# Patient Record
Sex: MALE | Race: ASIAN | Employment: OTHER | URBAN - METROPOLITAN AREA
[De-identification: names, ages, dates, MRNs, and addresses within clinical notes are randomized per-mention and may not be internally consistent; named-entity substitution may affect disease eponyms.]

---

## 2017-03-20 ENCOUNTER — OFFICE VISIT (OUTPATIENT)
Dept: INTERNAL MEDICINE CLINIC | Facility: CLINIC | Age: 62
End: 2017-03-20

## 2017-03-20 ENCOUNTER — APPOINTMENT (OUTPATIENT)
Dept: LAB | Age: 62
End: 2017-03-20
Attending: INTERNAL MEDICINE
Payer: MEDICAID

## 2017-03-20 VITALS
TEMPERATURE: 97 F | RESPIRATION RATE: 12 BRPM | BODY MASS INDEX: 28.49 KG/M2 | WEIGHT: 188 LBS | SYSTOLIC BLOOD PRESSURE: 126 MMHG | DIASTOLIC BLOOD PRESSURE: 80 MMHG | HEART RATE: 74 BPM | HEIGHT: 68 IN

## 2017-03-20 DIAGNOSIS — R73.03 PREDIABETES: ICD-10-CM

## 2017-03-20 DIAGNOSIS — I10 ESSENTIAL HYPERTENSION WITH GOAL BLOOD PRESSURE LESS THAN 140/90: ICD-10-CM

## 2017-03-20 DIAGNOSIS — Z87.39 HISTORY OF GOUT: ICD-10-CM

## 2017-03-20 LAB
ALBUMIN SERPL BCP-MCNC: 4.1 G/DL (ref 3.5–4.8)
ALBUMIN/GLOB SERPL: 1.5 {RATIO} (ref 1–2)
ALP SERPL-CCNC: 77 U/L (ref 32–100)
ALT SERPL-CCNC: 24 U/L (ref 17–63)
ANION GAP SERPL CALC-SCNC: 10 MMOL/L (ref 0–18)
AST SERPL-CCNC: 23 U/L (ref 15–41)
BILIRUB SERPL-MCNC: 0.6 MG/DL (ref 0.3–1.2)
BUN SERPL-MCNC: 10 MG/DL (ref 8–20)
BUN/CREAT SERPL: 8.4 (ref 10–20)
CALCIUM SERPL-MCNC: 9 MG/DL (ref 8.5–10.5)
CHLORIDE SERPL-SCNC: 102 MMOL/L (ref 95–110)
CO2 SERPL-SCNC: 28 MMOL/L (ref 22–32)
CREAT SERPL-MCNC: 1.19 MG/DL (ref 0.5–1.5)
GLOBULIN PLAS-MCNC: 2.7 G/DL (ref 2.5–3.7)
GLUCOSE SERPL-MCNC: 110 MG/DL (ref 70–99)
HBA1C MFR BLD: 6 % (ref 4–6)
OSMOLALITY UR CALC.SUM OF ELEC: 290 MOSM/KG (ref 275–295)
POTASSIUM SERPL-SCNC: 3.6 MMOL/L (ref 3.3–5.1)
PROT SERPL-MCNC: 6.8 G/DL (ref 5.9–8.4)
SODIUM SERPL-SCNC: 140 MMOL/L (ref 136–144)
URATE SERPL-MCNC: 8 MG/DL (ref 3.3–8.7)

## 2017-03-20 PROCEDURE — 80053 COMPREHEN METABOLIC PANEL: CPT

## 2017-03-20 PROCEDURE — 84550 ASSAY OF BLOOD/URIC ACID: CPT

## 2017-03-20 PROCEDURE — 99214 OFFICE O/P EST MOD 30 MIN: CPT | Performed by: INTERNAL MEDICINE

## 2017-03-20 PROCEDURE — 36415 COLL VENOUS BLD VENIPUNCTURE: CPT

## 2017-03-20 PROCEDURE — 99212 OFFICE O/P EST SF 10 MIN: CPT | Performed by: INTERNAL MEDICINE

## 2017-03-20 PROCEDURE — 83036 HEMOGLOBIN GLYCOSYLATED A1C: CPT

## 2017-03-20 RX ORDER — AMLODIPINE BESYLATE 5 MG/1
5 TABLET ORAL
Refills: 11 | COMMUNITY
Start: 2017-01-24 | End: 2017-03-20

## 2017-03-20 RX ORDER — AMLODIPINE BESYLATE 5 MG/1
5 TABLET ORAL
Qty: 90 TABLET | Refills: 1 | Status: SHIPPED | OUTPATIENT
Start: 2017-03-20 | End: 2017-10-20

## 2017-03-20 NOTE — PROGRESS NOTES
HPI:    Patient ID: Aline Nuñez is a 59year old male. Blood Sugar  This is a chronic (pt states he has prediabetes) problem. The current episode started more than 1 year ago. The problem occurs constantly. The problem has been unchanged.  Pertinent ne LOSARTAN POTASSIUM 25 MG Oral Tab TAKE 1 TABLET BY MOUTH EVERY DAY Disp: 30 tablet Rfl: 2   Zoster Vaccine Live (ZOSTAVAX) 26371 UNT/0.65ML Subcutaneous Recon Soln As directed for 1 dose.  Disp: 1 each Rfl: 0     Allergies:  Atarax [Hydroxyzine]    Fatigu [E]  Lipid Panel [E]  Microalb/Creat Ratio, Random Urine [E]  Uric Acid, Serum [E]  Hemoglobin A1C [E]    Meds This Visit:  No prescriptions requested or ordered in this encounter       Imaging & Referrals:  None       OB#[id number]

## 2017-08-28 ENCOUNTER — MYAURORA ACCOUNT LINK (OUTPATIENT)
Dept: OTHER | Age: 62
End: 2017-08-28

## 2017-09-05 ENCOUNTER — LAB ENCOUNTER (OUTPATIENT)
Dept: LAB | Age: 62
End: 2017-09-05
Attending: INTERNAL MEDICINE
Payer: MEDICAID

## 2017-09-05 ENCOUNTER — OFFICE VISIT (OUTPATIENT)
Dept: INTERNAL MEDICINE CLINIC | Facility: CLINIC | Age: 62
End: 2017-09-05

## 2017-09-05 VITALS
WEIGHT: 192 LBS | DIASTOLIC BLOOD PRESSURE: 86 MMHG | BODY MASS INDEX: 28.77 KG/M2 | TEMPERATURE: 98 F | RESPIRATION RATE: 12 BRPM | HEIGHT: 68.5 IN | HEART RATE: 64 BPM | SYSTOLIC BLOOD PRESSURE: 134 MMHG

## 2017-09-05 DIAGNOSIS — Z00.00 ANNUAL PHYSICAL EXAM: ICD-10-CM

## 2017-09-05 DIAGNOSIS — R35.1 NOCTURIA: ICD-10-CM

## 2017-09-05 DIAGNOSIS — Z87.39 HISTORY OF GOUT: ICD-10-CM

## 2017-09-05 DIAGNOSIS — Z12.11 COLON CANCER SCREENING: ICD-10-CM

## 2017-09-05 DIAGNOSIS — R06.83 SNORING: ICD-10-CM

## 2017-09-05 DIAGNOSIS — B35.1 ONYCHOMYCOSIS: ICD-10-CM

## 2017-09-05 DIAGNOSIS — Z00.00 ANNUAL PHYSICAL EXAM: Primary | ICD-10-CM

## 2017-09-05 DIAGNOSIS — I10 ESSENTIAL HYPERTENSION WITH GOAL BLOOD PRESSURE LESS THAN 140/90: ICD-10-CM

## 2017-09-05 DIAGNOSIS — R73.03 PREDIABETES: ICD-10-CM

## 2017-09-05 LAB
ALBUMIN SERPL BCP-MCNC: 4.3 G/DL (ref 3.5–4.8)
ALBUMIN/GLOB SERPL: 1.7 {RATIO} (ref 1–2)
ALP SERPL-CCNC: 69 U/L (ref 32–100)
ALT SERPL-CCNC: 27 U/L (ref 17–63)
ANION GAP SERPL CALC-SCNC: 10 MMOL/L (ref 0–18)
AST SERPL-CCNC: 25 U/L (ref 15–41)
BASOPHILS # BLD: 0 K/UL (ref 0–0.2)
BASOPHILS NFR BLD: 1 %
BILIRUB SERPL-MCNC: 0.5 MG/DL (ref 0.3–1.2)
BILIRUB UR QL: NEGATIVE
BUN SERPL-MCNC: 10 MG/DL (ref 8–20)
BUN/CREAT SERPL: 9.3 (ref 10–20)
CALCIUM SERPL-MCNC: 9.1 MG/DL (ref 8.5–10.5)
CHLORIDE SERPL-SCNC: 99 MMOL/L (ref 95–110)
CHOLEST SERPL-MCNC: 96 MG/DL (ref 110–200)
CLARITY UR: CLEAR
CO2 SERPL-SCNC: 27 MMOL/L (ref 22–32)
COLOR UR: YELLOW
CREAT SERPL-MCNC: 1.08 MG/DL (ref 0.5–1.5)
EOSINOPHIL # BLD: 0.5 K/UL (ref 0–0.7)
EOSINOPHIL NFR BLD: 8 %
ERYTHROCYTE [DISTWIDTH] IN BLOOD BY AUTOMATED COUNT: 13.8 % (ref 11–15)
GLOBULIN PLAS-MCNC: 2.6 G/DL (ref 2.5–3.7)
GLUCOSE SERPL-MCNC: 107 MG/DL (ref 70–99)
GLUCOSE UR-MCNC: NEGATIVE MG/DL
HCT VFR BLD AUTO: 45.5 % (ref 41–52)
HDLC SERPL-MCNC: 18 MG/DL
HGB BLD-MCNC: 15.6 G/DL (ref 13.5–17.5)
HGB UR QL STRIP.AUTO: NEGATIVE
KETONES UR-MCNC: NEGATIVE MG/DL
LDLC SERPL CALC-MCNC: 68 MG/DL (ref 0–99)
LEUKOCYTE ESTERASE UR QL STRIP.AUTO: NEGATIVE
LYMPHOCYTES # BLD: 1.7 K/UL (ref 1–4)
LYMPHOCYTES NFR BLD: 26 %
MCH RBC QN AUTO: 29.6 PG (ref 27–32)
MCHC RBC AUTO-ENTMCNC: 34.3 G/DL (ref 32–37)
MCV RBC AUTO: 86.2 FL (ref 80–100)
MONOCYTES # BLD: 0.5 K/UL (ref 0–1)
MONOCYTES NFR BLD: 7 %
NEUTROPHILS # BLD AUTO: 3.8 K/UL (ref 1.8–7.7)
NEUTROPHILS NFR BLD: 58 %
NITRITE UR QL STRIP.AUTO: NEGATIVE
NONHDLC SERPL-MCNC: 78 MG/DL
OSMOLALITY UR CALC.SUM OF ELEC: 282 MOSM/KG (ref 275–295)
PH UR: 6 [PH] (ref 5–8)
PLATELET # BLD AUTO: 215 K/UL (ref 140–400)
PMV BLD AUTO: 9.9 FL (ref 7.4–10.3)
POTASSIUM SERPL-SCNC: 3.9 MMOL/L (ref 3.3–5.1)
PROT SERPL-MCNC: 6.9 G/DL (ref 5.9–8.4)
PROT UR-MCNC: NEGATIVE MG/DL
PSA SERPL-MCNC: 4 NG/ML (ref 0–4)
RBC # BLD AUTO: 5.28 M/UL (ref 4.5–5.9)
SODIUM SERPL-SCNC: 136 MMOL/L (ref 136–144)
SP GR UR STRIP: 1.01 (ref 1–1.03)
TRIGL SERPL-MCNC: 49 MG/DL (ref 1–149)
URATE SERPL-MCNC: 8.3 MG/DL (ref 3.3–8.7)
UROBILINOGEN UR STRIP-ACNC: <2
VIT C UR-MCNC: NEGATIVE MG/DL
WBC # BLD AUTO: 6.6 K/UL (ref 4–11)

## 2017-09-05 PROCEDURE — 81003 URINALYSIS AUTO W/O SCOPE: CPT

## 2017-09-05 PROCEDURE — 36415 COLL VENOUS BLD VENIPUNCTURE: CPT

## 2017-09-05 PROCEDURE — 80061 LIPID PANEL: CPT

## 2017-09-05 PROCEDURE — 84550 ASSAY OF BLOOD/URIC ACID: CPT

## 2017-09-05 PROCEDURE — 80053 COMPREHEN METABOLIC PANEL: CPT

## 2017-09-05 PROCEDURE — 99396 PREV VISIT EST AGE 40-64: CPT | Performed by: INTERNAL MEDICINE

## 2017-09-05 PROCEDURE — 85025 COMPLETE CBC W/AUTO DIFF WBC: CPT

## 2017-09-05 NOTE — PROGRESS NOTES
HPI:    Patient ID: Quita Tabares is a 59year old male. Patient presents today for his annual physical.        Review of Systems   Constitutional: Negative. HENT: Negative. Eyes: Negative.     Respiratory: Negative for cough and shortness of breat thyromegaly present. Cardiovascular: Normal rate, regular rhythm, normal heart sounds and intact distal pulses. No murmur heard. Pulmonary/Chest: Effort normal and breath sounds normal. No respiratory distress. He has no wheezes. He has no rales.    A Referred to ENT.    (R35.1) Nocturia  Plan: URINALYSIS, ROUTINE, UROLOGY - INTERNAL        Has 2 to 3/night nocturia. His prostate was also enlarged. We will refer to urologist.     (B35.1) Onychomycosis  Plan: DERM - INTERNAL        Refer to derm.

## 2017-09-08 ENCOUNTER — OFFICE VISIT (OUTPATIENT)
Dept: OTOLARYNGOLOGY | Facility: CLINIC | Age: 62
End: 2017-09-08

## 2017-09-08 VITALS
WEIGHT: 192 LBS | DIASTOLIC BLOOD PRESSURE: 77 MMHG | HEIGHT: 68 IN | TEMPERATURE: 98 F | HEART RATE: 73 BPM | SYSTOLIC BLOOD PRESSURE: 130 MMHG | BODY MASS INDEX: 29.1 KG/M2

## 2017-09-08 DIAGNOSIS — G47.33 OBSTRUCTIVE SLEEP APNEA SYNDROME: Primary | ICD-10-CM

## 2017-09-08 PROCEDURE — 99212 OFFICE O/P EST SF 10 MIN: CPT | Performed by: OTOLARYNGOLOGY

## 2017-09-08 PROCEDURE — 99243 OFF/OP CNSLTJ NEW/EST LOW 30: CPT | Performed by: OTOLARYNGOLOGY

## 2017-09-08 NOTE — PROGRESS NOTES
Esvin Ortiz is a 59year old male. Patient presents with:  Snoring: patient presents for snoring consult    HPI:   He has been snoring loudly for many years.  He feels that he generally is well rested in thrning but his family tells him that he seems to s Normal Lips - Normal, Tonsils - Normal, Tongue - Normal   Narrow pharynx with very small tonsils   Nasal Normal External nose - Normal. Nasal septum - donell septal deviation bilaterally, Turbinates - Normal   Neurological Normal Memory - Normal. Cranial ner

## 2017-09-08 NOTE — PATIENT INSTRUCTIONS
Snoring and Sleep Apnea: Notes for a Partner  Snoring and sleep apnea affect your life, as well as your partner’s. You can help in the treatment of the problem. Be supportive.  Encourage your partner both to get treatment and to make the adjustments neede

## 2017-09-19 ENCOUNTER — OFFICE VISIT (OUTPATIENT)
Dept: SURGERY | Facility: CLINIC | Age: 62
End: 2017-09-19

## 2017-09-19 VITALS
HEART RATE: 77 BPM | TEMPERATURE: 98 F | HEIGHT: 68 IN | SYSTOLIC BLOOD PRESSURE: 153 MMHG | BODY MASS INDEX: 28.79 KG/M2 | DIASTOLIC BLOOD PRESSURE: 79 MMHG | RESPIRATION RATE: 16 BRPM | WEIGHT: 190 LBS

## 2017-09-19 DIAGNOSIS — R97.20 ELEVATED PSA: Primary | ICD-10-CM

## 2017-09-19 PROCEDURE — 99244 OFF/OP CNSLTJ NEW/EST MOD 40: CPT | Performed by: UROLOGY

## 2017-09-19 PROCEDURE — 99212 OFFICE O/P EST SF 10 MIN: CPT | Performed by: UROLOGY

## 2017-09-19 NOTE — PROGRESS NOTES
SUBJECTIVE:  Sweta Orellana is a 59year old male who presents for a consultation at the request of, and a copy of this note will be sent to, Dr. Kate Laguerre, for evaluation of  benign prostatic hyperplasia and elevated PSA.  He states that the problem is unc sweats, bone pain, malaise and fatigue. Positive for:  None.   All other ROS reviewed and otherwise normal.    OBJECTIVE:  /79 (BP Location: Right arm, Patient Position: Sitting, Cuff Size: adult)   Pulse 77   Temp 98.2 °F (36.8 °C) (Oral)   Resp 16

## 2017-10-17 ENCOUNTER — TELEPHONE (OUTPATIENT)
Dept: GASTROENTEROLOGY | Facility: CLINIC | Age: 62
End: 2017-10-17

## 2017-10-17 ENCOUNTER — OFFICE VISIT (OUTPATIENT)
Dept: GASTROENTEROLOGY | Facility: CLINIC | Age: 62
End: 2017-10-17

## 2017-10-17 VITALS
DIASTOLIC BLOOD PRESSURE: 80 MMHG | HEIGHT: 68 IN | WEIGHT: 191.63 LBS | HEART RATE: 66 BPM | BODY MASS INDEX: 29.04 KG/M2 | SYSTOLIC BLOOD PRESSURE: 138 MMHG

## 2017-10-17 DIAGNOSIS — Z12.11 COLON CANCER SCREENING: Primary | ICD-10-CM

## 2017-10-17 DIAGNOSIS — Z12.11 ENCOUNTER FOR SCREENING COLONOSCOPY: Primary | ICD-10-CM

## 2017-10-17 PROCEDURE — 99241 OFFICE CONSULTATION,LEVEL I: CPT | Performed by: INTERNAL MEDICINE

## 2017-10-17 PROCEDURE — 99212 OFFICE O/P EST SF 10 MIN: CPT | Performed by: INTERNAL MEDICINE

## 2017-10-17 NOTE — TELEPHONE ENCOUNTER
Current Outpatient Prescriptions:  PEG 3350-KCl-NaBcb-NaCl-NaSulf (COLYTE WITH FLAVOR PACKS) 227.1 g Oral Recon Soln Mix up and chill, then drink as directed.  Disp: 1 Bottle Rfl: 0     Per pharmacy med not available pls advise and change

## 2017-10-17 NOTE — PATIENT INSTRUCTIONS
Schedule colonoscopy exam at [facility] (Kaleb Leslie)    IV sedation (conscious sedation)    Golytely (PEG) 4L bowel prep      DX = Screening CLN

## 2017-10-17 NOTE — TELEPHONE ENCOUNTER
11/25/2017 8:45 AM KIM, PROCEDURE ECWMOGIPROC None       Spoke to the pharmacist at [facility].  The prep was changed to 336g from 227g which is not available

## 2017-10-17 NOTE — PROGRESS NOTES
HPI:    Patient ID: Venessa Fernandez is a 59year old man with history of hypertension referred to us by Dr. Apollo Riddle for his second colonoscopy examination. Body mass index is 29.13 kg/m².     On review of systems, he denies abdominal pain, rectal bleedi injury or perforation, infection. The patient understood these risks and agrees to proceed. The need for an accompanying adult to provide a ride home or escort home was also discussed.     PEG 4 L bowel prep    No orders of the defined types were placed in

## 2017-10-17 NOTE — TELEPHONE ENCOUNTER
Scheduled for:  Colonoscopy 04846  Provider Name: Dr. Lorrie Bazan  Date:  11/25/17  Location:  [facility]  Sedation:  IV  Time:  8539 (pt is aware to arrive at 0745)   Prep:  Golytely  Meds/Allergies Reconciled?:  Physician reviewed   Diagnosis with codes:  Colon canc

## 2017-10-20 ENCOUNTER — TELEPHONE (OUTPATIENT)
Dept: OTOLARYNGOLOGY | Facility: CLINIC | Age: 62
End: 2017-10-20

## 2017-10-20 DIAGNOSIS — G47.30 SLEEP APNEA, UNSPECIFIED TYPE: Primary | ICD-10-CM

## 2017-10-20 RX ORDER — AMLODIPINE BESYLATE 5 MG/1
TABLET ORAL
Qty: 90 TABLET | Refills: 0 | Status: SHIPPED | OUTPATIENT
Start: 2017-10-20 | End: 2017-12-06

## 2017-10-20 NOTE — TELEPHONE ENCOUNTER
Spoke with [facility] [phone] and prior auth pending , per insurance pt qualify for home sleep study. Clinical notes faxed to [phone]. confirmation  Received.  Case [id number]

## 2017-10-20 NOTE — TELEPHONE ENCOUNTER
Sleep center would like to know if prior auth has been obtained for sleep study. Please advise.  Thank you -transferred to RN

## 2017-10-20 NOTE — TELEPHONE ENCOUNTER
Medication refilled for 90 days per protocol.     Hypertensive Medications  Protocol Criteria:  · Appointment scheduled in the past 6 months or in the next 3 months  · BMP or CMP in the past 12 months  · Creatinine result < 2  Recent Outpatient Visits

## 2017-10-21 NOTE — TELEPHONE ENCOUNTER
[phone] [facility] sleep study was denied/     Please advise/

## 2017-10-21 NOTE — TELEPHONE ENCOUNTER
, please see note below, sleep study denied . Pt would qualify for home sleep study would need prior authorization.

## 2017-10-23 ENCOUNTER — OFFICE VISIT (OUTPATIENT)
Dept: DERMATOLOGY CLINIC | Facility: CLINIC | Age: 62
End: 2017-10-23

## 2017-10-23 DIAGNOSIS — L56.8 PHOTODERMATITIS: ICD-10-CM

## 2017-10-23 DIAGNOSIS — L28.0 LICHEN SIMPLEX CHRONICUS: ICD-10-CM

## 2017-10-23 DIAGNOSIS — L30.9 DERMATITIS: Primary | ICD-10-CM

## 2017-10-23 PROCEDURE — 99212 OFFICE O/P EST SF 10 MIN: CPT | Performed by: DERMATOLOGY

## 2017-10-23 PROCEDURE — 99213 OFFICE O/P EST LOW 20 MIN: CPT | Performed by: DERMATOLOGY

## 2017-10-23 RX ORDER — KETOCONAZOLE 20 MG/G
CREAM TOPICAL
Qty: 60 G | Refills: 3 | Status: ON HOLD | OUTPATIENT
Start: 2017-10-23 | End: 2019-06-15

## 2017-10-23 RX ORDER — TRIAMCINOLONE ACETONIDE 5 MG/G
CREAM TOPICAL
Qty: 60 G | Refills: 1 | Status: ON HOLD | OUTPATIENT
Start: 2017-10-23 | End: 2019-06-15

## 2017-10-23 RX ORDER — CLOBETASOL PROPIONATE 0.5 MG/G
1 CREAM TOPICAL 2 TIMES DAILY
Qty: 60 G | Refills: 3 | Status: SHIPPED | OUTPATIENT
Start: 2017-10-23 | End: 2018-10-23

## 2017-10-23 RX ORDER — POLYETHYLENE GLYCOL 3350, SODIUM CHLORIDE, POTASSIUM CHLORIDE, SODIUM BICARBONATE, AND SODIUM SULFATE 240; 5.84; 2.98; 6.72; 22.72 G/4L; G/4L; G/4L; G/4L; G/4L
POWDER, FOR SOLUTION ORAL
Refills: 0 | Status: ON HOLD | COMMUNITY
Start: 2017-10-18 | End: 2019-06-15

## 2017-10-25 NOTE — TELEPHONE ENCOUNTER
Order generated Home Sleep Study and prior authorization approved [id number] and expires 01/23/17. Pt informed and will contact [phone] Zachariah Funez to schedule.

## 2017-10-30 NOTE — PROGRESS NOTES
Marvin Hayes is a 59year old male. Patient presents with:  Full Skin Exam: \"Established pt\"- LOV- 6-22-16. Pt presents today for full body eval. Per pt no new lesions of concern but concerned with itching to eyebrow x 2 years.  Pt with hx of photod Current Outpatient Prescriptions:  Clobetasol Propionate 0.05 % External Cream Apply 1 Application topically 2 (two) times daily.  Disp: 60 g Rfl: 3   triamcinolone acetonide 0.5 % External Cream Use bid as directed Disp: 60 g Rfl: 1 History Narrative    The patient does not use an assistive device. .      The patient does live in a home with stairs.      Family History   Problem Relation Age of Onset   • Diabetes Mother    • Hypertension Mother    • Diabetes Father    • RA [OTHER] Broth upper lateral arm specifically area. Erythema and scaling on the eyebrows r scalp with diffuse erythema and scaling. .  Prominent eczematous patchy areas inflammatory changes over the arms and hands as well overall improved versus previous exam lichenified Refills for this Visit:   Signed Prescriptions Disp Refills    Clobetasol Propionate 0.05 % External Cream 60 g 3      Sig: Apply 1 Application topically 2 (two) times daily.       triamcinolone acetonide 0.5 % External Cream 60 g 1      Sig: Use bid as dir

## 2017-11-06 ENCOUNTER — TELEPHONE (OUTPATIENT)
Dept: INTERNAL MEDICINE CLINIC | Facility: CLINIC | Age: 62
End: 2017-11-06

## 2017-11-06 NOTE — TELEPHONE ENCOUNTER
Pt calling requesting refill, pt states he is out of meds.        Current Outpatient Prescriptions:  AMLODIPINE BESYLATE 5 MG Oral Tab TAKE ONE TABLET BY MOUTH ONCE DAILY Disp: 90 tablet Rfl: 0`

## 2017-11-06 NOTE — TELEPHONE ENCOUNTER
Spoke with [facility] and verified refill for Amlodipine 10/20/17 and will be ready for . Per [facility], patient's insurance only pays for 30 days at a time.  Spoke with patient and relayed refill for #30  + 2 is available at [facility]-

## 2017-11-25 ENCOUNTER — SURGERY (OUTPATIENT)
Age: 62
End: 2017-11-25

## 2017-11-25 ENCOUNTER — HOSPITAL ENCOUNTER (OUTPATIENT)
Facility: HOSPITAL | Age: 62
Setting detail: HOSPITAL OUTPATIENT SURGERY
Discharge: HOME | End: 2017-11-25
Attending: INTERNAL MEDICINE | Admitting: INTERNAL MEDICINE
Payer: MEDICAID

## 2017-11-25 DIAGNOSIS — Z12.11 COLON CANCER SCREENING: ICD-10-CM

## 2017-11-25 PROCEDURE — 45385 COLONOSCOPY W/LESION REMOVAL: CPT | Performed by: INTERNAL MEDICINE

## 2017-11-25 PROCEDURE — 0DBP8ZX EXCISION OF RECTUM, VIA NATURAL OR ARTIFICIAL OPENING ENDOSCOPIC, DIAGNOSTIC: ICD-10-PCS | Performed by: INTERNAL MEDICINE

## 2017-11-25 PROCEDURE — 99153 MOD SED SAME PHYS/QHP EA: CPT | Performed by: INTERNAL MEDICINE

## 2017-11-25 PROCEDURE — 99152 MOD SED SAME PHYS/QHP 5/>YRS: CPT | Performed by: INTERNAL MEDICINE

## 2017-11-25 RX ORDER — MIDAZOLAM HYDROCHLORIDE 1 MG/ML
1 INJECTION INTRAMUSCULAR; INTRAVENOUS EVERY 5 MIN PRN
Status: DISCONTINUED | OUTPATIENT
Start: 2017-11-25 | End: 2017-11-25

## 2017-11-25 RX ORDER — MIDAZOLAM HYDROCHLORIDE 1 MG/ML
INJECTION INTRAMUSCULAR; INTRAVENOUS
Status: DISCONTINUED | OUTPATIENT
Start: 2017-11-25 | End: 2017-11-25

## 2017-11-25 RX ORDER — SODIUM CHLORIDE 0.9 % (FLUSH) 0.9 %
10 SYRINGE (ML) INJECTION AS NEEDED
Status: DISCONTINUED | OUTPATIENT
Start: 2017-11-25 | End: 2017-11-25

## 2017-11-25 RX ORDER — SODIUM CHLORIDE, SODIUM LACTATE, POTASSIUM CHLORIDE, CALCIUM CHLORIDE 600; 310; 30; 20 MG/100ML; MG/100ML; MG/100ML; MG/100ML
INJECTION, SOLUTION INTRAVENOUS CONTINUOUS
Status: DISCONTINUED | OUTPATIENT
Start: 2017-11-25 | End: 2017-11-25

## 2017-11-25 NOTE — PRE-SEDATION ASSESSMENT
Physician Pre-Sedation Assessment    Pre-Sedation Assessment:    Sedation History: Airway Assessed    Cardiac: normal S1, S2  Respiratory: breath sounds clear bilaterally   Abdomen: soft, BS (+), non-tender    ASA Classification: ASA II    Plan: IV Sedatio

## 2017-11-25 NOTE — H&P
History & Physical Examination    Patient Name: Antoni Khoury  MRN: [id number]  CSN: [id number]  YOB: 1955    Diagnosis: Screening colonoscopy examination    Present Illness:     59year old man with history of hypertension referred to us by mg Intravenous Q5 Min PRN   fentaNYL citrate (SUBLIMAZE) 0.05 MG/ML injection 25 mcg 25 mcg Intravenous Q5 Min PRN       Allergies:   Atarax [Hydroxyzine]    Fatigue    Past Medical History:   Diagnosis Date   • Essential hypertension 5/27/2015   • Gout

## 2017-11-25 NOTE — OPERATIVE REPORT
COLONOSCOPY PROCEDURE REPORT     DATE OF PROCEDURE:  11/25/2017     PCP: Georgette Guerra MD     PREOPERATIVE DIAGNOSIS: Screening colonoscopy examination     POSTOPERATIVE DIAGNOSIS:  See impression. SURGEON:  Romeo Cardona M.D.     Shiela Kidd

## 2017-11-27 VITALS
OXYGEN SATURATION: 95 % | RESPIRATION RATE: 18 BRPM | HEART RATE: 60 BPM | HEIGHT: 68 IN | DIASTOLIC BLOOD PRESSURE: 80 MMHG | SYSTOLIC BLOOD PRESSURE: 134 MMHG | WEIGHT: 185 LBS | BODY MASS INDEX: 28.04 KG/M2

## 2017-12-04 ENCOUNTER — TELEPHONE (OUTPATIENT)
Dept: GASTROENTEROLOGY | Facility: CLINIC | Age: 62
End: 2017-12-04

## 2017-12-04 NOTE — TELEPHONE ENCOUNTER
Message   Received: 2 days ago   [address] [facility], Roseann Gary MD  [facility] Clinical Staff             GI RNs - 1.  Please print and mail this letter to patient; 2. Recall for colonoscopy exam in 5 years      Results letter mailed to patient a

## 2017-12-06 ENCOUNTER — PRIOR ORIGINAL RECORDS (OUTPATIENT)
Dept: OTHER | Age: 62
End: 2017-12-06

## 2017-12-06 ENCOUNTER — APPOINTMENT (OUTPATIENT)
Dept: LAB | Age: 62
End: 2017-12-06
Attending: INTERNAL MEDICINE
Payer: MEDICAID

## 2017-12-06 ENCOUNTER — OFFICE VISIT (OUTPATIENT)
Dept: INTERNAL MEDICINE CLINIC | Facility: CLINIC | Age: 62
End: 2017-12-06

## 2017-12-06 VITALS
TEMPERATURE: 97 F | DIASTOLIC BLOOD PRESSURE: 78 MMHG | HEART RATE: 93 BPM | BODY MASS INDEX: 29.98 KG/M2 | SYSTOLIC BLOOD PRESSURE: 133 MMHG | WEIGHT: 191 LBS | RESPIRATION RATE: 12 BRPM | HEIGHT: 67 IN

## 2017-12-06 DIAGNOSIS — R00.2 PALPITATION: ICD-10-CM

## 2017-12-06 DIAGNOSIS — Z23 NEED FOR VACCINATION: ICD-10-CM

## 2017-12-06 DIAGNOSIS — H93.12 TINNITUS, LEFT: Primary | ICD-10-CM

## 2017-12-06 PROCEDURE — 84443 ASSAY THYROID STIM HORMONE: CPT

## 2017-12-06 PROCEDURE — 90471 IMMUNIZATION ADMIN: CPT | Performed by: INTERNAL MEDICINE

## 2017-12-06 PROCEDURE — 90686 IIV4 VACC NO PRSV 0.5 ML IM: CPT | Performed by: INTERNAL MEDICINE

## 2017-12-06 PROCEDURE — 36415 COLL VENOUS BLD VENIPUNCTURE: CPT

## 2017-12-06 PROCEDURE — 93000 ELECTROCARDIOGRAM COMPLETE: CPT | Performed by: INTERNAL MEDICINE

## 2017-12-06 PROCEDURE — 80048 BASIC METABOLIC PNL TOTAL CA: CPT

## 2017-12-06 PROCEDURE — 99214 OFFICE O/P EST MOD 30 MIN: CPT | Performed by: INTERNAL MEDICINE

## 2017-12-06 PROCEDURE — 83735 ASSAY OF MAGNESIUM: CPT

## 2017-12-06 RX ORDER — AMLODIPINE BESYLATE 5 MG/1
TABLET ORAL
Qty: 90 TABLET | Refills: 0 | Status: SHIPPED | OUTPATIENT
Start: 2017-12-06 | End: 2018-03-07

## 2017-12-06 NOTE — PROGRESS NOTES
HPI:    Patient ID: Kacey Boyd is a 59year old male. Tinnitus   This is a chronic problem. The current episode started more than 1 year ago (5 yrs). The problem occurs constantly. The problem has been unchanged.  Pertinent negatives include no chest Live (ZOSTAVAX) 94509 UNT/0.65ML Subcutaneous Recon Soln As directed for 1 dose. Disp: 1 each Rfl: 0     Allergies:  Atarax [Hydroxyzine]    Fatigue   PHYSICAL EXAM:   Physical Exam   Constitutional: He appears well-developed. Non-toxic appearance.  He cruz ffup with Dr Micky Longo his cardiologist. We will do bmp, Mg and TSH. We will also do 2decho and holter test. Pt told to go to ER if pt gets more episodes or more symptoms. Pt agreed.     (Z23) Need for vaccination  Plan: FLULAVAL INFLUENZA VACCINE QUAD PRESERVA

## 2017-12-08 ENCOUNTER — OFFICE VISIT (OUTPATIENT)
Dept: AUDIOLOGY | Facility: CLINIC | Age: 62
End: 2017-12-08

## 2017-12-08 ENCOUNTER — OFFICE VISIT (OUTPATIENT)
Dept: OTOLARYNGOLOGY | Facility: CLINIC | Age: 62
End: 2017-12-08

## 2017-12-08 VITALS
SYSTOLIC BLOOD PRESSURE: 138 MMHG | DIASTOLIC BLOOD PRESSURE: 70 MMHG | BODY MASS INDEX: 29.98 KG/M2 | TEMPERATURE: 98 F | WEIGHT: 191 LBS | HEIGHT: 67 IN

## 2017-12-08 DIAGNOSIS — H93.13 RINGING OF EARS, BILATERAL: Primary | ICD-10-CM

## 2017-12-08 DIAGNOSIS — IMO0001 ASYMMETRICAL HEARING LOSS OF LEFT EAR: Primary | ICD-10-CM

## 2017-12-08 DIAGNOSIS — H93.12 TINNITUS OF LEFT EAR: ICD-10-CM

## 2017-12-08 PROCEDURE — 92557 COMPREHENSIVE HEARING TEST: CPT | Performed by: AUDIOLOGIST

## 2017-12-08 PROCEDURE — 99212 OFFICE O/P EST SF 10 MIN: CPT | Performed by: OTOLARYNGOLOGY

## 2017-12-08 PROCEDURE — 99213 OFFICE O/P EST LOW 20 MIN: CPT | Performed by: OTOLARYNGOLOGY

## 2017-12-08 PROCEDURE — 92550 TYMPANOMETRY & REFLEX THRESH: CPT | Performed by: AUDIOLOGIST

## 2017-12-08 NOTE — PROGRESS NOTES
Epifanio Gustafson is a 59year old male. Patient presents with:  Ringing In Ear: patient presents for ringing of left ear for years    HPI:   Several years ago he lost hearing in his left ear.  He was seen by another physician and had an MRI performed which was otherwise  GENERAL : denies fever, chills, sweats, weight loss, weight gain  SKIN: denies any unusual skin lesions or rashes  RESPIRATORY: denies shortness of breath with exertion  NEURO: denies headaches    EXAM:   /70 (BP Location: Left arm, Patien and agrees to the plan. Jose Alfredo Wright MD  12/8/2017  11:00 AM

## 2017-12-08 NOTE — PROGRESS NOTES
AUDIOGRAM     Wolf Viveros was referred for testing by Sharona Quevedo V due to left-sided tinnitus and hearing loss. 12/24/1955  [id number]    Otoscopic Inspection:  Right ear:  No cerumen  Left ear:  No cerumen    Audiometric Test Results:   Audiometric

## 2017-12-14 LAB
BUN: 12 MG/DL
CALCIUM: 9.4 MG/DL
CHLORIDE: 103 MEQ/L
CREATININE, SERUM: 1.14 MG/DL
GLUCOSE: 130 MG/DL
GLUCOSE: 130 MG/DL
MAGNESIUM: 2.1 MG/DL
POTASSIUM, SERUM: 3.7 MEQ/L
SODIUM: 139 MEQ/L
THYROID STIMULATING HORMONE: 1.37 MLU/L

## 2017-12-15 ENCOUNTER — PRIOR ORIGINAL RECORDS (OUTPATIENT)
Dept: OTHER | Age: 62
End: 2017-12-15

## 2017-12-16 ENCOUNTER — APPOINTMENT (OUTPATIENT)
Dept: LAB | Age: 62
End: 2017-12-16
Attending: UROLOGY
Payer: MEDICAID

## 2017-12-16 DIAGNOSIS — R97.20 ELEVATED PSA: ICD-10-CM

## 2017-12-16 PROCEDURE — 36415 COLL VENOUS BLD VENIPUNCTURE: CPT

## 2017-12-16 PROCEDURE — 84153 ASSAY OF PSA TOTAL: CPT

## 2017-12-19 ENCOUNTER — OFFICE VISIT (OUTPATIENT)
Dept: SURGERY | Facility: CLINIC | Age: 62
End: 2017-12-19

## 2017-12-19 VITALS
TEMPERATURE: 98 F | BODY MASS INDEX: 28.79 KG/M2 | DIASTOLIC BLOOD PRESSURE: 88 MMHG | SYSTOLIC BLOOD PRESSURE: 136 MMHG | HEART RATE: 66 BPM | WEIGHT: 190 LBS | HEIGHT: 68 IN

## 2017-12-19 DIAGNOSIS — R97.20 ELEVATED PSA: Primary | ICD-10-CM

## 2017-12-19 PROCEDURE — 99213 OFFICE O/P EST LOW 20 MIN: CPT | Performed by: UROLOGY

## 2017-12-19 PROCEDURE — 99212 OFFICE O/P EST SF 10 MIN: CPT | Performed by: UROLOGY

## 2017-12-19 NOTE — PROGRESS NOTES
Jefry Banda is a 59year old male. HPI:   Patient presents with:  BPH: Patient still c/o nocturia 2x per night and frequency has improved to every 3-4 hours to urinate. Denies constipation, dysuria and gross hematuria.      66-year-old male presents in Application topically 2 (two) times daily. Disp: 60 g Rfl: 3   triamcinolone acetonide 0.5 % External Cream Use bid as directed Disp: 60 g Rfl: 1   ketoconazole 2 % External Cream Apply to affected area 2 times daily.  Disp: 60 g Rfl: 3   PEG 3350-KCl-NaBcb

## 2018-03-06 NOTE — TELEPHONE ENCOUNTER
Refill Protocol Appointment Criteria  · Appointment scheduled in the past 12 months or in the next 3 months  Recent Outpatient Visits            2 months ago Elevated PSA    [facility] for [facility] [place]    Offic

## 2018-03-06 NOTE — TELEPHONE ENCOUNTER
Current Outpatient Prescriptions:   •  Glucose Blood (TRUE METRIX BLOOD GLUCOSE TEST) In Vitro Strip, Check blood sugar once daily, Disp: 100 strip, Rfl: 3

## 2018-03-07 RX ORDER — AMLODIPINE BESYLATE 5 MG/1
TABLET ORAL
Qty: 90 TABLET | Refills: 0 | Status: SHIPPED | OUTPATIENT
Start: 2018-03-07 | End: 2018-06-11

## 2018-03-07 NOTE — TELEPHONE ENCOUNTER
Medication refilled for 90 days per protocol.     AmLODIPine Besylate 5 MG Oral Tab  TAKE ONE TABLET BY MOUTH ONCE DAILY       Disp: 90 tablet Refills: 0    Class: Normal Start: 3/7/2018   Documented:11 months ago  Hypertensive Medications Protocol Passed3/

## 2018-05-30 ENCOUNTER — MYAURORA ACCOUNT LINK (OUTPATIENT)
Dept: OTHER | Age: 63
End: 2018-05-30

## 2018-06-11 RX ORDER — AMLODIPINE BESYLATE 5 MG/1
TABLET ORAL
Qty: 30 TABLET | Refills: 0 | Status: SHIPPED | OUTPATIENT
Start: 2018-06-11 | End: 2018-10-24

## 2018-08-27 ENCOUNTER — PRIOR ORIGINAL RECORDS (OUTPATIENT)
Dept: OTHER | Age: 63
End: 2018-08-27

## 2018-10-24 ENCOUNTER — OFFICE VISIT (OUTPATIENT)
Dept: INTERNAL MEDICINE CLINIC | Facility: CLINIC | Age: 63
End: 2018-10-24
Payer: MEDICAID

## 2018-10-24 ENCOUNTER — PRIOR ORIGINAL RECORDS (OUTPATIENT)
Dept: OTHER | Age: 63
End: 2018-10-24

## 2018-10-24 ENCOUNTER — LAB ENCOUNTER (OUTPATIENT)
Dept: LAB | Age: 63
End: 2018-10-24
Attending: INTERNAL MEDICINE
Payer: COMMERCIAL

## 2018-10-24 VITALS
WEIGHT: 193 LBS | RESPIRATION RATE: 12 BRPM | HEART RATE: 67 BPM | DIASTOLIC BLOOD PRESSURE: 84 MMHG | BODY MASS INDEX: 29.25 KG/M2 | HEIGHT: 68 IN | TEMPERATURE: 98 F | SYSTOLIC BLOOD PRESSURE: 138 MMHG

## 2018-10-24 DIAGNOSIS — I10 ESSENTIAL HYPERTENSION WITH GOAL BLOOD PRESSURE LESS THAN 140/90: ICD-10-CM

## 2018-10-24 DIAGNOSIS — Z95.0 HISTORY OF PERMANENT CARDIAC PACEMAKER PLACEMENT: ICD-10-CM

## 2018-10-24 DIAGNOSIS — Z00.00 ANNUAL PHYSICAL EXAM: Primary | ICD-10-CM

## 2018-10-24 DIAGNOSIS — Z87.39 HISTORY OF GOUT: ICD-10-CM

## 2018-10-24 DIAGNOSIS — R73.03 PREDIABETES: ICD-10-CM

## 2018-10-24 DIAGNOSIS — Z00.00 ANNUAL PHYSICAL EXAM: ICD-10-CM

## 2018-10-24 DIAGNOSIS — H25.9 AGE-RELATED CATARACT OF BOTH EYES, UNSPECIFIED AGE-RELATED CATARACT TYPE: ICD-10-CM

## 2018-10-24 PROCEDURE — 90471 IMMUNIZATION ADMIN: CPT | Performed by: INTERNAL MEDICINE

## 2018-10-24 PROCEDURE — 36415 COLL VENOUS BLD VENIPUNCTURE: CPT

## 2018-10-24 PROCEDURE — 80053 COMPREHEN METABOLIC PANEL: CPT

## 2018-10-24 PROCEDURE — 83036 HEMOGLOBIN GLYCOSYLATED A1C: CPT

## 2018-10-24 PROCEDURE — 99396 PREV VISIT EST AGE 40-64: CPT | Performed by: INTERNAL MEDICINE

## 2018-10-24 PROCEDURE — 80061 LIPID PANEL: CPT

## 2018-10-24 PROCEDURE — 84550 ASSAY OF BLOOD/URIC ACID: CPT

## 2018-10-24 PROCEDURE — 85025 COMPLETE CBC W/AUTO DIFF WBC: CPT

## 2018-10-24 PROCEDURE — 90686 IIV4 VACC NO PRSV 0.5 ML IM: CPT | Performed by: INTERNAL MEDICINE

## 2018-10-24 PROCEDURE — 81003 URINALYSIS AUTO W/O SCOPE: CPT

## 2018-10-24 RX ORDER — AMLODIPINE BESYLATE 5 MG/1
TABLET ORAL
Qty: 90 TABLET | Refills: 1 | Status: SHIPPED | OUTPATIENT
Start: 2018-10-24 | End: 2019-06-10

## 2018-10-24 NOTE — PROGRESS NOTES
HPI:    Patient ID: Tunde Chacko is a 57year old male. Patient presents today for annual physical.       Hypertension   This is a chronic problem. The current episode started more than 1 year ago. The problem is controlled.  Pertinent negatives include Zoster Vaccine Live (ZOSTAVAX) 72541 UNT/0.65ML Subcutaneous Recon Soln As directed for 1 dose.  Disp: 1 each Rfl: 0     Allergies:  Atarax [Hydroxyzine]    FATIGUE   PHYSICAL EXAM:   Physical Exam   Constitutional: He appears well-developed and well-nour PANEL, PSA SCREEN,         URINALYSIS, ROUTINE        Routine labs ordered. Pt given flu shot today. Pt already had his colonoscopy last year.  Check psa.     (I10) Essential hypertension with goal blood pressure less than 140/90  Plan: bp fair but has not

## 2018-10-27 ENCOUNTER — TELEPHONE (OUTPATIENT)
Dept: INTERNAL MEDICINE CLINIC | Facility: CLINIC | Age: 63
End: 2018-10-27

## 2018-10-27 DIAGNOSIS — R97.20 PSA ELEVATION: Primary | ICD-10-CM

## 2018-12-10 ENCOUNTER — MYAURORA ACCOUNT LINK (OUTPATIENT)
Dept: OTHER | Age: 63
End: 2018-12-10

## 2018-12-20 LAB
ALT (SGPT): 23 U/L
AST (SGOT): 24 U/L
BILIRUBIN TOTAL: 0.7 MG/DL
BUN: 9 MG/DL
CALCIUM: 8.9 MG/DL
CHLORIDE: 103 MEQ/L
CHOLESTEROL, TOTAL: 99 MG/DL
CREATININE, SERUM: 0.97 MG/DL
GLUCOSE: 108 MG/DL
GLUCOSE: 108 MG/DL
HDL CHOLESTEROL: 18 MG/DL
HEMATOCRIT: 44.9 %
HEMOGLOBIN: 15.3 G/DL
LDL CHOLESTEROL: 71 MG/DL
NON-HDL CHOLESTEROL: 81 MG/DL
PLATELETS: 217 K/UL
POTASSIUM, SERUM: 3.8 MEQ/L
PROTEIN, TOTAL: 6.2 G/DL
RED BLOOD COUNT: 5.25 X 10-6/U
SGOT (AST): 24 IU/L
SGPT (ALT): 23 IU/L
SODIUM: 137 MEQ/L
TRIGLYCERIDES: 51 MG/DL
WHITE BLOOD COUNT: 6.5 X 10-3/U

## 2018-12-21 ENCOUNTER — PRIOR ORIGINAL RECORDS (OUTPATIENT)
Dept: OTHER | Age: 63
End: 2018-12-21

## 2018-12-21 ENCOUNTER — MYAURORA ACCOUNT LINK (OUTPATIENT)
Dept: OTHER | Age: 63
End: 2018-12-21

## 2019-01-01 ENCOUNTER — EXTERNAL RECORD (OUTPATIENT)
Dept: HEALTH INFORMATION MANAGEMENT | Facility: OTHER | Age: 64
End: 2019-01-01

## 2019-02-01 ENCOUNTER — APPOINTMENT (OUTPATIENT)
Dept: LAB | Facility: HOSPITAL | Age: 64
End: 2019-02-01
Attending: NURSE PRACTITIONER
Payer: COMMERCIAL

## 2019-02-01 ENCOUNTER — OFFICE VISIT (OUTPATIENT)
Dept: SURGERY | Facility: CLINIC | Age: 64
End: 2019-02-01
Payer: MEDICAID

## 2019-02-01 VITALS
DIASTOLIC BLOOD PRESSURE: 90 MMHG | SYSTOLIC BLOOD PRESSURE: 143 MMHG | HEART RATE: 68 BPM | WEIGHT: 193 LBS | HEIGHT: 67 IN | BODY MASS INDEX: 30.29 KG/M2

## 2019-02-01 DIAGNOSIS — R97.20 ELEVATED PSA: Primary | ICD-10-CM

## 2019-02-01 DIAGNOSIS — R97.20 ELEVATED PSA: ICD-10-CM

## 2019-02-01 DIAGNOSIS — R35.1 BPH ASSOCIATED WITH NOCTURIA: ICD-10-CM

## 2019-02-01 DIAGNOSIS — Z80.42 FAMILY HISTORY OF PROSTATE CANCER: ICD-10-CM

## 2019-02-01 DIAGNOSIS — N40.1 BPH ASSOCIATED WITH NOCTURIA: ICD-10-CM

## 2019-02-01 LAB
PSA SERPL-MCNC: 2.9 NG/ML (ref 0.01–4)
PSA SERPL-MCNC: 4.2 NG/ML (ref 0–4)

## 2019-02-01 PROCEDURE — 99212 OFFICE O/P EST SF 10 MIN: CPT | Performed by: NURSE PRACTITIONER

## 2019-02-01 PROCEDURE — 84153 ASSAY OF PSA TOTAL: CPT

## 2019-02-01 PROCEDURE — 99214 OFFICE O/P EST MOD 30 MIN: CPT | Performed by: NURSE PRACTITIONER

## 2019-02-01 PROCEDURE — 36415 COLL VENOUS BLD VENIPUNCTURE: CPT

## 2019-02-01 NOTE — PROGRESS NOTES
HPI:    Patient ID: Sweta Orellana is a 70year old male. Patient is a 70year old male who presents to the clinic for an evaluation regarding elevated PSA. Patient of Dr. Cheryl Pace     last seen in December of 2017.   At the time patient was seen for PSA t Disp: 60 g Rfl: 1   ketoconazole 2 % External Cream Apply to affected area 2 times daily. Disp: 60 g Rfl: 3   PEG 3350-KCl-NaBcb-NaCl-NaSulf (COLYTE WITH FLAVOR PACKS) 227.1 g Oral Recon Soln Mix up and chill, then drink as directed.  Disp: 1 Bottle Rfl: 0 symmetrical with no palpable nodules, non tender. Musculoskeletal: Normal range of motion. Neurological: He is alert and oriented to person, place, and time. Skin: Skin is warm and dry. Psychiatric: He has a normal mood and affect.             ASSES

## 2019-02-04 ENCOUNTER — TELEPHONE (OUTPATIENT)
Dept: SURGERY | Facility: CLINIC | Age: 64
End: 2019-02-04

## 2019-02-04 DIAGNOSIS — R97.20 ELEVATED PSA: Primary | ICD-10-CM

## 2019-02-04 NOTE — TELEPHONE ENCOUNTER
Patient PSA remains elevated at 4.2. He has a positive family history of prostate cancer in his brother. I called patient and discussed his results with him and the role of PSA monitoring.   I explained that PSA levels do not diagnose cancer, however in

## 2019-02-28 VITALS
DIASTOLIC BLOOD PRESSURE: 80 MMHG | SYSTOLIC BLOOD PRESSURE: 136 MMHG | WEIGHT: 194 LBS | HEART RATE: 68 BPM | RESPIRATION RATE: 16 BRPM

## 2019-02-28 VITALS
HEART RATE: 67 BPM | OXYGEN SATURATION: 96 % | SYSTOLIC BLOOD PRESSURE: 146 MMHG | HEIGHT: 68 IN | DIASTOLIC BLOOD PRESSURE: 84 MMHG | WEIGHT: 187 LBS | BODY MASS INDEX: 28.34 KG/M2

## 2019-02-28 VITALS
DIASTOLIC BLOOD PRESSURE: 80 MMHG | OXYGEN SATURATION: 96 % | SYSTOLIC BLOOD PRESSURE: 122 MMHG | HEIGHT: 68 IN | RESPIRATION RATE: 20 BRPM | HEART RATE: 76 BPM | BODY MASS INDEX: 28.64 KG/M2 | WEIGHT: 189 LBS

## 2019-03-25 PROCEDURE — 93294 REM INTERROG EVL PM/LDLS PM: CPT | Performed by: INTERNAL MEDICINE

## 2019-03-25 PROCEDURE — 93296 REM INTERROG EVL PM/IDS: CPT | Performed by: INTERNAL MEDICINE

## 2019-03-26 ENCOUNTER — OFFICE VISIT (OUTPATIENT)
Dept: INTERNAL MEDICINE CLINIC | Facility: CLINIC | Age: 64
End: 2019-03-26
Payer: MEDICAID

## 2019-03-26 VITALS
HEIGHT: 67 IN | BODY MASS INDEX: 29.51 KG/M2 | DIASTOLIC BLOOD PRESSURE: 76 MMHG | WEIGHT: 188 LBS | TEMPERATURE: 98 F | SYSTOLIC BLOOD PRESSURE: 126 MMHG | RESPIRATION RATE: 16 BRPM | HEART RATE: 71 BPM

## 2019-03-26 DIAGNOSIS — I10 ESSENTIAL HYPERTENSION WITH GOAL BLOOD PRESSURE LESS THAN 140/90: ICD-10-CM

## 2019-03-26 DIAGNOSIS — M77.51 CALCANEAL BURSITIS (HEEL), RIGHT: Primary | ICD-10-CM

## 2019-03-26 DIAGNOSIS — R73.03 PREDIABETES: ICD-10-CM

## 2019-03-26 PROCEDURE — 99212 OFFICE O/P EST SF 10 MIN: CPT | Performed by: INTERNAL MEDICINE

## 2019-03-26 PROCEDURE — 99214 OFFICE O/P EST MOD 30 MIN: CPT | Performed by: INTERNAL MEDICINE

## 2019-03-26 RX ORDER — INDOMETHACIN 25 MG/1
25 CAPSULE ORAL
Qty: 30 CAPSULE | Refills: 0 | Status: SHIPPED | OUTPATIENT
Start: 2019-03-26 | End: 2019-05-17

## 2019-03-26 NOTE — PROGRESS NOTES
HPI:    Patient ID: Aline Nuñez is a 70year old male. Heel Pain   This is a new problem. The current episode started in the past 7 days. The problem occurs constantly. The problem has been gradually improving.  Associated symptoms include joint swelli appearance. He does not appear ill. No distress. HENT:   Head: Normocephalic and atraumatic.    Right Ear: External ear normal.   Left Ear: External ear normal.   Nose: Nose normal.   Mouth/Throat: Oropharynx is clear and moist.   Eyes: Conjunctivae and E pressure less than 140/90  Plan: We will check his chemistries. Continue his blood pressure medication. No orders of the defined types were placed in this encounter.       Meds This Visit:  Requested Prescriptions      No prescriptions requested or or

## 2019-04-15 ENCOUNTER — ANCILLARY PROCEDURE (OUTPATIENT)
Dept: CARDIOLOGY | Age: 64
End: 2019-04-15
Attending: INTERNAL MEDICINE

## 2019-04-15 DIAGNOSIS — Z45.018 PACEMAKER REPROGRAMMING/CHECK: ICD-10-CM

## 2019-04-18 RX ORDER — AMLODIPINE BESYLATE 5 MG/1
TABLET ORAL
COMMUNITY
End: 2023-10-03 | Stop reason: ALTCHOICE

## 2019-05-14 ENCOUNTER — APPOINTMENT (OUTPATIENT)
Dept: LAB | Age: 64
End: 2019-05-14
Attending: NURSE PRACTITIONER
Payer: COMMERCIAL

## 2019-05-14 DIAGNOSIS — I10 ESSENTIAL HYPERTENSION WITH GOAL BLOOD PRESSURE LESS THAN 140/90: ICD-10-CM

## 2019-05-14 DIAGNOSIS — M77.51 CALCANEAL BURSITIS (HEEL), RIGHT: ICD-10-CM

## 2019-05-14 DIAGNOSIS — R97.20 ELEVATED PSA: ICD-10-CM

## 2019-05-14 DIAGNOSIS — R73.03 PREDIABETES: ICD-10-CM

## 2019-05-14 PROCEDURE — 83036 HEMOGLOBIN GLYCOSYLATED A1C: CPT

## 2019-05-14 PROCEDURE — 84153 ASSAY OF PSA TOTAL: CPT

## 2019-05-14 PROCEDURE — 84550 ASSAY OF BLOOD/URIC ACID: CPT

## 2019-05-14 PROCEDURE — 36415 COLL VENOUS BLD VENIPUNCTURE: CPT

## 2019-05-14 PROCEDURE — 80053 COMPREHEN METABOLIC PANEL: CPT

## 2019-05-14 PROCEDURE — 80061 LIPID PANEL: CPT

## 2019-05-17 ENCOUNTER — OFFICE VISIT (OUTPATIENT)
Dept: INTERNAL MEDICINE CLINIC | Facility: CLINIC | Age: 64
End: 2019-05-17

## 2019-05-17 VITALS
HEART RATE: 74 BPM | BODY MASS INDEX: 27.89 KG/M2 | DIASTOLIC BLOOD PRESSURE: 76 MMHG | WEIGHT: 184 LBS | TEMPERATURE: 98 F | HEIGHT: 68 IN | SYSTOLIC BLOOD PRESSURE: 136 MMHG

## 2019-05-17 DIAGNOSIS — M10.9 ACUTE GOUT OF RIGHT ANKLE, UNSPECIFIED CAUSE: Primary | ICD-10-CM

## 2019-05-17 DIAGNOSIS — N18.2 CKD (CHRONIC KIDNEY DISEASE) STAGE 2, GFR 60-89 ML/MIN: ICD-10-CM

## 2019-05-17 PROCEDURE — 99212 OFFICE O/P EST SF 10 MIN: CPT | Performed by: INTERNAL MEDICINE

## 2019-05-17 PROCEDURE — 99213 OFFICE O/P EST LOW 20 MIN: CPT | Performed by: INTERNAL MEDICINE

## 2019-05-17 RX ORDER — INDOMETHACIN 25 MG/1
25 CAPSULE ORAL
Qty: 30 CAPSULE | Refills: 0 | Status: ON HOLD | OUTPATIENT
Start: 2019-05-17 | End: 2019-06-15

## 2019-05-17 NOTE — PROGRESS NOTES
HPI:    Patient ID: Esvin Ortiz is a 70year old male. Chief Complaint: Foot Pain (Pt has pain in right foot x 3 days ago,)    3/26/19: right ankle/heel pain/swelling x 1 week. Uric acid 8.4. CKD 2. Indomethacin improved pain.     1117/19: 70year-old ge AmLODIPine Besylate 5 MG Oral Tab TAKE ONE TABLET BY MOUTH ONCE DAILY Disp: 90 tablet Rfl: 1   Glucose Blood In Vitro Strip Check blood sugar once daily Disp: 100 strip Rfl: 3   ketoconazole 2 % External Cream Apply to affected area 2 times daily.  Disp: 60 Comment:Gout flare. Laboratory Values:   Pertinent laboratory values reviewed. Imaging Results:   Pertinent imaging reviewed. Physical Exam:   Physical Exam   Vitals reviewed. Constitutional: He is oriented to person, place, and time.  He Symptoms consistent with gout, triggered by shrimp, uric acid 8. Advised him that he needs to stop eating shrimp, increase his hydration. Discussed risks and long term outcomes of recurrent attacks.  He is a potential candidate for prophylactic therapy gi Gout is an inflammation of a joint due to a build-up of gout crystals in the joint fluid. This occurs when there is an excess of uric acid (a normal waste product) in the body.  Uric acid builds up in the body when the kidneys are unable to filter enough of · If pain medicines have been prescribed, take them exactly as directed.    Preventing attacks  · Minimize or avoid alcohol use. Excess alcohol intake can cause a gout attack. · Limit these foods and beverages:  ? Organ meats, such as kidneys and liver  ? Gout is a disease that affects the joints. It is caused by excess uric acid in your blood that may lead to crystals forming in your joints. Left untreated, it can lead to painful foot and joint deformities and even kidney problems.  But, by treating gout ea · Take any medicines prescribed by your healthcare provider. · Lose weight if you need to. · Reduce high fructose corn syrup in meals and drinks. · Reduce or cut out alcohol, particularly beer, but also red wine and spirits.   · Control blood pressure, d · Vegetables such as asparagus, cauliflower, spinach, and mushrooms used to be thought to contribute to an increased risk for a gout attack, but recent studies show that high purine vegetables don't increase the risk for a gout attack.   Eat more of these f Gout is a painful form of arthritis caused by an excess of uric acid. This is a waste product made by the body. It builds up in the body and forms crystals that collect in the joints, causing a gout attack.  Alcohol and certain foods can trigger a gout morgan The following guidelines are recommended by the [address] [place] [address] for people with gout. Your diet should be:  · High in fiber, whole grains, fruits, and vegetables. · Low in protein (15% of calories should come from protein.  Choose lean sources · Alcohol (particularly beer, but also red wine and spirits)  · Certain meats (red meat, processed meat, turkey)  · Organ meats (kidney, liver, sweetbread)  · Shellfish (lobster, crab, shrimp, scallop, mussel)  · Certain fish (anchovy, sardine, herring, ma

## 2019-05-17 NOTE — PATIENT INSTRUCTIONS
Gout    Gout is an inflammation of a joint due to a build-up of gout crystals in the joint fluid. This occurs when there is an excess of uric acid (a normal waste product) in the body.  Uric acid builds up in the body when the kidneys are unable to filter · If pain medicines have been prescribed, take them exactly as directed.    Preventing attacks  · Minimize or avoid alcohol use. Excess alcohol intake can cause a gout attack. · Limit these foods and beverages:  ? Organ meats, such as kidneys and liver  ? Gout is a disease that affects the joints. It is caused by excess uric acid in your blood that may lead to crystals forming in your joints. Left untreated, it can lead to painful foot and joint deformities and even kidney problems.  But, by treating gout ea · Take any medicines prescribed by your healthcare provider. · Lose weight if you need to. · Reduce high fructose corn syrup in meals and drinks. · Reduce or cut out alcohol, particularly beer, but also red wine and spirits.   · Control blood pressure, d · Vegetables such as asparagus, cauliflower, spinach, and mushrooms used to be thought to contribute to an increased risk for a gout attack, but recent studies show that high purine vegetables don't increase the risk for a gout attack.   Eat more of these f Gout is a painful form of arthritis caused by an excess of uric acid. This is a waste product made by the body. It builds up in the body and forms crystals that collect in the joints, causing a gout attack.  Alcohol and certain foods can trigger a gout morgan The following guidelines are recommended by the [address] [place] [address] for people with gout. Your diet should be:  · High in fiber, whole grains, fruits, and vegetables. · Low in protein (15% of calories should come from protein.  Choose lean sources · Alcohol (particularly beer, but also red wine and spirits)  · Certain meats (red meat, processed meat, turkey)  · Organ meats (kidney, liver, sweetbread)  · Shellfish (lobster, crab, shrimp, scallop, mussel)  · Certain fish (anchovy, sardine, herring, ma

## 2019-06-10 RX ORDER — AMLODIPINE BESYLATE 5 MG/1
TABLET ORAL
Qty: 90 TABLET | Refills: 1 | Status: SHIPPED | OUTPATIENT
Start: 2019-06-10 | End: 2019-07-02

## 2019-06-14 ENCOUNTER — APPOINTMENT (OUTPATIENT)
Dept: GENERAL RADIOLOGY | Facility: HOSPITAL | Age: 64
DRG: 871 | End: 2019-06-14
Attending: EMERGENCY MEDICINE
Payer: COMMERCIAL

## 2019-06-14 ENCOUNTER — HOSPITAL ENCOUNTER (INPATIENT)
Facility: HOSPITAL | Age: 64
LOS: 4 days | Discharge: HOME | DRG: 871 | End: 2019-06-18
Attending: EMERGENCY MEDICINE | Admitting: HOSPITALIST
Payer: COMMERCIAL

## 2019-06-14 DIAGNOSIS — J18.9 PNEUMONIA OF RIGHT LOWER LOBE DUE TO INFECTIOUS ORGANISM: Primary | ICD-10-CM

## 2019-06-14 PROCEDURE — 99223 1ST HOSP IP/OBS HIGH 75: CPT | Performed by: HOSPITALIST

## 2019-06-14 PROCEDURE — 71045 X-RAY EXAM CHEST 1 VIEW: CPT | Performed by: EMERGENCY MEDICINE

## 2019-06-14 RX ORDER — HEPARIN SODIUM 5000 [USP'U]/ML
5000 INJECTION, SOLUTION INTRAVENOUS; SUBCUTANEOUS EVERY 8 HOURS
Status: DISCONTINUED | OUTPATIENT
Start: 2019-06-15 | End: 2019-06-18

## 2019-06-14 RX ORDER — ACETAMINOPHEN 325 MG/1
650 TABLET ORAL EVERY 6 HOURS PRN
Status: DISCONTINUED | OUTPATIENT
Start: 2019-06-14 | End: 2019-06-18

## 2019-06-14 RX ORDER — AMLODIPINE BESYLATE 5 MG/1
5 TABLET ORAL DAILY
Status: DISCONTINUED | OUTPATIENT
Start: 2019-06-15 | End: 2019-06-18

## 2019-06-14 RX ORDER — HYDRALAZINE HYDROCHLORIDE 20 MG/ML
10 INJECTION INTRAMUSCULAR; INTRAVENOUS EVERY 4 HOURS PRN
Status: DISCONTINUED | OUTPATIENT
Start: 2019-06-14 | End: 2019-06-18

## 2019-06-14 RX ORDER — ONDANSETRON 2 MG/ML
4 INJECTION INTRAMUSCULAR; INTRAVENOUS EVERY 6 HOURS PRN
Status: DISCONTINUED | OUTPATIENT
Start: 2019-06-14 | End: 2019-06-18

## 2019-06-14 RX ORDER — SODIUM CHLORIDE 9 MG/ML
INJECTION, SOLUTION INTRAVENOUS CONTINUOUS
Status: DISCONTINUED | OUTPATIENT
Start: 2019-06-14 | End: 2019-06-18

## 2019-06-14 RX ORDER — ACETAMINOPHEN 500 MG
1000 TABLET ORAL ONCE
Status: COMPLETED | OUTPATIENT
Start: 2019-06-14 | End: 2019-06-14

## 2019-06-15 PROCEDURE — 99233 SBSQ HOSP IP/OBS HIGH 50: CPT | Performed by: HOSPITALIST

## 2019-06-15 PROCEDURE — 99222 1ST HOSP IP/OBS MODERATE 55: CPT | Performed by: INTERNAL MEDICINE

## 2019-06-15 RX ORDER — IPRATROPIUM BROMIDE AND ALBUTEROL SULFATE 2.5; .5 MG/3ML; MG/3ML
3 SOLUTION RESPIRATORY (INHALATION) EVERY 4 HOURS PRN
Status: DISCONTINUED | OUTPATIENT
Start: 2019-06-15 | End: 2019-06-16

## 2019-06-15 NOTE — SEPSIS REASSESSMENT
[facility]    Sepsis Reassessment Note    /56   Pulse (!) 136   Temp (!) 100.7 °F (38.2 °C) (Oral)   Resp 22   Wt 86.2 kg   SpO2 90%   BMI 28.89 kg/m²      9:21 PM    Cardiac:  Regularity: Regular  Rate: Normal  Heart Sounds: S1,S2

## 2019-06-15 NOTE — CONSULTS
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

## 2019-06-15 NOTE — ED PROVIDER NOTES
Patient Seen in: [facility] AND [facility] Emergency Department    History   Patient presents with:  Dyspnea DARON SOB (respiratory)    Stated Complaint: Fever,     HPI    Patient is a 78-year-old male with a history of hypertension and a pacemaker.   He has not fe Oropharynx is clear and moist.   Eyes: Conjunctivae and EOM are normal. Pupils are equal, round, and reactive to light. Neck: Neck supple. Cardiovascular: Normal rate, regular rhythm, normal heart sounds and intact distal pulses.     Pulmonary/Chest: Ef ---------                               -----------         ------                     CBC W/ DIFFERENTIAL[[id number]]          Abnormal            Final result                 Please view results for these tests on the individual orders.    URINALYSIS WIT lobe due to infectious organism [facility]) J18.1 6/14/2019 Unknown            Present on Admission  Date Reviewed: 5/17/2019          ICD-10-CM Noted POA    Pneumonia of right lower lobe due to infectious organism [facility]) J18.1 6/14/2019 Unknown

## 2019-06-15 NOTE — H&P
[facility] Patient Status:  Emergency    1955 MRN [id number]   Location [address] Attending Marlene Sierra MD   Hosp Day # 0 PCP Phyllis Newell MD     Date:   (SEE COMMENTS)    Comment:Gout flare. Home Medications:  Prior to Admission Medications   Prescriptions Last Dose Informant Patient Reported? Taking?    Blood Glucose Monitoring Suppl (TRUE METRIX METER) W/DEVICE Does not apply Kit   No No   Si kit b Normocephalic, oral mucosa is moist.  Head:  Normocephalic, atraumatic. Neck:  Supple, non-tender, no carotid bruit, no jugular venous distention, no lymphadenopathy, no thyromegaly.   Respiratory: Basilar crackles, respirations are mildly labored, breath pressures greater than 160. Prior third-degree heart block  Pacemaker in place, monitor as needed.     Prophylaxis  Subcutaneous heparin    CODE STATUS  Full    Primary care physician  Phyllis Newell MD    Disposition  Clinical course will dictate

## 2019-06-15 NOTE — PLAN OF CARE
Pt with fevers, tylenol given prn, up ad bouchra, currently on room air, IS taught and encouraged up to 1000. Cont. On antibiotics seen by Dr Isadora Ortiz. Will cont. To monitor, discussed with pt.        Problem: Patient Centered Care  Goal: Patient preferences are i ability to be responsible for managing their own health  - Refer to Case Management Department for coordinating discharge planning if the patient needs post-hospital services based on physician/LIP order or complex needs related to functional status, cogni medications as ordered and appropriate  - Administer supportive blood products/factors as ordered and appropriate  Outcome: Progressing

## 2019-06-15 NOTE — ED NOTES
Orders for admission, patient is aware of plan and ready to go upstairs.  Any questions, please call ED RN Pepito Navarro at extension [phone]

## 2019-06-15 NOTE — PLAN OF CARE
Problem: Patient Centered Care  Goal: Patient preferences are identified and integrated in the patient's plan of care  Description  Interventions:  - What would you like us to know as we care for you?  Lives at home with wife and daughter  - Provide timel physician/LIP order or complex needs related to functional status, cognitive ability or social support system  Outcome: Progressing     Problem: RESPIRATORY - ADULT  Goal: Achieves optimal ventilation and oxygenation  Description  INTERVENTIONS:  - Assess

## 2019-06-15 NOTE — PROGRESS NOTES
[facility]    Progress Note    Nelly Lara Patient Status:  Inpatient    1955 MRN [id number]   Location [facility] 5SW/SE Attending Tish Oliver MD   Hosp Day # 1 PCP Arsenio Lou MD       SUBJECTIVE:    Feel 6-8 weeks.   Dictated by (CST): Yaima Valenzuela MD on 6/14/2019 at 21:49     Approved by (CST): Yaima Valenzuela MD on 6/14/2019 at 21:51            Meds:     Current Facility-Administered Medications:  ipratropium-albuterol (DUONEB) nebulizer solution 3 workup      Yeimi White MD  6/15/2019  12:46 PM

## 2019-06-16 PROCEDURE — 99232 SBSQ HOSP IP/OBS MODERATE 35: CPT | Performed by: INTERNAL MEDICINE

## 2019-06-16 PROCEDURE — 99233 SBSQ HOSP IP/OBS HIGH 50: CPT | Performed by: HOSPITALIST

## 2019-06-16 RX ORDER — POTASSIUM CHLORIDE 20 MEQ/1
40 TABLET, EXTENDED RELEASE ORAL EVERY 4 HOURS
Status: COMPLETED | OUTPATIENT
Start: 2019-06-16 | End: 2019-06-16

## 2019-06-16 RX ORDER — IPRATROPIUM BROMIDE AND ALBUTEROL SULFATE 2.5; .5 MG/3ML; MG/3ML
3 SOLUTION RESPIRATORY (INHALATION)
Status: DISCONTINUED | OUTPATIENT
Start: 2019-06-16 | End: 2019-06-18

## 2019-06-16 NOTE — PLAN OF CARE
Problem: Patient Centered Care  Goal: Patient preferences are identified and integrated in the patient's plan of care  Description  Interventions:  - What would you like us to know as we care for you?  Lives at home with wife and daughter  - Provide timel physician/LIP order or complex needs related to functional status, cognitive ability or social support system  Outcome: Progressing     Problem: RESPIRATORY - ADULT  Goal: Achieves optimal ventilation and oxygenation  Description  INTERVENTIONS:  - Assess sounds diminished bilaterally. VSS. Wife at bedside. No acute distress noted.

## 2019-06-16 NOTE — PROGRESS NOTES
[facility]    Progress Note    Tramaine Dinh Patient Status:  Inpatient    1955 MRN [id number]   Location [facility] 5SW/SE Attending Karol Painter MD   Hosp Day # 2 PCP Asia Tam MD       SUBJECTIVE:    Slow Facility-Administered Medications:  Potassium Chloride ER (K-DUR M20) CR tab 40 mEq 40 mEq Oral Q4H   ipratropium-albuterol (DUONEB) nebulizer solution 3 mL 3 mL Nebulization 6 times per day   amLODIPine Besylate (NORVASC) tab 5 mg 5 mg Oral Daily   ondans

## 2019-06-16 NOTE — PLAN OF CARE
Problem: Patient Centered Care  Goal: Patient preferences are identified and integrated in the patient's plan of care  Description  Interventions:  - What would you like us to know as we care for you?  Lives at home with wife and daughter  - Provide timel

## 2019-06-16 NOTE — PROGRESS NOTES
[facility]     Progress Note        Leah Magdalenojasmin Patient Status:  Inpatient    1955 MRN [id number]   Location [facility] 5SW/SE Attending Raymond Crawford MD   Hosp Day # 2 PCP Charito Rosen MD       Subjective:   Pa 06/16/2019     06/16/2019    K 3.5 06/16/2019     06/16/2019    CO2 23.0 06/16/2019     06/16/2019    CA 8.0 06/16/2019       Xr Chest Ap Portable  (cpt=71045)    Result Date: 6/14/2019  CONCLUSION:   Hypoinflated lungs.   Left greater th

## 2019-06-17 PROCEDURE — 99233 SBSQ HOSP IP/OBS HIGH 50: CPT | Performed by: HOSPITALIST

## 2019-06-17 PROCEDURE — 99232 SBSQ HOSP IP/OBS MODERATE 35: CPT | Performed by: INTERNAL MEDICINE

## 2019-06-17 NOTE — PLAN OF CARE
Pt alert. Reports slight headache. Prn tylenol given. SOB noted with exertion. Cough is congested. BL crackles noted in lungs. VSS. No acute distress noted.     Problem: Patient Centered Care  Goal: Patient preferences are identified and integrated i for managing their own health  - Refer to Case Management Department for coordinating discharge planning if the patient needs post-hospital services based on physician/LIP order or complex needs related to functional status, cognitive ability or social sup appropriate  - Administer supportive blood products/factors as ordered and appropriate  Outcome: Progressing

## 2019-06-17 NOTE — RESPIRATORY THERAPY NOTE
Patient seen for pneumonia education. Pneumonia education packet given and care guidelines reviewed. Incentive spirometer encouraged.

## 2019-06-17 NOTE — PROGRESS NOTES
[facility]    Progress Note      Assessment and Plan:   1. Community-acquired pneumonia–improving clinically. Recommendations: Repeat chest x-ray, ongoing antibiotics, will follow clinically. Nebulizer therapy and discharge planning.

## 2019-06-18 ENCOUNTER — APPOINTMENT (OUTPATIENT)
Dept: GENERAL RADIOLOGY | Facility: HOSPITAL | Age: 64
DRG: 871 | End: 2019-06-18
Attending: INTERNAL MEDICINE
Payer: COMMERCIAL

## 2019-06-18 VITALS
TEMPERATURE: 98 F | HEART RATE: 83 BPM | OXYGEN SATURATION: 94 % | SYSTOLIC BLOOD PRESSURE: 129 MMHG | HEIGHT: 68 IN | WEIGHT: 189.63 LBS | RESPIRATION RATE: 18 BRPM | BODY MASS INDEX: 28.74 KG/M2 | DIASTOLIC BLOOD PRESSURE: 73 MMHG

## 2019-06-18 PROCEDURE — 99232 SBSQ HOSP IP/OBS MODERATE 35: CPT | Performed by: INTERNAL MEDICINE

## 2019-06-18 PROCEDURE — 71046 X-RAY EXAM CHEST 2 VIEWS: CPT | Performed by: INTERNAL MEDICINE

## 2019-06-18 PROCEDURE — 99239 HOSP IP/OBS DSCHRG MGMT >30: CPT | Performed by: HOSPITALIST

## 2019-06-18 RX ORDER — POTASSIUM CHLORIDE 20 MEQ/1
40 TABLET, EXTENDED RELEASE ORAL ONCE
Status: COMPLETED | OUTPATIENT
Start: 2019-06-18 | End: 2019-06-18

## 2019-06-18 RX ORDER — LEVOFLOXACIN 750 MG/1
750 TABLET ORAL DAILY
Qty: 4 TABLET | Refills: 0 | Status: SHIPPED | OUTPATIENT
Start: 2019-06-18 | End: 2019-06-22

## 2019-06-18 NOTE — DISCHARGE SUMMARY
[facility]    Discharge Summary    Rolo Laughlin Patient Status:  Inpatient    1955 MRN [id number]   Location [facility] 5SW/SE Attending Nakul Cortés MD   Hosp Day # 4 PCP Clay Thompson MD     Date of Admission shows evidence of right lower lobe pneumonia           Hospital Course:     Community-acquired pneumonia with sepsis  Patient started on Zosyn 3.375 g IV piggyback every 8 hours  Cultures pending - ngtd  IVFs - stop on d/c  Lactic acid trending down  Neg u this discharge

## 2019-06-18 NOTE — PROGRESS NOTES
[facility]    Progress Note    Jefry Banda Patient Status:  Inpatient    1955 MRN [id number]   Location [facility] 5SW/SE Attending Srini Thomas MD   Hosp Day # 3 PCP Tj Rich MD       SUBJECTIVE:    Feel Meds:     Current Facility-Administered Medications:  ipratropium-albuterol (DUONEB) nebulizer solution 3 mL 3 mL Nebulization 6 times per day   amLODIPine Besylate (NORVASC) tab 5 mg 5 mg Oral Daily   ondansetron HCl (ZOFRAN) injection 4 mg 4 mg Int

## 2019-06-18 NOTE — PLAN OF CARE
Problem: Patient Centered Care  Goal: Patient preferences are identified and integrated in the patient's plan of care  Description  Interventions:  - What would you like us to know as we care for you?  Lives at home with wife and daughter  - Provide timel physician/LIP order or complex needs related to functional status, cognitive ability or social support system  Outcome: Progressing     Problem: RESPIRATORY - ADULT  Goal: Achieves optimal ventilation and oxygenation  Description  INTERVENTIONS:  - Assess halls independently with no complaints of shortness of breath. Had repeat CXR, awaiting result. Possible discharge per pulm. Will continue to monitor.

## 2019-06-18 NOTE — PLAN OF CARE
IV abx as ordered  IV fluids as ordered  Pt up ad bouchra  No PRN medications given during shift, family at bedside    Problem: Patient Centered Care  Goal: Patient preferences are identified and integrated in the patient's plan of care  Description  Mei Rod Management Department for coordinating discharge planning if the patient needs post-hospital services based on physician/LIP order or complex needs related to functional status, cognitive ability or social support system  Outcome: Progressing     Problem: products/factors as ordered and appropriate  Outcome: Progressing

## 2019-06-18 NOTE — PLAN OF CARE
Problem: Patient Centered Care  Goal: Patient preferences are identified and integrated in the patient's plan of care  Description  Interventions:  - What would you like us to know as we care for you?  Lives at home with wife and daughter  - Provide timel Consider post-discharge preferences of patient/family/discharge partner  - Complete POLST form as appropriate  - Assess patient's ability to be responsible for managing their own health  - Refer to Case Management Department for coordinating discharge plan Evaluate fluid balance, assess for edema, trend weights  6/18/2019 1358 by Chayo Deleon RN  Outcome: Adequate for Discharge  6/18/2019 1147 by Chayo Deleon RN  Outcome: Progressing     Problem: HEMATOLOGIC - ADULT  Goal: Maintains hematologic stability

## 2019-06-18 NOTE — PROGRESS NOTES
[facility]     Progress Note        Kathy Cordial Patient Status:  Inpatient    1955 MRN [id number]   Location [facility] 5SW/SE Attending Madi Womack MD   Hosp Day # 4 PCP Claudio Doanhue MD       Subjective:   Pa 06/18/2019     06/18/2019    K 3.7 06/18/2019     06/18/2019    CO2 25.0 06/18/2019     06/18/2019    CA 8.5 06/18/2019       Xr Chest Pa + Lat Chest (cpt=71046)    Result Date: 6/18/2019  CONCLUSION:  1. Cardiomegaly.  2. Prominent too

## 2019-06-19 ENCOUNTER — PATIENT OUTREACH (OUTPATIENT)
Dept: CASE MANAGEMENT | Age: 64
End: 2019-06-19

## 2019-06-19 DIAGNOSIS — J18.9 PNEUMONIA OF RIGHT LOWER LOBE DUE TO INFECTIOUS ORGANISM: ICD-10-CM

## 2019-06-19 DIAGNOSIS — Z02.9 ENCOUNTERS FOR UNSPECIFIED ADMINISTRATIVE PURPOSE: ICD-10-CM

## 2019-06-19 PROCEDURE — 1111F DSCHRG MED/CURRENT MED MERGE: CPT

## 2019-06-19 NOTE — PROGRESS NOTES
Initial Post Discharge Follow Up   Discharge Date: 6/18/19  Contact Date: 6/19/2019    Consent Verification:  Assessment Completed With: Patient  Spouse: Chana Francia received per patient?  written  HIPAA Verified?   Yes    Discharge Dx:   FER Ramos once daily Disp: 100 strip Rfl: 3   Blood Glucose Monitoring Suppl (TRUE METRIX METER) W/DEVICE Does not apply Kit 1 kit by Does not apply route once daily.  Disp: 1 kit Rfl: 0     • When you were leaving the hospital were any medication changes discussed w received while in the hospital?  good        Interventions by NCM: All d/c instructions reviewed with the pt's spouse. Reviewed when to call MD vs when to call 911 or go the ED. Discussed s/s of PNA.  Spouse verbalized understanding and will contact the off

## 2019-06-24 ENCOUNTER — APPOINTMENT (OUTPATIENT)
Dept: CARDIOLOGY | Age: 64
End: 2019-06-24
Attending: INTERNAL MEDICINE

## 2019-07-02 ENCOUNTER — OFFICE VISIT (OUTPATIENT)
Dept: INTERNAL MEDICINE CLINIC | Facility: CLINIC | Age: 64
End: 2019-07-02

## 2019-07-02 VITALS
SYSTOLIC BLOOD PRESSURE: 123 MMHG | TEMPERATURE: 97 F | WEIGHT: 179 LBS | HEART RATE: 76 BPM | DIASTOLIC BLOOD PRESSURE: 72 MMHG | BODY MASS INDEX: 27.13 KG/M2 | RESPIRATION RATE: 12 BRPM | HEIGHT: 68 IN

## 2019-07-02 DIAGNOSIS — Z87.39 HISTORY OF GOUT: ICD-10-CM

## 2019-07-02 DIAGNOSIS — I10 ESSENTIAL HYPERTENSION WITH GOAL BLOOD PRESSURE LESS THAN 140/90: ICD-10-CM

## 2019-07-02 DIAGNOSIS — J18.9 PNEUMONIA OF RIGHT LOWER LOBE DUE TO INFECTIOUS ORGANISM: Primary | ICD-10-CM

## 2019-07-02 DIAGNOSIS — R73.03 PREDIABETES: ICD-10-CM

## 2019-07-02 PROCEDURE — 1111F DSCHRG MED/CURRENT MED MERGE: CPT | Performed by: INTERNAL MEDICINE

## 2019-07-02 PROCEDURE — 90471 IMMUNIZATION ADMIN: CPT | Performed by: INTERNAL MEDICINE

## 2019-07-02 PROCEDURE — 99495 TRANSJ CARE MGMT MOD F2F 14D: CPT | Performed by: INTERNAL MEDICINE

## 2019-07-02 PROCEDURE — 90670 PCV13 VACCINE IM: CPT | Performed by: INTERNAL MEDICINE

## 2019-07-04 NOTE — PROGRESS NOTES
HPI:    Kathy Elizondo is a 70year old male here today for hospital follow up.    He was discharged from Inpatient hospital, [facility]  to Home   Admission Date: 6/14/19   Discharge Date: 6/18/19  Hospital Discharge Diagnoses (since 6/4/2019)     PN medications on file prior to visit.        HISTORY: reconciled and reviewed with patient  He  has a past medical history of AV block, 3rd degree ([facility] [address] Essential hypertension (5/27/2015), Gout, High blood pressure, LBBB (left bundle branch block) (5/27/2015 conjunctiva are clear  NECK: supple, no adenopathy, no bruits  CHEST: no chest tenderness  LUNGS: clear to auscultation  CARDIO: RRR without murmur  GI: good BS's, no masses, HSM or tenderness  MUSCULOSKELETAL: back is not tender, FROM of the extremities

## 2019-08-13 ENCOUNTER — HOSPITAL ENCOUNTER (OUTPATIENT)
Dept: GENERAL RADIOLOGY | Age: 64
Discharge: HOME | End: 2019-08-13
Attending: INTERNAL MEDICINE
Payer: COMMERCIAL

## 2019-08-13 DIAGNOSIS — J18.9 PNEUMONIA OF RIGHT LOWER LOBE DUE TO INFECTIOUS ORGANISM: ICD-10-CM

## 2019-08-13 PROCEDURE — 71046 X-RAY EXAM CHEST 2 VIEWS: CPT | Performed by: INTERNAL MEDICINE

## 2019-08-26 ENCOUNTER — ANCILLARY PROCEDURE (OUTPATIENT)
Dept: CARDIOLOGY | Age: 64
End: 2019-08-26
Attending: INTERNAL MEDICINE

## 2019-08-26 VITALS
WEIGHT: 190.2 LBS | RESPIRATION RATE: 14 BRPM | HEART RATE: 71 BPM | SYSTOLIC BLOOD PRESSURE: 132 MMHG | DIASTOLIC BLOOD PRESSURE: 70 MMHG | BODY MASS INDEX: 28.92 KG/M2

## 2019-08-26 DIAGNOSIS — Z45.018 FITTING AND ADJUSTMENT OF CARDIAC PACEMAKER: Primary | ICD-10-CM

## 2019-08-26 PROCEDURE — 93280 PM DEVICE PROGR EVAL DUAL: CPT | Performed by: INTERNAL MEDICINE

## 2019-08-27 PROCEDURE — 93280 PM DEVICE PROGR EVAL DUAL: CPT | Performed by: INTERNAL MEDICINE

## 2019-09-30 PROCEDURE — 93296 REM INTERROG EVL PM/IDS: CPT | Performed by: INTERNAL MEDICINE

## 2019-11-13 ENCOUNTER — OFFICE VISIT (OUTPATIENT)
Dept: INTERNAL MEDICINE CLINIC | Facility: CLINIC | Age: 64
End: 2019-11-13

## 2019-11-13 ENCOUNTER — LAB ENCOUNTER (OUTPATIENT)
Dept: LAB | Age: 64
End: 2019-11-13
Attending: INTERNAL MEDICINE
Payer: COMMERCIAL

## 2019-11-13 VITALS
HEIGHT: 68 IN | HEART RATE: 65 BPM | TEMPERATURE: 98 F | BODY MASS INDEX: 29.1 KG/M2 | DIASTOLIC BLOOD PRESSURE: 76 MMHG | SYSTOLIC BLOOD PRESSURE: 126 MMHG | WEIGHT: 192 LBS

## 2019-11-13 DIAGNOSIS — E04.1 THYROID NODULE: ICD-10-CM

## 2019-11-13 DIAGNOSIS — I44.7 LBBB (LEFT BUNDLE BRANCH BLOCK): ICD-10-CM

## 2019-11-13 DIAGNOSIS — Z87.39 HISTORY OF GOUT: ICD-10-CM

## 2019-11-13 DIAGNOSIS — R73.03 PREDIABETES: ICD-10-CM

## 2019-11-13 DIAGNOSIS — I10 ESSENTIAL HYPERTENSION WITH GOAL BLOOD PRESSURE LESS THAN 140/90: ICD-10-CM

## 2019-11-13 DIAGNOSIS — Z00.00 ANNUAL PHYSICAL EXAM: ICD-10-CM

## 2019-11-13 DIAGNOSIS — Z00.00 ANNUAL PHYSICAL EXAM: Primary | ICD-10-CM

## 2019-11-13 LAB
ABSOLUTE IMMATURE GRANULOCYTES (OFFPRE24): NORMAL
ALBUMIN SERPL-MCNC: 3.9 G/DL
ALBUMIN/GLOB SERPL: 1.3 {RATIO}
ALP SERPL-CCNC: 84 U/L
ALT SERPL-CCNC: 36 U/L
ANION GAP SERPL CALC-SCNC: 6 MMOL/L
AST SERPL-CCNC: 26 U/L
BASO+EOS+MONOS # BLD: NORMAL 10*3/UL
BASO+EOS+MONOS NFR BLD: NORMAL %
BASOPHILS # BLD: NORMAL 10*3/UL
BASOPHILS NFR BLD: NORMAL %
BILIRUB SERPL-MCNC: 0.5 MG/DL
BUN SERPL-MCNC: 16 MG/DL
BUN/CREAT SERPL: 14.3
CALCIUM SERPL-MCNC: 8.6 MG/DL
CHLORIDE SERPL-SCNC: 105 MMOL/L
CHOLEST SERPL-MCNC: 89 MG/DL
CHOLEST/HDLC SERPL: NORMAL {RATIO}
CO2 SERPL-SCNC: 29 MMOL/L
CREAT SERPL-MCNC: 1.12 MG/DL
DIFFERENTIAL METHOD BLD: NORMAL
EOSINOPHIL # BLD: NORMAL 10*3/UL
EOSINOPHIL NFR BLD: NORMAL %
ERYTHROCYTE [DISTWIDTH] IN BLOOD: NORMAL %
GLOBULIN SER-MCNC: 3.1 G/DL
GLUCOSE SERPL-MCNC: 105 MG/DL
HCT VFR BLD CALC: 43.2 %
HDLC SERPL-MCNC: 18 MG/DL
HGB BLD-MCNC: 14.7 G/DL
IMMATURE GRANULOCYTES (OFFPRE25): NORMAL
LDLC SERPL CALC-MCNC: 60 MG/DL
LENGTH OF FAST TIME PATIENT: NORMAL H
LENGTH OF FAST TIME PATIENT: NORMAL H
LYMPHOCYTES # BLD: NORMAL 10*3/UL
LYMPHOCYTES NFR BLD: NORMAL %
MCH RBC QN AUTO: NORMAL PG
MCHC RBC AUTO-ENTMCNC: NORMAL G/DL
MCV RBC AUTO: NORMAL FL
MONOCYTES # BLD: NORMAL 10*3/UL
MONOCYTES NFR BLD: NORMAL %
MPV (OFFPRE2): NORMAL
NEUTROPHILS # BLD: NORMAL 10*3/UL
NEUTROPHILS NFR BLD: NORMAL %
NONHDLC SERPL-MCNC: 71 MG/DL
NRBC BLD MANUAL-RTO: NORMAL %
PLAT MORPH BLD: NORMAL
PLATELET # BLD: 240 10*3/UL
POTASSIUM SERPL-SCNC: 3.5 MMOL/L
PROT SERPL-MCNC: 7 G/DL
RBC # BLD: 5.02 10*6/UL
RBC MORPH BLD: NORMAL
SODIUM SERPL-SCNC: 140 MMOL/L
TRIGL SERPL-MCNC: 53 MG/DL
VLDLC SERPL CALC-MCNC: 11 MG/DL
WBC # BLD: 6.4 10*3/UL
WBC MORPH BLD: NORMAL

## 2019-11-13 PROCEDURE — 85025 COMPLETE CBC W/AUTO DIFF WBC: CPT

## 2019-11-13 PROCEDURE — 81001 URINALYSIS AUTO W/SCOPE: CPT

## 2019-11-13 PROCEDURE — 90662 IIV NO PRSV INCREASED AG IM: CPT | Performed by: INTERNAL MEDICINE

## 2019-11-13 PROCEDURE — 84153 ASSAY OF PSA TOTAL: CPT

## 2019-11-13 PROCEDURE — 83036 HEMOGLOBIN GLYCOSYLATED A1C: CPT

## 2019-11-13 PROCEDURE — 84550 ASSAY OF BLOOD/URIC ACID: CPT

## 2019-11-13 PROCEDURE — 80053 COMPREHEN METABOLIC PANEL: CPT

## 2019-11-13 PROCEDURE — 84443 ASSAY THYROID STIM HORMONE: CPT

## 2019-11-13 PROCEDURE — 90471 IMMUNIZATION ADMIN: CPT | Performed by: INTERNAL MEDICINE

## 2019-11-13 PROCEDURE — 36415 COLL VENOUS BLD VENIPUNCTURE: CPT

## 2019-11-13 PROCEDURE — 80061 LIPID PANEL: CPT

## 2019-11-13 PROCEDURE — 84481 FREE ASSAY (FT-3): CPT

## 2019-11-13 PROCEDURE — 84439 ASSAY OF FREE THYROXINE: CPT

## 2019-11-13 PROCEDURE — 99397 PER PM REEVAL EST PAT 65+ YR: CPT | Performed by: INTERNAL MEDICINE

## 2019-11-13 RX ORDER — AMLODIPINE BESYLATE 5 MG/1
1 TABLET ORAL DAILY
COMMUNITY
End: 2020-03-06

## 2019-11-13 NOTE — PROGRESS NOTES
HPI:    Patient ID: Ada Guzman is a 70year old male. Patient presents today for his annual physical.  He has a known history of hypertension, gout, history of cardiac pacemaker placement, and prediabetes.       Review of Systems   Constitutional: Neg Effort normal and breath sounds normal. No respiratory distress. He has no wheezes. He has no rales. Abdominal: Soft. Bowel sounds are normal. He exhibits no distension and no mass. There is no tenderness. There is no rebound and no guarding.    Gisella Lancaster incidentally on CT scan of the cervical spine that was ordered by psychiatrist Dr. Kim Souza back in 2016. Patient states that this was never discussed with him before.   I told him he should get his ultrasound to follow-up on this and also will do a thyroid fun

## 2019-11-19 ENCOUNTER — HOSPITAL ENCOUNTER (OUTPATIENT)
Dept: GENERAL RADIOLOGY | Age: 64
Discharge: HOME | End: 2019-11-19
Attending: INTERNAL MEDICINE
Payer: COMMERCIAL

## 2019-11-19 ENCOUNTER — OFFICE VISIT (OUTPATIENT)
Dept: INTERNAL MEDICINE CLINIC | Facility: CLINIC | Age: 64
End: 2019-11-19

## 2019-11-19 VITALS
HEART RATE: 72 BPM | WEIGHT: 191 LBS | SYSTOLIC BLOOD PRESSURE: 146 MMHG | OXYGEN SATURATION: 97 % | BODY MASS INDEX: 29 KG/M2 | TEMPERATURE: 97 F | DIASTOLIC BLOOD PRESSURE: 83 MMHG

## 2019-11-19 DIAGNOSIS — J18.9 PNEUMONIA SYMPTOMS: Primary | ICD-10-CM

## 2019-11-19 DIAGNOSIS — J18.9 PNEUMONIA SYMPTOMS: ICD-10-CM

## 2019-11-19 PROCEDURE — 99214 OFFICE O/P EST MOD 30 MIN: CPT | Performed by: INTERNAL MEDICINE

## 2019-11-19 PROCEDURE — 71046 X-RAY EXAM CHEST 2 VIEWS: CPT | Performed by: INTERNAL MEDICINE

## 2019-11-19 RX ORDER — AZITHROMYCIN 250 MG/1
TABLET, FILM COATED ORAL
Qty: 6 TABLET | Refills: 0 | Status: SHIPPED | OUTPATIENT
Start: 2019-11-19 | End: 2020-08-28 | Stop reason: ALTCHOICE

## 2019-11-19 NOTE — PROGRESS NOTES
History of Present Illness   Patient ID: Jefry Banda is a 70year old male. Chief Complaint: Cough (c/o cough and congestion. )      Cough   This is a new problem. Episode onset: In [place] went to [place] suffered [place].  Returned [facility] november 3; c Posterior oropharyngeal erythema present. No oropharyngeal exudate. Eyes: Pupils are equal, round, and reactive to light. Conjunctivae are normal. Right eye exhibits no discharge. Left eye exhibits no discharge. Neck: Neck supple.    Cardiovascular: Nor 11/13/2019    LDL 60 11/13/2019    VLDL 11 11/13/2019    NONHDLC 71 11/13/2019    CALCNONHDL 78 09/27/2016     The ASCVD Risk score (Naomy Alvarez., et al., 2013) failed to calculate for the following reasons:     The valid HDL cholesterol range is 20 to 100 m daily., Disp: 6 tablet, Rfl: 0  •  amLODIPine Besylate 5 MG Oral Tab, Take 1 tablet by mouth daily. , Disp: , Rfl:   •  aspirin 81 MG Oral Tab, Take 81 mg by mouth daily.  Taking an occasional aspirin, Disp: , Rfl:   •  Glucose Blood In Vitro Strip, Check bl Impression: CONCLUSION:   1. Overall improvement in aeration of the lung fields from August 13, 2019.  2. Borderline cardiomegaly. 3. Atherosclerosis. 4. Scarring/atelectasis. 5. Pacemaker. Dictated by (CST):  Luiz Wilson MD on 11/19

## 2019-12-05 SDOH — HEALTH STABILITY: MENTAL HEALTH: HOW OFTEN DO YOU HAVE A DRINK CONTAINING ALCOHOL?: NEVER

## 2019-12-06 ENCOUNTER — OFFICE VISIT (OUTPATIENT)
Dept: CARDIOLOGY | Age: 64
End: 2019-12-06

## 2019-12-06 VITALS
DIASTOLIC BLOOD PRESSURE: 70 MMHG | SYSTOLIC BLOOD PRESSURE: 126 MMHG | OXYGEN SATURATION: 96 % | WEIGHT: 190 LBS | BODY MASS INDEX: 28.79 KG/M2 | HEART RATE: 78 BPM | HEIGHT: 68 IN

## 2019-12-06 DIAGNOSIS — I44.1 MOBITZ II: ICD-10-CM

## 2019-12-06 DIAGNOSIS — I10 ESSENTIAL HYPERTENSION: Primary | ICD-10-CM

## 2019-12-06 DIAGNOSIS — Z95.0 PACEMAKER: ICD-10-CM

## 2019-12-06 PROCEDURE — 99214 OFFICE O/P EST MOD 30 MIN: CPT | Performed by: INTERNAL MEDICINE

## 2019-12-06 PROCEDURE — 3078F DIAST BP <80 MM HG: CPT | Performed by: INTERNAL MEDICINE

## 2019-12-06 PROCEDURE — 3074F SYST BP LT 130 MM HG: CPT | Performed by: INTERNAL MEDICINE

## 2019-12-06 SDOH — HEALTH STABILITY: MENTAL HEALTH: HOW OFTEN DO YOU HAVE A DRINK CONTAINING ALCOHOL?: NEVER

## 2019-12-06 ASSESSMENT — PATIENT HEALTH QUESTIONNAIRE - PHQ9
SUM OF ALL RESPONSES TO PHQ9 QUESTIONS 1 AND 2: 0
1. LITTLE INTEREST OR PLEASURE IN DOING THINGS: NOT AT ALL
2. FEELING DOWN, DEPRESSED OR HOPELESS: NOT AT ALL
SUM OF ALL RESPONSES TO PHQ9 QUESTIONS 1 AND 2: 0

## 2019-12-20 ENCOUNTER — HOSPITAL ENCOUNTER (OUTPATIENT)
Dept: ULTRASOUND IMAGING | Age: 64
Discharge: HOME | End: 2019-12-20
Attending: INTERNAL MEDICINE
Payer: COMMERCIAL

## 2019-12-20 DIAGNOSIS — E04.1 THYROID NODULE: ICD-10-CM

## 2019-12-20 PROCEDURE — 76536 US EXAM OF HEAD AND NECK: CPT | Performed by: INTERNAL MEDICINE

## 2019-12-29 ENCOUNTER — HOSPITAL ENCOUNTER (OUTPATIENT)
Age: 64
Discharge: HOME | End: 2019-12-29
Attending: EMERGENCY MEDICINE
Payer: COMMERCIAL

## 2019-12-29 VITALS
RESPIRATION RATE: 18 BRPM | OXYGEN SATURATION: 98 % | TEMPERATURE: 98 F | HEART RATE: 96 BPM | DIASTOLIC BLOOD PRESSURE: 84 MMHG | SYSTOLIC BLOOD PRESSURE: 139 MMHG

## 2019-12-29 DIAGNOSIS — J11.1 INFLUENZA: Primary | ICD-10-CM

## 2019-12-29 LAB
POCT INFLUENZA A: NEGATIVE
POCT INFLUENZA B: NEGATIVE

## 2019-12-29 PROCEDURE — 99214 OFFICE O/P EST MOD 30 MIN: CPT

## 2019-12-29 PROCEDURE — 87502 INFLUENZA DNA AMP PROBE: CPT | Performed by: EMERGENCY MEDICINE

## 2019-12-29 PROCEDURE — 99213 OFFICE O/P EST LOW 20 MIN: CPT

## 2019-12-29 RX ORDER — OSELTAMIVIR PHOSPHATE 75 MG/1
75 CAPSULE ORAL 2 TIMES DAILY
Qty: 10 CAPSULE | Refills: 0 | Status: SHIPPED | OUTPATIENT
Start: 2019-12-29 | End: 2020-01-03

## 2019-12-29 NOTE — ED PROVIDER NOTES
Patient Seen in: [facility] In [address] [place] [address]      History   Patient presents with: Body ache and/or chills    Stated Complaint: chills aches    HPI  Yesterday started with tactile temperatures, chills and body aches.   He has no energy t 139/84   Pulse 96   Temp 97.8 °F (36.6 °C) (Oral)   Resp 18   SpO2 98%         Physical Exam  Vitals signs and nursing note reviewed. Constitutional:       General: He is not in acute distress. Appearance: He is well-developed. Comments:  Well ap reviewed.         Disposition and Plan     Clinical Impression:  Influenza  (primary encounter diagnosis)    Disposition:  Discharge  12/29/2019  4:29 pm    Follow-up:  Bettie Giron MD  [address] [facility]

## 2020-01-06 PROCEDURE — 93294 REM INTERROG EVL PM/LDLS PM: CPT | Performed by: INTERNAL MEDICINE

## 2020-01-06 PROCEDURE — 93296 REM INTERROG EVL PM/IDS: CPT | Performed by: INTERNAL MEDICINE

## 2020-01-13 ENCOUNTER — ANCILLARY PROCEDURE (OUTPATIENT)
Dept: CARDIOLOGY | Age: 65
End: 2020-01-13
Attending: INTERNAL MEDICINE

## 2020-01-13 DIAGNOSIS — Z95.0 CARDIAC PACEMAKER: ICD-10-CM

## 2020-03-05 NOTE — TELEPHONE ENCOUNTER
Please advise on request. Rx listed as external report.   Hypertensive Medications  Protocol Criteria:  · Appointment scheduled in the past 6 months or in the next 3 months  · BMP or CMP in the past 12 months  · Creatinine result < 2  Recent Outpatient Visi

## 2020-03-06 RX ORDER — AMLODIPINE BESYLATE 5 MG/1
TABLET ORAL
Qty: 90 TABLET | Refills: 1 | Status: SHIPPED | OUTPATIENT
Start: 2020-03-06 | End: 2020-08-23

## 2020-04-13 ENCOUNTER — ANCILLARY PROCEDURE (OUTPATIENT)
Dept: CARDIOLOGY | Age: 65
End: 2020-04-13
Attending: INTERNAL MEDICINE

## 2020-04-13 DIAGNOSIS — Z95.0 CARDIAC PACEMAKER: ICD-10-CM

## 2020-04-13 PROCEDURE — 93296 REM INTERROG EVL PM/IDS: CPT | Performed by: INTERNAL MEDICINE

## 2020-04-13 PROCEDURE — 93294 REM INTERROG EVL PM/LDLS PM: CPT | Performed by: INTERNAL MEDICINE

## 2020-07-20 PROCEDURE — 93294 REM INTERROG EVL PM/LDLS PM: CPT | Performed by: INTERNAL MEDICINE

## 2020-07-20 PROCEDURE — 93296 REM INTERROG EVL PM/IDS: CPT | Performed by: INTERNAL MEDICINE

## 2020-07-26 ENCOUNTER — ANCILLARY PROCEDURE (OUTPATIENT)
Dept: CARDIOLOGY | Age: 65
End: 2020-07-26
Attending: INTERNAL MEDICINE

## 2020-07-26 ENCOUNTER — TELEPHONE (OUTPATIENT)
Dept: CARDIOLOGY | Age: 65
End: 2020-07-26

## 2020-07-26 DIAGNOSIS — Z95.0 CARDIAC PACEMAKER: ICD-10-CM

## 2020-08-24 RX ORDER — AMLODIPINE BESYLATE 5 MG/1
5 TABLET ORAL DAILY
Qty: 90 TABLET | Refills: 0 | Status: SHIPPED | OUTPATIENT
Start: 2020-08-24 | End: 2020-08-28

## 2020-08-24 NOTE — TELEPHONE ENCOUNTER
S/W patient and related DR. Avila's message that patient is due for office visit f/u HTN.  Patient scheduled appointment for 8/28/20, at 8:30 am.

## 2020-08-28 ENCOUNTER — OFFICE VISIT (OUTPATIENT)
Dept: INTERNAL MEDICINE CLINIC | Facility: CLINIC | Age: 65
End: 2020-08-28
Payer: COMMERCIAL

## 2020-08-28 ENCOUNTER — LAB ENCOUNTER (OUTPATIENT)
Dept: LAB | Age: 65
End: 2020-08-28
Attending: INTERNAL MEDICINE
Payer: COMMERCIAL

## 2020-08-28 VITALS
RESPIRATION RATE: 12 BRPM | HEART RATE: 67 BPM | TEMPERATURE: 97 F | BODY MASS INDEX: 29.55 KG/M2 | SYSTOLIC BLOOD PRESSURE: 126 MMHG | WEIGHT: 195 LBS | DIASTOLIC BLOOD PRESSURE: 80 MMHG | HEIGHT: 68 IN

## 2020-08-28 DIAGNOSIS — R73.03 PREDIABETES: ICD-10-CM

## 2020-08-28 DIAGNOSIS — E78.5 DYSLIPIDEMIA: ICD-10-CM

## 2020-08-28 DIAGNOSIS — Z87.39 HISTORY OF GOUT: ICD-10-CM

## 2020-08-28 DIAGNOSIS — R42 VERTIGO: ICD-10-CM

## 2020-08-28 DIAGNOSIS — I10 ESSENTIAL HYPERTENSION: Primary | ICD-10-CM

## 2020-08-28 DIAGNOSIS — E04.2 MULTIPLE THYROID NODULES: ICD-10-CM

## 2020-08-28 DIAGNOSIS — Z95.0 HISTORY OF PERMANENT CARDIAC PACEMAKER PLACEMENT: ICD-10-CM

## 2020-08-28 DIAGNOSIS — I10 ESSENTIAL HYPERTENSION: ICD-10-CM

## 2020-08-28 LAB
ALBUMIN SERPL-MCNC: 3.7 G/DL (ref 3.4–5)
ALBUMIN/GLOB SERPL: 1.1 {RATIO} (ref 1–2)
ALP LIVER SERPL-CCNC: 92 U/L (ref 45–117)
ALT SERPL-CCNC: 37 U/L (ref 16–61)
ALT SERPL-CCNC: 37 UNITS/L
ANION GAP SERPL CALC-SCNC: 5 MMOL/L (ref 0–18)
AST SERPL-CCNC: 23 U/L (ref 15–37)
AST SERPL-CCNC: 23 UNITS/L
BILIRUB SERPL-MCNC: 0.5 MG/DL (ref 0.1–2)
BUN BLD-MCNC: 11 MG/DL (ref 7–18)
BUN SERPL-MCNC: 11 MG/DL
BUN/CREAT SERPL: 10.1
BUN/CREAT SERPL: 10.1 (ref 10–20)
CALCIUM BLD-MCNC: 8.5 MG/DL (ref 8.5–10.1)
CALCIUM SERPL-MCNC: 8.5 MG/DL
CHLORIDE SERPL-SCNC: 106 MMOL/L
CHLORIDE SERPL-SCNC: 106 MMOL/L (ref 98–112)
CHOLEST SERPL-MCNC: 78 MG/DL
CHOLEST SMN-MCNC: 78 MG/DL (ref ?–200)
CHOLEST/HDLC SERPL: 20 {RATIO}
CO2 SERPL-SCNC: 28 MMOL/L
CO2 SERPL-SCNC: 28 MMOL/L (ref 21–32)
CREAT BLD-MCNC: 1.09 MG/DL (ref 0.7–1.3)
CREAT SERPL-MCNC: 1.09 MG/DL
EST. AVERAGE GLUCOSE BLD GHB EST-MCNC: 137 MG/DL (ref 68–126)
GLOBULIN PLAS-MCNC: 3.5 G/DL (ref 2.8–4.4)
GLUCOSE BLD-MCNC: 102 MG/DL (ref 70–99)
GLUCOSE SERPL-MCNC: 102 MG/DL
HBA1C MFR BLD HPLC: 6.4 % (ref ?–5.7)
HDLC SERPL-MCNC: 20 MG/DL (ref 40–59)
LDLC SERPL CALC-MCNC: 49 MG/DL (ref ?–100)
M PROTEIN MFR SERPL ELPH: 7.2 G/DL (ref 6.4–8.2)
NONHDLC SERPL-MCNC: 58 MG/DL (ref ?–130)
OSMOLALITY SERPL CALC.SUM OF ELEC: 288 MOSM/KG (ref 275–295)
PATIENT FASTING Y/N/NP: YES
PATIENT FASTING Y/N/NP: YES
POTASSIUM SERPL-SCNC: 3.6 MMOL/L
POTASSIUM SERPL-SCNC: 3.6 MMOL/L (ref 3.5–5.1)
SODIUM SERPL-SCNC: 139 MMOL/L
SODIUM SERPL-SCNC: 139 MMOL/L (ref 136–145)
TRIGL SERPL-MCNC: 47 MG/DL
TRIGL SERPL-MCNC: 47 MG/DL (ref 30–149)
URATE SERPL-MCNC: 8.5 MG/DL (ref 3.5–7.2)
VLDLC SERPL CALC-MCNC: 9 MG/DL
VLDLC SERPL CALC-MCNC: 9 MG/DL (ref 0–30)

## 2020-08-28 PROCEDURE — 99215 OFFICE O/P EST HI 40 MIN: CPT | Performed by: INTERNAL MEDICINE

## 2020-08-28 PROCEDURE — 3008F BODY MASS INDEX DOCD: CPT | Performed by: INTERNAL MEDICINE

## 2020-08-28 PROCEDURE — 80053 COMPREHEN METABOLIC PANEL: CPT

## 2020-08-28 PROCEDURE — 84550 ASSAY OF BLOOD/URIC ACID: CPT

## 2020-08-28 PROCEDURE — 36415 COLL VENOUS BLD VENIPUNCTURE: CPT

## 2020-08-28 PROCEDURE — 3079F DIAST BP 80-89 MM HG: CPT | Performed by: INTERNAL MEDICINE

## 2020-08-28 PROCEDURE — 83036 HEMOGLOBIN GLYCOSYLATED A1C: CPT

## 2020-08-28 PROCEDURE — 3074F SYST BP LT 130 MM HG: CPT | Performed by: INTERNAL MEDICINE

## 2020-08-28 PROCEDURE — 90471 IMMUNIZATION ADMIN: CPT | Performed by: INTERNAL MEDICINE

## 2020-08-28 PROCEDURE — 80061 LIPID PANEL: CPT

## 2020-08-28 PROCEDURE — 90732 PPSV23 VACC 2 YRS+ SUBQ/IM: CPT | Performed by: INTERNAL MEDICINE

## 2020-08-28 RX ORDER — AMLODIPINE BESYLATE 5 MG/1
5 TABLET ORAL DAILY
Qty: 90 TABLET | Refills: 1 | Status: SHIPPED | OUTPATIENT
Start: 2020-08-28 | End: 2021-08-31

## 2020-08-28 NOTE — PROGRESS NOTES
HPI:    Patient ID: Nelly Lara is a 75year old male. Hypertension   This is a chronic problem. The current episode started more than 1 year ago. The problem has been gradually improving since onset. The problem is controlled.  Pertinent negatives inc Current Outpatient Medications   Medication Sig Dispense Refill   • amLODIPine Besylate 5 MG Oral Tab Take 1 tablet (5 mg total) by mouth daily.  90 tablet 0   • Glucose Blood In Vitro Strip Check blood sugar once daily 100 strip 3   • Blood Glucose Monit and normal reflexes. He displays normal reflexes. No cranial nerve deficit or sensory deficit. He exhibits normal muscle tone. He displays a negative Romberg sign.  Coordination and gait normal.   Reflex Scores:       Tricep reflexes are 2+ on the right ketan cardiologist annually. Pt advised to get flushot this fall. Pneumovax 23 given today.          Orders Placed This Encounter      Comp Metabolic Panel (14) [E]      Hemoglobin A1C [E]      Uric Acid, Serum [E]      Lipid Panel [E]      PNEUMOCOCCAL IMM,

## 2020-10-26 PROCEDURE — 93296 REM INTERROG EVL PM/IDS: CPT | Performed by: INTERNAL MEDICINE

## 2020-12-04 ENCOUNTER — APPOINTMENT (OUTPATIENT)
Dept: CARDIOLOGY | Age: 65
End: 2020-12-04

## 2020-12-04 ENCOUNTER — ANCILLARY PROCEDURE (OUTPATIENT)
Dept: CARDIOLOGY | Age: 65
End: 2020-12-04
Attending: INTERNAL MEDICINE

## 2020-12-04 ENCOUNTER — TELEPHONE (OUTPATIENT)
Dept: CARDIOLOGY | Age: 65
End: 2020-12-04

## 2020-12-04 ENCOUNTER — OFFICE VISIT (OUTPATIENT)
Dept: CARDIOLOGY | Age: 65
End: 2020-12-04

## 2020-12-04 VITALS
HEIGHT: 68 IN | OXYGEN SATURATION: 95 % | SYSTOLIC BLOOD PRESSURE: 124 MMHG | WEIGHT: 191 LBS | BODY MASS INDEX: 28.95 KG/M2 | HEART RATE: 80 BPM | DIASTOLIC BLOOD PRESSURE: 76 MMHG

## 2020-12-04 DIAGNOSIS — Z95.0 PACEMAKER: ICD-10-CM

## 2020-12-04 DIAGNOSIS — Z45.018 FITTING AND ADJUSTMENT OF CARDIAC PACEMAKER: ICD-10-CM

## 2020-12-04 DIAGNOSIS — I10 ESSENTIAL HYPERTENSION: Primary | ICD-10-CM

## 2020-12-04 PROCEDURE — 99214 OFFICE O/P EST MOD 30 MIN: CPT | Performed by: INTERNAL MEDICINE

## 2020-12-04 PROCEDURE — 93280 PM DEVICE PROGR EVAL DUAL: CPT | Performed by: INTERNAL MEDICINE

## 2020-12-04 PROCEDURE — 3074F SYST BP LT 130 MM HG: CPT | Performed by: INTERNAL MEDICINE

## 2020-12-04 PROCEDURE — 3078F DIAST BP <80 MM HG: CPT | Performed by: INTERNAL MEDICINE

## 2020-12-04 ASSESSMENT — PATIENT HEALTH QUESTIONNAIRE - PHQ9
1. LITTLE INTEREST OR PLEASURE IN DOING THINGS: NOT AT ALL
SUM OF ALL RESPONSES TO PHQ9 QUESTIONS 1 AND 2: 0
CLINICAL INTERPRETATION OF PHQ2 SCORE: NO FURTHER SCREENING NEEDED
CLINICAL INTERPRETATION OF PHQ2 SCORE: NO FURTHER SCREENING NEEDED
2. FEELING DOWN, DEPRESSED OR HOPELESS: NOT AT ALL
2. FEELING DOWN, DEPRESSED OR HOPELESS: NOT AT ALL
SUM OF ALL RESPONSES TO PHQ9 QUESTIONS 1 AND 2: 0
CLINICAL INTERPRETATION OF PHQ9 SCORE: NO FURTHER SCREENING NEEDED
1. LITTLE INTEREST OR PLEASURE IN DOING THINGS: NOT AT ALL
SUM OF ALL RESPONSES TO PHQ9 QUESTIONS 1 AND 2: 0

## 2021-02-01 PROCEDURE — 93294 REM INTERROG EVL PM/LDLS PM: CPT | Performed by: INTERNAL MEDICINE

## 2021-02-01 PROCEDURE — 93296 REM INTERROG EVL PM/IDS: CPT | Performed by: INTERNAL MEDICINE

## 2021-02-03 ENCOUNTER — ANCILLARY PROCEDURE (OUTPATIENT)
Dept: CARDIOLOGY | Age: 66
End: 2021-02-03
Attending: INTERNAL MEDICINE

## 2021-02-03 ENCOUNTER — ANCILLARY ORDERS (OUTPATIENT)
Dept: CARDIOLOGY | Age: 66
End: 2021-02-03

## 2021-02-03 DIAGNOSIS — Z45.018 ENCOUNTER FOR CARE OF PACEMAKER: ICD-10-CM

## 2021-02-03 PROCEDURE — X1114 CARDIAC DEVICE HOME CHECK - REMOTE UNSCHEDULED: HCPCS | Performed by: INTERNAL MEDICINE

## 2021-03-09 DIAGNOSIS — Z23 NEED FOR VACCINATION: ICD-10-CM

## 2021-03-15 DIAGNOSIS — Z23 NEED FOR VACCINATION: ICD-10-CM

## 2021-05-10 ENCOUNTER — ANCILLARY PROCEDURE (OUTPATIENT)
Dept: CARDIOLOGY | Age: 66
End: 2021-05-10
Attending: INTERNAL MEDICINE

## 2021-05-10 ENCOUNTER — ANCILLARY ORDERS (OUTPATIENT)
Dept: CARDIOLOGY | Age: 66
End: 2021-05-10

## 2021-05-10 DIAGNOSIS — Z95.0 CARDIAC PACEMAKER IN SITU: ICD-10-CM

## 2021-05-10 PROCEDURE — X1114 CARDIAC DEVICE HOME CHECK - REMOTE UNSCHEDULED: HCPCS | Performed by: INTERNAL MEDICINE

## 2021-05-10 PROCEDURE — 93296 REM INTERROG EVL PM/IDS: CPT | Performed by: INTERNAL MEDICINE

## 2021-05-10 PROCEDURE — 93294 REM INTERROG EVL PM/LDLS PM: CPT | Performed by: INTERNAL MEDICINE

## 2021-08-30 ENCOUNTER — TELEMEDICINE (OUTPATIENT)
Dept: INTERNAL MEDICINE CLINIC | Facility: CLINIC | Age: 66
End: 2021-08-30

## 2021-08-30 ENCOUNTER — NURSE TRIAGE (OUTPATIENT)
Dept: INTERNAL MEDICINE CLINIC | Facility: CLINIC | Age: 66
End: 2021-08-30

## 2021-08-30 DIAGNOSIS — M10.9 ACUTE GOUT OF LEFT FOOT, UNSPECIFIED CAUSE: ICD-10-CM

## 2021-08-30 DIAGNOSIS — J45.21 MILD INTERMITTENT ASTHMA WITH ACUTE EXACERBATION: Primary | ICD-10-CM

## 2021-08-30 PROCEDURE — 99214 OFFICE O/P EST MOD 30 MIN: CPT | Performed by: INTERNAL MEDICINE

## 2021-08-30 RX ORDER — ALBUTEROL SULFATE 90 UG/1
2 AEROSOL, METERED RESPIRATORY (INHALATION) EVERY 6 HOURS PRN
Qty: 3 EACH | Refills: 3 | Status: SHIPPED | OUTPATIENT
Start: 2021-08-30

## 2021-08-30 RX ORDER — PREDNISONE 10 MG/1
TABLET ORAL
Qty: 19 TABLET | Refills: 0 | Status: SHIPPED | OUTPATIENT
Start: 2021-08-30 | End: 2021-09-10

## 2021-08-30 NOTE — TELEPHONE ENCOUNTER
Action Requested: Summary for Provider     []  Critical Lab, Recommendations Needed  [] Need Additional Advice  [x]   FYI    []   Need Orders  [] Need Medications Sent to Pharmacy  []  Other     SUMMARY:   Spoke with pt,  verified, pt is looking for an

## 2021-08-30 NOTE — PROGRESS NOTES
Telehealth Visit      I spoke with Lashanda Livingston by secure Epic/Vaddio video service on 08/30/21, verified date of birth, patient stated [place], and discussed their concerns as below:       Elidia Sal RN     ES    8/30 Associated symptoms include coughing and joint swelling. The symptoms are aggravated by standing and walking. He has tried nothing for the symptoms. The treatment provided no relief.          Objective    Atarax [Hydroxyzine]    FATIGUE  Shrimp Medications:  Current Outpatient Medications   Medication Sig Dispense Refill   • Albuterol Sulfate  (90 Base) MCG/ACT Inhalation Aero Soln Inhale 2 puffs into the lungs every 6 (six) hours as needed for Wheezing or Shortness of Breath (cough).  3 ea tablet; Refill: 0  Plan  Suspect his cough is due to asthma especially since it is worse at night and was preceded by allergy symptoms, we will start him on inhalers as above along with prednisone.   Left foot appears to be gout, the prednisone will also he -19 public health crisis/national emergency where restrictions of face-to-face office visits are ongoing. Every conscious effort was taken to allow for sufficient and adequate time to complete the visit. The patient was made aware of the limitations of the

## 2021-08-31 RX ORDER — AMLODIPINE BESYLATE 5 MG/1
TABLET ORAL
Qty: 90 TABLET | Refills: 0 | Status: SHIPPED | OUTPATIENT
Start: 2021-08-31 | End: 2021-11-10

## 2021-09-01 NOTE — TELEPHONE ENCOUNTER
Left message for pt call back to inform of Dr Gabriela Berrios recommendations. Please assist pt in scheduling a follow up office visit.

## 2021-09-01 NOTE — TELEPHONE ENCOUNTER
Patient returned call and was advised of 's message below. Patient was then transferred to phone agent to assist with making appointment.

## 2021-09-02 ENCOUNTER — LAB ENCOUNTER (OUTPATIENT)
Dept: LAB | Age: 66
End: 2021-09-02
Attending: INTERNAL MEDICINE
Payer: COMMERCIAL

## 2021-09-02 ENCOUNTER — OFFICE VISIT (OUTPATIENT)
Dept: INTERNAL MEDICINE CLINIC | Facility: CLINIC | Age: 66
End: 2021-09-02
Payer: COMMERCIAL

## 2021-09-02 VITALS
BODY MASS INDEX: 29.5 KG/M2 | HEART RATE: 64 BPM | TEMPERATURE: 98 F | DIASTOLIC BLOOD PRESSURE: 82 MMHG | WEIGHT: 194.63 LBS | SYSTOLIC BLOOD PRESSURE: 133 MMHG | HEIGHT: 68 IN

## 2021-09-02 DIAGNOSIS — M1A.9XX0 CHRONIC GOUT WITHOUT TOPHUS, UNSPECIFIED CAUSE, UNSPECIFIED SITE: ICD-10-CM

## 2021-09-02 DIAGNOSIS — Z13.6 SCREENING, ISCHEMIC HEART DISEASE: ICD-10-CM

## 2021-09-02 DIAGNOSIS — Z23 IMMUNIZATION DUE: ICD-10-CM

## 2021-09-02 DIAGNOSIS — Z00.00 ANNUAL PHYSICAL EXAM: ICD-10-CM

## 2021-09-02 DIAGNOSIS — Z00.00 ANNUAL PHYSICAL EXAM: Primary | ICD-10-CM

## 2021-09-02 DIAGNOSIS — Z23 NEED FOR SHINGLES VACCINE: ICD-10-CM

## 2021-09-02 LAB
ALBUMIN SERPL-MCNC: 4 G/DL (ref 3.4–5)
ALBUMIN/GLOB SERPL: 1.3 {RATIO} (ref 1–2)
ALP LIVER SERPL-CCNC: 75 U/L
ALT SERPL-CCNC: 34 U/L
ANION GAP SERPL CALC-SCNC: 9 MMOL/L (ref 0–18)
AST SERPL-CCNC: 15 U/L (ref 15–37)
BILIRUB SERPL-MCNC: 0.3 MG/DL (ref 0.1–2)
BUN BLD-MCNC: 10 MG/DL (ref 7–18)
BUN/CREAT SERPL: 9.9 (ref 10–20)
CALCIUM BLD-MCNC: 8.9 MG/DL (ref 8.5–10.1)
CHLORIDE SERPL-SCNC: 105 MMOL/L (ref 98–112)
CHOLEST SMN-MCNC: 101 MG/DL (ref ?–200)
CO2 SERPL-SCNC: 26 MMOL/L (ref 21–32)
COMPLEXED PSA SERPL-MCNC: 2.68 NG/ML (ref ?–4)
CREAT BLD-MCNC: 1.01 MG/DL
DEPRECATED RDW RBC AUTO: 39.9 FL (ref 35.1–46.3)
ERYTHROCYTE [DISTWIDTH] IN BLOOD BY AUTOMATED COUNT: 12.6 % (ref 11–15)
EST. AVERAGE GLUCOSE BLD GHB EST-MCNC: 140 MG/DL (ref 68–126)
GLOBULIN PLAS-MCNC: 3.2 G/DL (ref 2.8–4.4)
GLUCOSE BLD-MCNC: 92 MG/DL (ref 70–99)
HBA1C MFR BLD HPLC: 6.5 % (ref ?–5.7)
HCT VFR BLD AUTO: 44.2 %
HDLC SERPL-MCNC: 23 MG/DL (ref 40–59)
HGB BLD-MCNC: 14.8 G/DL
LDLC SERPL CALC-MCNC: 67 MG/DL (ref ?–100)
M PROTEIN MFR SERPL ELPH: 7.2 G/DL (ref 6.4–8.2)
MCH RBC QN AUTO: 29.2 PG (ref 26–34)
MCHC RBC AUTO-ENTMCNC: 33.5 G/DL (ref 31–37)
MCV RBC AUTO: 87.2 FL
NONHDLC SERPL-MCNC: 78 MG/DL (ref ?–130)
OSMOLALITY SERPL CALC.SUM OF ELEC: 289 MOSM/KG (ref 275–295)
PATIENT FASTING Y/N/NP: YES
PATIENT FASTING Y/N/NP: YES
PLATELET # BLD AUTO: 254 10(3)UL (ref 150–450)
POTASSIUM SERPL-SCNC: 3.4 MMOL/L (ref 3.5–5.1)
RBC # BLD AUTO: 5.07 X10(6)UL
SODIUM SERPL-SCNC: 140 MMOL/L (ref 136–145)
TRIGL SERPL-MCNC: 43 MG/DL (ref 30–149)
TSI SER-ACNC: 0.54 MIU/ML (ref 0.36–3.74)
URATE SERPL-MCNC: 7 MG/DL
VLDLC SERPL CALC-MCNC: 6 MG/DL (ref 0–30)
WBC # BLD AUTO: 9.6 X10(3) UL (ref 4–11)

## 2021-09-02 PROCEDURE — 80061 LIPID PANEL: CPT

## 2021-09-02 PROCEDURE — 85027 COMPLETE CBC AUTOMATED: CPT

## 2021-09-02 PROCEDURE — 3079F DIAST BP 80-89 MM HG: CPT | Performed by: INTERNAL MEDICINE

## 2021-09-02 PROCEDURE — 83036 HEMOGLOBIN GLYCOSYLATED A1C: CPT

## 2021-09-02 PROCEDURE — 99397 PER PM REEVAL EST PAT 65+ YR: CPT | Performed by: INTERNAL MEDICINE

## 2021-09-02 PROCEDURE — 3008F BODY MASS INDEX DOCD: CPT | Performed by: INTERNAL MEDICINE

## 2021-09-02 PROCEDURE — 90750 HZV VACC RECOMBINANT IM: CPT | Performed by: INTERNAL MEDICINE

## 2021-09-02 PROCEDURE — 84550 ASSAY OF BLOOD/URIC ACID: CPT

## 2021-09-02 PROCEDURE — 90472 IMMUNIZATION ADMIN EACH ADD: CPT | Performed by: INTERNAL MEDICINE

## 2021-09-02 PROCEDURE — 36415 COLL VENOUS BLD VENIPUNCTURE: CPT

## 2021-09-02 PROCEDURE — 3075F SYST BP GE 130 - 139MM HG: CPT | Performed by: INTERNAL MEDICINE

## 2021-09-02 PROCEDURE — 80053 COMPREHEN METABOLIC PANEL: CPT

## 2021-09-02 PROCEDURE — 84443 ASSAY THYROID STIM HORMONE: CPT

## 2021-09-02 PROCEDURE — 90715 TDAP VACCINE 7 YRS/> IM: CPT | Performed by: INTERNAL MEDICINE

## 2021-09-02 PROCEDURE — 90471 IMMUNIZATION ADMIN: CPT | Performed by: INTERNAL MEDICINE

## 2021-09-02 RX ORDER — NAPROXEN 500 MG/1
500 TABLET ORAL 2 TIMES DAILY WITH MEALS
Qty: 30 TABLET | Refills: 3 | Status: SHIPPED | OUTPATIENT
Start: 2021-09-02 | End: 2021-09-02

## 2021-09-02 RX ORDER — METHYLPREDNISOLONE 4 MG/1
TABLET ORAL
Qty: 1 EACH | Refills: 0 | Status: SHIPPED | OUTPATIENT
Start: 2021-09-02 | End: 2021-09-02

## 2021-09-02 RX ORDER — NAPROXEN 500 MG/1
500 TABLET ORAL 2 TIMES DAILY WITH MEALS
Qty: 30 TABLET | Refills: 3 | Status: SHIPPED | OUTPATIENT
Start: 2021-09-02

## 2021-09-02 RX ORDER — METHYLPREDNISOLONE 4 MG/1
TABLET ORAL
Qty: 1 EACH | Refills: 2 | Status: SHIPPED | OUTPATIENT
Start: 2021-09-02 | End: 2021-11-10

## 2021-09-02 NOTE — PATIENT INSTRUCTIONS
1. Please purchase a blood pressure monitor, if you don't already own one, and record your blood pressure and heart rate 1-2 times daily on the provided log.  The more values you provide at the end of the month the easier it will be to adjust your medic hard against artery walls. This causes damage to the artery walls and then the formation of scar tissue as it heals. This makes the arteries stiff and weak. Plaque sticks to the scarred tissue narrowing and hardening the arteries.  High blood pressure also artery walls between heartbeats (diastolic). Talk with your healthcare provider to find out what your blood pressure goals should be. Controlling blood pressure  If your blood pressure is too high, work with your doctor on a plan for lowering it.  Below a pressure  · Feeling OK does not mean that blood pressure is under control. Likewise, feeling bad doesn’t mean it’s out of control. The only way to know for sure is to check your pressure regularly.   · Medicine is only one part of controlling high blood pre diastolic of less than 80 (120/80)  · Elevated blood pressure is systolic of 710 to 306 and diastolic less than 80  · Stage 1 high blood pressure is systolic is 492 to 034 or diastolic between 80 to 89  · Stage 2 high blood pressure is when systolic is 294 beverages. · Stop smoking. If you are a long-time smoker, this can be hard. Enroll in a stop-smoking program to make it more likely that you will succeed. Talk with your provider about ways to quit. · Learn how to handle stress better.  This is an importa simply take the monitor with you to your next appointment. · Call your provider if you have several high readings. Don't be frightened by a single high reading, but if you get several high readings, check in with your healthcare provider.   · Note: When bl relaxes between beats.   Blood pressure is categorized as normal, elevated, or stage 1 or stage 2 high blood pressure:  · Normal blood pressure is systolic of less than 191 and diastolic of less than 80 (120/80)  · Elevated blood pressure is systolic of 700 Be active at a moderate to vigorous level of physical activity for at least 40 minutes for a minimum of 3 to 4 days a week. Manage stress  · Make time to relax and enjoy life. Find time to laugh.   · Communicate your concerns with your loved ones and yo with no added salt  Stay away from:  · Lunch or deli meat that is cured or smoked  · Cheese  · Tomato juice and ketchup  · Canned vegetables, soups, and fish not labeled as no-salt-added or reduced sodium  · Packaged gravies and sauces  · Olives, pickles,

## 2021-09-02 NOTE — PROGRESS NOTES
History of Present Illness   Patient ID: Kathy Eliozndo is a 77year old male. Chief Complaint: Physical      Kathy Elizondo is a pleasant 77year old male who presents for annual physical exam. Kathy Elizondo is doing well today. Gout- somewhat recurrent. flat. Bowel sounds are normal.      Tenderness: There is no abdominal tenderness. Musculoskeletal:         General: Normal range of motion. Cervical back: Normal range of motion and neck supple. Skin:     Coloration: Skin is not jaundiced.    Neuro OTHER (SEE COMMENTS)    Comment:Gout flare.      Reviewed:  Patient Active Problem List    Vertigo      Multiple thyroid nodules      Dyslipidemia      History of permanent cardiac pacemaker placement      Pneumonia of right lower lobe due t 0   • AMLODIPINE 5 MG Oral Tab TAKE 1 TABLET BY MOUTH EVERY DAY 90 tablet 0   • Albuterol Sulfate  (90 Base) MCG/ACT Inhalation Aero Soln Inhale 2 puffs into the lungs every 6 (six) hours as needed for Wheezing or Shortness of Breath (cough).  3 each for DEPENDING ON LAB/IMAGE RESULTS.         Pina Graves MD  Internal Medicine    Medications This Visit:  Requested Prescriptions     Signed Prescriptions Disp Refills   • naproxen 500 MG Oral Tab 30 tablet 3     Sig: Take 1 tablet (500 mg total) by mouth 120-130 mmHg / 70-80 mmHg. Blood pressure that stays consistently within this range reduces your risk of stroke, heart attack, heart disease, kidney disease, eye disease, and sexual dysfunction. What is High Blood Pressure?   High blood pressure (als numbers. Systolic blood pressure is the upper number. This is the pressure when the heart contracts. Diastolic blood pressure is the lower number. This is the pressure when the heart relaxes between beats.   Blood pressure is categorized as normal, elevated diet reduces fluid retention. Fluid retention caused by too much salt increases blood volume and blood pressure. The American Heart Association’s (AHA) \"ideal\" sodium intake recommendation is 1,500 milligrams per day.   However, since American's eat so mu doses of your blood pressure medicine. Or it can happen if you are taking other medicines. These include some asthma inhalers, decongestants, diet pills, and street drugs like cocaine and amphetamine.   Other causes include:  · Weight gain  · More salt in y medicines in the future. · Start a weight-loss program if you are overweight. · Cut back on the amount of salt in your diet:  ? Avoid high-salt foods like olives, pickles, smoked meats, and salted potato chips.   ? Don’t add salt to your food at the table afternoon. Keep a written record of your home blood pressure readings and take the record to your medical appointments. Here are some additional guidelines on home blood pressure monitoring from the American Heart Association.   · Don't smoke or drink coff number) of 180 or higher or diastolic (bottom number) of 110 or higher, emergency medical treatment is required.   · Chest, arm, shoulder, neck, or upper back pain  · Shortness of breath  · Severe headache  · Throbbing or rushing sound in the ears  · Nosebl day.  · Choose lean meats, fish, or chicken. · Eat whole-grain pasta, brown rice, and beans. · Eat 2 to 3 servings of low-fat or fat-free dairy products. · Ask your doctor about the DASH eating plan. This plan helps reduce blood pressure.   · When you go prescribed. If you have any questions about your medicines, ask your healthcare provider before stopping or changing them. Low-Salt Choices  Eating salt (sodium) can make your body retain too much water. Excess water makes your heart work harder.  Canned,

## 2021-10-19 ENCOUNTER — HOSPITAL ENCOUNTER (OUTPATIENT)
Dept: CT IMAGING | Facility: HOSPITAL | Age: 66
Discharge: HOME | End: 2021-10-19
Attending: INTERNAL MEDICINE

## 2021-10-19 DIAGNOSIS — Z13.6 SCREENING, ISCHEMIC HEART DISEASE: ICD-10-CM

## 2021-10-26 ENCOUNTER — TELEPHONE (OUTPATIENT)
Dept: INTERNAL MEDICINE CLINIC | Facility: CLINIC | Age: 66
End: 2021-10-26

## 2021-10-26 NOTE — TELEPHONE ENCOUNTER
Dr. Mai ESIPNAL    Patient returning phone call to discuss CT calcium scoring. Spoke with patient and provided results and advice. Patient verbalized understanding and denies questions/concerns. Patient states he will ask his cardiologist about statin medication, states he is not on any cholesterol medication at this time.

## 2021-11-10 ENCOUNTER — OFFICE VISIT (OUTPATIENT)
Dept: INTERNAL MEDICINE CLINIC | Facility: CLINIC | Age: 66
End: 2021-11-10
Payer: COMMERCIAL

## 2021-11-10 VITALS
DIASTOLIC BLOOD PRESSURE: 82 MMHG | HEIGHT: 68 IN | BODY MASS INDEX: 30.01 KG/M2 | HEART RATE: 82 BPM | SYSTOLIC BLOOD PRESSURE: 143 MMHG | WEIGHT: 198 LBS

## 2021-11-10 DIAGNOSIS — E78.2 MIXED HYPERLIPIDEMIA: ICD-10-CM

## 2021-11-10 DIAGNOSIS — Z95.0 HISTORY OF PERMANENT CARDIAC PACEMAKER PLACEMENT: ICD-10-CM

## 2021-11-10 DIAGNOSIS — Z23 IMMUNIZATION DUE: ICD-10-CM

## 2021-11-10 DIAGNOSIS — I10 HYPERTENSION GOAL BP (BLOOD PRESSURE) < 130/80: Primary | ICD-10-CM

## 2021-11-10 PROCEDURE — 99214 OFFICE O/P EST MOD 30 MIN: CPT | Performed by: INTERNAL MEDICINE

## 2021-11-10 PROCEDURE — 90472 IMMUNIZATION ADMIN EACH ADD: CPT | Performed by: INTERNAL MEDICINE

## 2021-11-10 PROCEDURE — 90471 IMMUNIZATION ADMIN: CPT | Performed by: INTERNAL MEDICINE

## 2021-11-10 PROCEDURE — 3008F BODY MASS INDEX DOCD: CPT | Performed by: INTERNAL MEDICINE

## 2021-11-10 PROCEDURE — 90662 IIV NO PRSV INCREASED AG IM: CPT | Performed by: INTERNAL MEDICINE

## 2021-11-10 PROCEDURE — 3079F DIAST BP 80-89 MM HG: CPT | Performed by: INTERNAL MEDICINE

## 2021-11-10 PROCEDURE — 90750 HZV VACC RECOMBINANT IM: CPT | Performed by: INTERNAL MEDICINE

## 2021-11-10 PROCEDURE — 3077F SYST BP >= 140 MM HG: CPT | Performed by: INTERNAL MEDICINE

## 2021-11-10 RX ORDER — AMLODIPINE BESYLATE 5 MG/1
5 TABLET ORAL NIGHTLY
Qty: 90 TABLET | Refills: 3 | Status: SHIPPED | OUTPATIENT
Start: 2021-11-10

## 2021-11-10 RX ORDER — TELMISARTAN 40 MG/1
40 TABLET ORAL NIGHTLY
Qty: 90 TABLET | Refills: 3 | Status: SHIPPED | OUTPATIENT
Start: 2021-11-10

## 2021-11-10 NOTE — PATIENT INSTRUCTIONS
Crestor Oral Tablet 5 mg  Uses  To lower high fat levels in blood. Instructions  Swallow the medicine without crushing or chewing it. This medicine may be taken with or without food. Keep the medicine at room temperature. Avoid heat and direct light. medicines you take. Do not start or stop any other medicines without first speaking to your doctor or pharmacist.  Do not share this medicine with anyone who has not been prescribed this medicine.   Side Effects  Call your doctor or get medical help right

## 2021-11-10 NOTE — PROGRESS NOTES
History of Present Illness   Patient ID: Jefry Banda is a 77year old male. Today's Date: 11/10/21  Chief Complaint: Hypertension      Hypertension  This is a chronic problem. The current episode started more than 1 month ago.  Pertinent negatives incl Pulmonary:      Effort: Pulmonary effort is normal.   Musculoskeletal:         General: Normal range of motion. Cervical back: Normal range of motion and neck supple. Skin:     Coloration: Skin is not jaundiced.    Neurological:      General: No fo Tristan Rivas 82  Plan  As above    3.  Mixed hyperlipidemia  Plan  There is evidence of heart disease on his calcium score of 90, his LDL is 67, we discussed starting rosuvastatin 5 mg for primary prevention, states he will think about it, recommend he follow-up with cathi blood.  Instructions  Swallow the medicine without crushing or chewing it. This medicine may be taken with or without food. Keep the medicine at room temperature. Avoid heat and direct light. Do not take this medicine with antacids.   It is important himanshu without first speaking to your doctor or pharmacist.  Do not share this medicine with anyone who has not been prescribed this medicine.   Side Effects  Call your doctor or get medical help right away if you notice any of these more serious side effects:  ·

## 2021-11-26 ENCOUNTER — HOSPITAL ENCOUNTER (OUTPATIENT)
Dept: ULTRASOUND IMAGING | Age: 66
Discharge: HOME | End: 2021-11-26
Attending: INTERNAL MEDICINE

## 2021-11-26 DIAGNOSIS — Z13.9 ENCOUNTER FOR SCREENING: ICD-10-CM

## 2022-06-21 LAB — AMB EXT COVID-19 RESULT: DETECTED

## 2022-06-22 ENCOUNTER — NURSE TRIAGE (OUTPATIENT)
Dept: INTERNAL MEDICINE CLINIC | Facility: CLINIC | Age: 67
End: 2022-06-22

## 2022-06-22 DIAGNOSIS — U07.1 COVID-19 VIRUS DETECTED: Primary | ICD-10-CM

## 2022-06-22 NOTE — TELEPHONE ENCOUNTER
Noted agreed, if looking for paxlovid will send but unsure of availability, or he can see another provider tomorrow to discuss.

## 2022-06-22 NOTE — TELEPHONE ENCOUNTER
Spoke with [facility] pharmacy--verifies Rx in stock and will get ready for  in about one hour. Spoke with patient ( verified) and relayed Dr. Ricardo Craig message below and pharmacy will get ready for --patient verbalizes understanding and agreement. No further questions/concerns at this time.

## 2022-06-23 ENCOUNTER — TELEPHONE (OUTPATIENT)
Dept: INTERNAL MEDICINE CLINIC | Facility: CLINIC | Age: 67
End: 2022-06-23

## 2022-06-23 NOTE — TELEPHONE ENCOUNTER
Spoke with pt,  verified. Pt was informed of  MD recommendation, pt stated understanding. Pt stated he tolerate meds paxlovid.

## 2022-06-23 NOTE — TELEPHONE ENCOUNTER
Dr. Cande Hernandez:  Patient wants to make sure if he should continue or stop paxlovid. Patient and daughter Yanely Muro on the line;   Started with Paxlovid yesterday evening; Today noticed, little diarrhea and has bad taste in the mouth this morning. Denied severe bouts of diarrhea. No rash, no sob. I Informed patient the symptoms described are common side effects. I will confirm with provider if patient can continue with medication.

## 2022-06-23 NOTE — TELEPHONE ENCOUNTER
Yes this are potential side effects of paxlovid; also covid can also cause diearrhea.    If he cant tolerate med go to IC or ER for monoclonal ab treatment

## 2022-07-20 ENCOUNTER — TELEPHONE (OUTPATIENT)
Dept: INTERNAL MEDICINE CLINIC | Facility: CLINIC | Age: 67
End: 2022-07-20

## 2022-07-20 NOTE — TELEPHONE ENCOUNTER
Lamont Siddiqi that currently patient does not take insulin and his primary insurance listed is [employer]. She verbalized understanding.

## 2022-07-20 NOTE — TELEPHONE ENCOUNTER
Naomie Floyd from [facility] calling to state patient requested Freestyle Francine glucose monitor, but needs to know if patient is on insulin injections and who the primary insurance is.

## 2022-09-26 ENCOUNTER — TELEPHONE (OUTPATIENT)
Dept: GASTROENTEROLOGY | Facility: CLINIC | Age: 67
End: 2022-09-26

## 2022-09-26 NOTE — TELEPHONE ENCOUNTER
----- Message from Mika Barton, [address] [place] [address] Dee Dee sent at 9/26/2022  8:06 AM CDT -----  Regarding: Recall Colon  Jose Angel Mueller CMA  [facility] Clinical Staff  Recall colon in 5 years per CB.  Colon done 11-25-17

## 2022-11-09 ENCOUNTER — OFFICE VISIT (OUTPATIENT)
Dept: INTERNAL MEDICINE CLINIC | Facility: CLINIC | Age: 67
End: 2022-11-09
Payer: COMMERCIAL

## 2022-11-09 ENCOUNTER — LAB ENCOUNTER (OUTPATIENT)
Dept: LAB | Age: 67
End: 2022-11-09
Attending: INTERNAL MEDICINE
Payer: COMMERCIAL

## 2022-11-09 ENCOUNTER — HOSPITAL ENCOUNTER (OUTPATIENT)
Dept: GENERAL RADIOLOGY | Age: 67
Discharge: HOME | End: 2022-11-09
Attending: INTERNAL MEDICINE
Payer: COMMERCIAL

## 2022-11-09 VITALS
SYSTOLIC BLOOD PRESSURE: 141 MMHG | HEIGHT: 68 IN | HEART RATE: 67 BPM | DIASTOLIC BLOOD PRESSURE: 78 MMHG | WEIGHT: 193 LBS | BODY MASS INDEX: 29.25 KG/M2

## 2022-11-09 DIAGNOSIS — J45.21 MILD INTERMITTENT ASTHMA WITH ACUTE EXACERBATION: ICD-10-CM

## 2022-11-09 DIAGNOSIS — E55.9 VITAMIN D DEFICIENCY: ICD-10-CM

## 2022-11-09 DIAGNOSIS — Z87.39 HISTORY OF GOUT: ICD-10-CM

## 2022-11-09 DIAGNOSIS — I44.7 LBBB (LEFT BUNDLE BRANCH BLOCK): ICD-10-CM

## 2022-11-09 DIAGNOSIS — E78.2 MIXED HYPERLIPIDEMIA: ICD-10-CM

## 2022-11-09 DIAGNOSIS — Z23 FLU VACCINE NEED: ICD-10-CM

## 2022-11-09 DIAGNOSIS — Z00.00 ANNUAL PHYSICAL EXAM: Primary | ICD-10-CM

## 2022-11-09 DIAGNOSIS — Z95.0 HISTORY OF PERMANENT CARDIAC PACEMAKER PLACEMENT: ICD-10-CM

## 2022-11-09 DIAGNOSIS — I10 HYPERTENSION GOAL BP (BLOOD PRESSURE) < 130/80: ICD-10-CM

## 2022-11-09 DIAGNOSIS — Z12.5 ENCOUNTER FOR SCREENING FOR MALIGNANT NEOPLASM OF PROSTATE: ICD-10-CM

## 2022-11-09 DIAGNOSIS — Z12.11 COLON CANCER SCREENING: ICD-10-CM

## 2022-11-09 DIAGNOSIS — R73.03 PREDIABETES: ICD-10-CM

## 2022-11-09 DIAGNOSIS — E04.2 MULTIPLE THYROID NODULES: ICD-10-CM

## 2022-11-09 PROBLEM — E78.5 DYSLIPIDEMIA: Status: RESOLVED | Noted: 2020-08-28 | Resolved: 2022-11-09

## 2022-11-09 PROBLEM — R42 VERTIGO: Status: RESOLVED | Noted: 2020-08-28 | Resolved: 2022-11-09

## 2022-11-09 PROBLEM — J18.9 PNEUMONIA OF RIGHT LOWER LOBE DUE TO INFECTIOUS ORGANISM: Status: RESOLVED | Noted: 2019-06-14 | Resolved: 2022-11-09

## 2022-11-09 LAB
ALBUMIN SERPL-MCNC: 4.2 G/DL (ref 3.4–5)
ALBUMIN/GLOB SERPL: 1.5 {RATIO} (ref 1–2)
ALP LIVER SERPL-CCNC: 89 U/L
ALT SERPL-CCNC: 35 U/L
ANION GAP SERPL CALC-SCNC: 6 MMOL/L (ref 0–18)
AST SERPL-CCNC: 20 U/L (ref 15–37)
BILIRUB SERPL-MCNC: 0.5 MG/DL (ref 0.1–2)
BUN BLD-MCNC: 11 MG/DL (ref 7–18)
BUN/CREAT SERPL: 9.6 (ref 10–20)
CALCIUM BLD-MCNC: 8.9 MG/DL (ref 8.5–10.1)
CHLORIDE SERPL-SCNC: 104 MMOL/L (ref 98–112)
CHOLEST SERPL-MCNC: 85 MG/DL (ref ?–200)
CO2 SERPL-SCNC: 29 MMOL/L (ref 21–32)
COMPLEXED PSA SERPL-MCNC: 3.32 NG/ML (ref ?–4)
CREAT BLD-MCNC: 1.14 MG/DL
DEPRECATED RDW RBC AUTO: 39.8 FL (ref 35.1–46.3)
ERYTHROCYTE [DISTWIDTH] IN BLOOD BY AUTOMATED COUNT: 12.7 % (ref 11–15)
EST. AVERAGE GLUCOSE BLD GHB EST-MCNC: 140 MG/DL (ref 68–126)
FASTING PATIENT LIPID ANSWER: YES
FASTING STATUS PATIENT QL REPORTED: YES
GFR SERPLBLD BASED ON 1.73 SQ M-ARVRAT: 70 ML/MIN/1.73M2 (ref 60–?)
GLOBULIN PLAS-MCNC: 2.8 G/DL (ref 2.8–4.4)
GLUCOSE BLD-MCNC: 115 MG/DL (ref 70–99)
HBA1C MFR BLD: 6.5 % (ref ?–5.7)
HCT VFR BLD AUTO: 45 %
HDLC SERPL-MCNC: 19 MG/DL (ref 40–59)
HGB BLD-MCNC: 15.5 G/DL
LDLC SERPL CALC-MCNC: 52 MG/DL (ref ?–100)
MCH RBC QN AUTO: 29.9 PG (ref 26–34)
MCHC RBC AUTO-ENTMCNC: 34.4 G/DL (ref 31–37)
MCV RBC AUTO: 86.7 FL
NONHDLC SERPL-MCNC: 66 MG/DL (ref ?–130)
OSMOLALITY SERPL CALC.SUM OF ELEC: 288 MOSM/KG (ref 275–295)
PLATELET # BLD AUTO: 220 10(3)UL (ref 150–450)
POTASSIUM SERPL-SCNC: 3.9 MMOL/L (ref 3.5–5.1)
PROT SERPL-MCNC: 7 G/DL (ref 6.4–8.2)
RBC # BLD AUTO: 5.19 X10(6)UL
SODIUM SERPL-SCNC: 139 MMOL/L (ref 136–145)
TRIGL SERPL-MCNC: 57 MG/DL (ref 30–149)
TSI SER-ACNC: 0.78 MIU/ML (ref 0.36–3.74)
URATE SERPL-MCNC: 8.6 MG/DL
VIT D+METAB SERPL-MCNC: 29.3 NG/ML (ref 30–100)
VLDLC SERPL CALC-MCNC: 8 MG/DL (ref 0–30)
WBC # BLD AUTO: 6.2 X10(3) UL (ref 4–11)

## 2022-11-09 PROCEDURE — 80061 LIPID PANEL: CPT | Performed by: INTERNAL MEDICINE

## 2022-11-09 PROCEDURE — 80053 COMPREHEN METABOLIC PANEL: CPT | Performed by: INTERNAL MEDICINE

## 2022-11-09 PROCEDURE — 71046 X-RAY EXAM CHEST 2 VIEWS: CPT | Performed by: INTERNAL MEDICINE

## 2022-11-09 PROCEDURE — 99397 PER PM REEVAL EST PAT 65+ YR: CPT | Performed by: INTERNAL MEDICINE

## 2022-11-09 PROCEDURE — 84550 ASSAY OF BLOOD/URIC ACID: CPT

## 2022-11-09 PROCEDURE — 3077F SYST BP >= 140 MM HG: CPT | Performed by: INTERNAL MEDICINE

## 2022-11-09 PROCEDURE — 3078F DIAST BP <80 MM HG: CPT | Performed by: INTERNAL MEDICINE

## 2022-11-09 PROCEDURE — 90662 IIV NO PRSV INCREASED AG IM: CPT | Performed by: INTERNAL MEDICINE

## 2022-11-09 PROCEDURE — 84443 ASSAY THYROID STIM HORMONE: CPT | Performed by: INTERNAL MEDICINE

## 2022-11-09 PROCEDURE — 90471 IMMUNIZATION ADMIN: CPT | Performed by: INTERNAL MEDICINE

## 2022-11-09 PROCEDURE — 3008F BODY MASS INDEX DOCD: CPT | Performed by: INTERNAL MEDICINE

## 2022-11-09 PROCEDURE — 99213 OFFICE O/P EST LOW 20 MIN: CPT | Performed by: INTERNAL MEDICINE

## 2022-11-09 PROCEDURE — 83036 HEMOGLOBIN GLYCOSYLATED A1C: CPT | Performed by: INTERNAL MEDICINE

## 2022-11-09 PROCEDURE — 85027 COMPLETE CBC AUTOMATED: CPT | Performed by: INTERNAL MEDICINE

## 2022-11-09 PROCEDURE — 82306 VITAMIN D 25 HYDROXY: CPT | Performed by: INTERNAL MEDICINE

## 2022-11-09 PROCEDURE — 36415 COLL VENOUS BLD VENIPUNCTURE: CPT | Performed by: INTERNAL MEDICINE

## 2022-11-09 RX ORDER — AMLODIPINE BESYLATE 5 MG/1
5 TABLET ORAL NIGHTLY
Qty: 90 TABLET | Refills: 9 | Status: SHIPPED | OUTPATIENT
Start: 2022-11-09

## 2022-11-09 RX ORDER — ASPIRIN 81 MG/1
81 TABLET ORAL DAILY
Qty: 90 TABLET | Refills: 9 | Status: SHIPPED | OUTPATIENT
Start: 2022-11-09

## 2022-11-09 RX ORDER — ROSUVASTATIN CALCIUM 10 MG/1
10 TABLET, COATED ORAL NIGHTLY
Qty: 90 TABLET | Refills: 9 | Status: SHIPPED | OUTPATIENT
Start: 2022-11-09

## 2022-11-09 RX ORDER — ALBUTEROL SULFATE 90 UG/1
2 AEROSOL, METERED RESPIRATORY (INHALATION) EVERY 6 HOURS PRN
Qty: 3 EACH | Refills: 9 | Status: SHIPPED | OUTPATIENT
Start: 2022-11-09

## 2022-11-09 RX ORDER — TELMISARTAN 40 MG/1
40 TABLET ORAL NIGHTLY
Qty: 90 TABLET | Refills: 9 | Status: SHIPPED | OUTPATIENT
Start: 2022-11-09

## 2022-11-09 NOTE — PATIENT INSTRUCTIONS
I would like for you to continue with amlodipine 5 mg but we will add in telmisartan 40 mg. Our blood pressure goal is consistently less than 130/80. If you notice that when you take telmisartan your blood pressure dramatically improves it is reasonable to then increase this dose of telmisartan but also stop amlodipine, I do not believe amlodipine is providing you with much benefit. If when you are taking both medications at any point in time you notice you are feeling lightheaded or dizzy upon sitting or standing or your blood pressure is in the low 110 over 60s then please stop amlodipine and let me know. The reason we want to use telmisartan over amlodipine is that telmisartan will help protect your kidneys your heart and provides other hormonal benefits but amlodipine does not.

## 2022-12-20 ENCOUNTER — OFFICE VISIT (OUTPATIENT)
Dept: INTERNAL MEDICINE CLINIC | Facility: CLINIC | Age: 67
End: 2022-12-20
Payer: COMMERCIAL

## 2022-12-20 VITALS
HEART RATE: 76 BPM | SYSTOLIC BLOOD PRESSURE: 133 MMHG | HEIGHT: 68 IN | BODY MASS INDEX: 28.64 KG/M2 | WEIGHT: 189 LBS | DIASTOLIC BLOOD PRESSURE: 78 MMHG

## 2022-12-20 DIAGNOSIS — E78.2 MIXED HYPERLIPIDEMIA: Primary | ICD-10-CM

## 2022-12-20 DIAGNOSIS — I10 HYPERTENSION GOAL BP (BLOOD PRESSURE) < 130/80: ICD-10-CM

## 2022-12-20 DIAGNOSIS — M1A.9XX0 CHRONIC GOUT WITHOUT TOPHUS, UNSPECIFIED CAUSE, UNSPECIFIED SITE: ICD-10-CM

## 2022-12-20 PROCEDURE — 3078F DIAST BP <80 MM HG: CPT | Performed by: INTERNAL MEDICINE

## 2022-12-20 PROCEDURE — 3008F BODY MASS INDEX DOCD: CPT | Performed by: INTERNAL MEDICINE

## 2022-12-20 PROCEDURE — 3075F SYST BP GE 130 - 139MM HG: CPT | Performed by: INTERNAL MEDICINE

## 2022-12-20 PROCEDURE — 99214 OFFICE O/P EST MOD 30 MIN: CPT | Performed by: INTERNAL MEDICINE

## 2022-12-20 RX ORDER — ROSUVASTATIN CALCIUM 10 MG/1
10 TABLET, COATED ORAL NIGHTLY
Qty: 90 TABLET | Refills: 9 | Status: SHIPPED | OUTPATIENT
Start: 2022-12-20

## 2022-12-20 RX ORDER — PREDNISONE 10 MG/1
TABLET ORAL
Qty: 18 TABLET | Refills: 0 | Status: SHIPPED | OUTPATIENT
Start: 2022-12-20 | End: 2022-12-29

## 2022-12-20 RX ORDER — TELMISARTAN 40 MG/1
40 TABLET ORAL NIGHTLY
Qty: 90 TABLET | Refills: 9 | Status: SHIPPED | OUTPATIENT
Start: 2022-12-20

## 2022-12-20 RX ORDER — ALLOPURINOL 100 MG/1
100 TABLET ORAL DAILY
Qty: 90 TABLET | Refills: 3 | Status: SHIPPED | OUTPATIENT
Start: 2022-12-20

## 2022-12-20 RX ORDER — AMLODIPINE BESYLATE 5 MG/1
5 TABLET ORAL NIGHTLY
Qty: 90 TABLET | Refills: 9 | Status: SHIPPED | OUTPATIENT
Start: 2022-12-20

## 2022-12-20 NOTE — PATIENT INSTRUCTIONS
We will continue with telmisartan 40 mg and amlodipine 5 mg however it is reasonable to increase your telmisartan to 80 mg and then stop the amlodipine completely but I am not sure how your blood pressure will respond. Since you are well controlled we are more than happy to keep it the same but if you decide you would like to take telmisartan 80 mg and stop the amlodipine go ahead and do so but let me know about 1 to 2 weeks later how your blood pressure is doing so we can document. I will print rosuvastatin for you to take to the pharmacy. We will send to the [address] . We will start you on allopurinol 100 mg daily to help with your uric acid/gout levels, I have given you prednisone that you may use for gout flare if naproxen does not alleviate that gout flare. I have placed an order for uric acid to be done in about 4 to 6 weeks, the goal is to get your uric acid preferably less than 6 so we can reduce the number of flares and then we can try to remove the allopurinol and see how you do. I will schedule a follow-up depending on your uric acid level, for now we will plan to see you back for the annual and then as needed in between.

## 2023-07-03 ENCOUNTER — MED REC SCAN ONLY (OUTPATIENT)
Dept: INTERNAL MEDICINE CLINIC | Facility: CLINIC | Age: 68
End: 2023-07-03

## 2023-08-30 ENCOUNTER — APPOINTMENT (OUTPATIENT)
Dept: GENERAL RADIOLOGY | Facility: HOSPITAL | Age: 68
End: 2023-08-30
Attending: EMERGENCY MEDICINE
Payer: COMMERCIAL

## 2023-08-30 ENCOUNTER — HOSPITAL ENCOUNTER (EMERGENCY)
Facility: HOSPITAL | Age: 68
Discharge: HOME | End: 2023-08-30
Attending: EMERGENCY MEDICINE
Payer: COMMERCIAL

## 2023-08-30 ENCOUNTER — OFFICE VISIT (OUTPATIENT)
Dept: INTERNAL MEDICINE CLINIC | Facility: CLINIC | Age: 68
End: 2023-08-30

## 2023-08-30 ENCOUNTER — NURSE TRIAGE (OUTPATIENT)
Dept: INTERNAL MEDICINE CLINIC | Facility: CLINIC | Age: 68
End: 2023-08-30

## 2023-08-30 VITALS
SYSTOLIC BLOOD PRESSURE: 143 MMHG | TEMPERATURE: 99 F | DIASTOLIC BLOOD PRESSURE: 78 MMHG | WEIGHT: 194 LBS | HEIGHT: 68 IN | RESPIRATION RATE: 29 BRPM | OXYGEN SATURATION: 94 % | BODY MASS INDEX: 29.4 KG/M2 | HEART RATE: 84 BPM

## 2023-08-30 VITALS
HEART RATE: 100 BPM | DIASTOLIC BLOOD PRESSURE: 70 MMHG | OXYGEN SATURATION: 98 % | WEIGHT: 198 LBS | BODY MASS INDEX: 30 KG/M2 | TEMPERATURE: 99 F | SYSTOLIC BLOOD PRESSURE: 150 MMHG

## 2023-08-30 DIAGNOSIS — J18.9 COMMUNITY ACQUIRED PNEUMONIA OF RIGHT LOWER LOBE OF LUNG: Primary | ICD-10-CM

## 2023-08-30 DIAGNOSIS — R06.2 WHEEZING: ICD-10-CM

## 2023-08-30 DIAGNOSIS — R06.02 SOB (SHORTNESS OF BREATH): ICD-10-CM

## 2023-08-30 DIAGNOSIS — R05.1 ACUTE COUGH: Primary | ICD-10-CM

## 2023-08-30 DIAGNOSIS — R00.0 TACHYCARDIA: ICD-10-CM

## 2023-08-30 LAB
ANION GAP SERPL CALC-SCNC: 1 MMOL/L (ref 0–18)
ATRIAL RATE: 89 BPM
BASOPHILS # BLD AUTO: 0.04 X10(3) UL (ref 0–0.2)
BASOPHILS NFR BLD AUTO: 0.5 %
BUN BLD-MCNC: 6 MG/DL (ref 7–18)
BUN/CREAT SERPL: 5.8 (ref 10–20)
CALCIUM BLD-MCNC: 8.7 MG/DL (ref 8.5–10.1)
CHLORIDE SERPL-SCNC: 97 MMOL/L (ref 98–112)
CO2 SERPL-SCNC: 33 MMOL/L (ref 21–32)
CREAT BLD-MCNC: 1.04 MG/DL
DEPRECATED RDW RBC AUTO: 39.2 FL (ref 35.1–46.3)
EGFRCR SERPLBLD CKD-EPI 2021: 78 ML/MIN/1.73M2 (ref 60–?)
EOSINOPHIL # BLD AUTO: 0.91 X10(3) UL (ref 0–0.7)
EOSINOPHIL NFR BLD AUTO: 10.7 %
ERYTHROCYTE [DISTWIDTH] IN BLOOD BY AUTOMATED COUNT: 12.7 % (ref 11–15)
FLUAV + FLUBV RNA SPEC NAA+PROBE: NEGATIVE
FLUAV + FLUBV RNA SPEC NAA+PROBE: NEGATIVE
GLUCOSE BLD-MCNC: 141 MG/DL (ref 70–99)
HCT VFR BLD AUTO: 39.2 %
HGB BLD-MCNC: 13.8 G/DL
IMM GRANULOCYTES # BLD AUTO: 0.04 X10(3) UL (ref 0–1)
IMM GRANULOCYTES NFR BLD: 0.5 %
LYMPHOCYTES # BLD AUTO: 1.1 X10(3) UL (ref 1–4)
LYMPHOCYTES NFR BLD AUTO: 13 %
MCH RBC QN AUTO: 29.9 PG (ref 26–34)
MCHC RBC AUTO-ENTMCNC: 35.2 G/DL (ref 31–37)
MCV RBC AUTO: 84.8 FL
MONOCYTES # BLD AUTO: 0.5 X10(3) UL (ref 0.1–1)
MONOCYTES NFR BLD AUTO: 5.9 %
NEUTROPHILS # BLD AUTO: 5.88 X10 (3) UL (ref 1.5–7.7)
NEUTROPHILS # BLD AUTO: 5.88 X10(3) UL (ref 1.5–7.7)
NEUTROPHILS NFR BLD AUTO: 69.4 %
NT-PROBNP SERPL-MCNC: 316 PG/ML (ref ?–125)
OSMOLALITY SERPL CALC.SUM OF ELEC: 272 MOSM/KG (ref 275–295)
P AXIS: 41 DEGREES
P-R INTERVAL: 222 MS
PLATELET # BLD AUTO: 182 10(3)UL (ref 150–450)
POTASSIUM SERPL-SCNC: 3.8 MMOL/L (ref 3.5–5.1)
Q-T INTERVAL: 422 MS
QRS DURATION: 182 MS
QTC CALCULATION (BEZET): 513 MS
R AXIS: -73 DEGREES
RBC # BLD AUTO: 4.62 X10(6)UL
RSV RNA SPEC NAA+PROBE: NEGATIVE
SARS-COV-2 RNA RESP QL NAA+PROBE: NOT DETECTED
SODIUM SERPL-SCNC: 131 MMOL/L (ref 136–145)
T AXIS: 100 DEGREES
TROPONIN I HIGH SENSITIVITY: 17 NG/L
VENTRICULAR RATE: 89 BPM
WBC # BLD AUTO: 8.5 X10(3) UL (ref 4–11)

## 2023-08-30 PROCEDURE — 99284 EMERGENCY DEPT VISIT MOD MDM: CPT

## 2023-08-30 PROCEDURE — 83880 ASSAY OF NATRIURETIC PEPTIDE: CPT | Performed by: EMERGENCY MEDICINE

## 2023-08-30 PROCEDURE — 93010 ELECTROCARDIOGRAM REPORT: CPT

## 2023-08-30 PROCEDURE — 3077F SYST BP >= 140 MM HG: CPT | Performed by: INTERNAL MEDICINE

## 2023-08-30 PROCEDURE — 80048 BASIC METABOLIC PNL TOTAL CA: CPT | Performed by: EMERGENCY MEDICINE

## 2023-08-30 PROCEDURE — 36415 COLL VENOUS BLD VENIPUNCTURE: CPT

## 2023-08-30 PROCEDURE — 71045 X-RAY EXAM CHEST 1 VIEW: CPT | Performed by: EMERGENCY MEDICINE

## 2023-08-30 PROCEDURE — 99214 OFFICE O/P EST MOD 30 MIN: CPT | Performed by: INTERNAL MEDICINE

## 2023-08-30 PROCEDURE — 0241U SARS-COV-2/FLU A AND B/RSV BY PCR (GENEXPERT): CPT | Performed by: EMERGENCY MEDICINE

## 2023-08-30 PROCEDURE — 3078F DIAST BP <80 MM HG: CPT | Performed by: INTERNAL MEDICINE

## 2023-08-30 PROCEDURE — 85025 COMPLETE CBC W/AUTO DIFF WBC: CPT | Performed by: EMERGENCY MEDICINE

## 2023-08-30 PROCEDURE — 84484 ASSAY OF TROPONIN QUANT: CPT | Performed by: EMERGENCY MEDICINE

## 2023-08-30 PROCEDURE — 93005 ELECTROCARDIOGRAM TRACING: CPT

## 2023-08-30 RX ORDER — DOXYCYCLINE HYCLATE 100 MG/1
100 CAPSULE ORAL 2 TIMES DAILY
Qty: 14 CAPSULE | Refills: 0 | Status: SHIPPED | OUTPATIENT
Start: 2023-08-30 | End: 2023-09-06

## 2023-08-30 NOTE — ED QUICK NOTES
Patient ok to discharge per MD. IV removed by this RN. Patient stated understanding after receiving discharge instructions. All questions answered. Patient discharged in stable condition.

## 2023-08-30 NOTE — ED INITIAL ASSESSMENT (HPI)
C/o chest congestion and daylin since Monday night.  Sent in by PCP for further workup  Denies fevers, endorses chills

## 2023-09-01 ENCOUNTER — OFFICE VISIT (OUTPATIENT)
Dept: INTERNAL MEDICINE CLINIC | Facility: CLINIC | Age: 68
End: 2023-09-01

## 2023-09-01 VITALS
BODY MASS INDEX: 29.58 KG/M2 | HEIGHT: 68 IN | SYSTOLIC BLOOD PRESSURE: 126 MMHG | TEMPERATURE: 98 F | HEART RATE: 76 BPM | RESPIRATION RATE: 14 BRPM | WEIGHT: 195.19 LBS | OXYGEN SATURATION: 97 % | DIASTOLIC BLOOD PRESSURE: 76 MMHG

## 2023-09-01 DIAGNOSIS — J18.9 COMMUNITY ACQUIRED PNEUMONIA, UNSPECIFIED LATERALITY: Primary | ICD-10-CM

## 2023-09-01 PROCEDURE — 3078F DIAST BP <80 MM HG: CPT | Performed by: INTERNAL MEDICINE

## 2023-09-01 PROCEDURE — 3074F SYST BP LT 130 MM HG: CPT | Performed by: INTERNAL MEDICINE

## 2023-09-01 PROCEDURE — 99213 OFFICE O/P EST LOW 20 MIN: CPT | Performed by: INTERNAL MEDICINE

## 2023-09-01 PROCEDURE — 3008F BODY MASS INDEX DOCD: CPT | Performed by: INTERNAL MEDICINE

## 2023-09-22 ENCOUNTER — TELEPHONE (OUTPATIENT)
Dept: INTERNAL MEDICINE CLINIC | Facility: CLINIC | Age: 68
End: 2023-09-22

## 2023-09-22 NOTE — TELEPHONE ENCOUNTER
Patient is requesting a Pre-Op visit with Dr Hill Avery - patient has eye surgery scheduled on 10-6, Dr Vinny Kim

## 2023-09-26 ENCOUNTER — LAB ENCOUNTER (OUTPATIENT)
Dept: LAB | Age: 68
End: 2023-09-26
Attending: INTERNAL MEDICINE
Payer: COMMERCIAL

## 2023-09-26 ENCOUNTER — OFFICE VISIT (OUTPATIENT)
Dept: INTERNAL MEDICINE CLINIC | Facility: CLINIC | Age: 68
End: 2023-09-26

## 2023-09-26 VITALS
SYSTOLIC BLOOD PRESSURE: 136 MMHG | BODY MASS INDEX: 29.27 KG/M2 | OXYGEN SATURATION: 98 % | TEMPERATURE: 98 F | WEIGHT: 193.13 LBS | HEIGHT: 68 IN | HEART RATE: 78 BPM | DIASTOLIC BLOOD PRESSURE: 80 MMHG

## 2023-09-26 DIAGNOSIS — I44.2 AV BLOCK, 3RD DEGREE (HCC): ICD-10-CM

## 2023-09-26 DIAGNOSIS — Z01.818 PREOP GENERAL PHYSICAL EXAM: Primary | ICD-10-CM

## 2023-09-26 DIAGNOSIS — Z95.0 HISTORY OF PERMANENT CARDIAC PACEMAKER PLACEMENT: ICD-10-CM

## 2023-09-26 DIAGNOSIS — Z01.818 PREOP GENERAL PHYSICAL EXAM: ICD-10-CM

## 2023-09-26 DIAGNOSIS — H17.9 CORNEAL SCARRING: ICD-10-CM

## 2023-09-26 DIAGNOSIS — I10 PRIMARY HYPERTENSION: ICD-10-CM

## 2023-09-26 DIAGNOSIS — E78.5 DYSLIPIDEMIA: ICD-10-CM

## 2023-09-26 LAB
ANION GAP SERPL CALC-SCNC: 6 MMOL/L (ref 0–18)
BILIRUB UR QL STRIP.AUTO: NEGATIVE
BUN BLD-MCNC: 15 MG/DL (ref 7–18)
CALCIUM BLD-MCNC: 9.2 MG/DL (ref 8.5–10.1)
CHLORIDE SERPL-SCNC: 106 MMOL/L (ref 98–112)
CLARITY UR REFRACT.AUTO: CLEAR
CO2 SERPL-SCNC: 27 MMOL/L (ref 21–32)
CREAT BLD-MCNC: 1.26 MG/DL
EGFRCR SERPLBLD CKD-EPI 2021: 62 ML/MIN/1.73M2 (ref 60–?)
FASTING STATUS PATIENT QL REPORTED: NO
GLUCOSE BLD-MCNC: 125 MG/DL (ref 70–99)
GLUCOSE UR STRIP.AUTO-MCNC: NORMAL MG/DL
KETONES UR STRIP.AUTO-MCNC: NEGATIVE MG/DL
LEUKOCYTE ESTERASE UR QL STRIP.AUTO: NEGATIVE
NITRITE UR QL STRIP.AUTO: NEGATIVE
OSMOLALITY SERPL CALC.SUM OF ELEC: 290 MOSM/KG (ref 275–295)
PH UR STRIP.AUTO: 6.5 [PH] (ref 5–8)
POTASSIUM SERPL-SCNC: 4.3 MMOL/L (ref 3.5–5.1)
PROT UR STRIP.AUTO-MCNC: NEGATIVE MG/DL
RBC UR QL AUTO: NEGATIVE
SODIUM SERPL-SCNC: 139 MMOL/L (ref 136–145)
SP GR UR STRIP.AUTO: 1.02 (ref 1–1.03)
UROBILINOGEN UR STRIP.AUTO-MCNC: NORMAL MG/DL

## 2023-09-26 PROCEDURE — 3008F BODY MASS INDEX DOCD: CPT | Performed by: INTERNAL MEDICINE

## 2023-09-26 PROCEDURE — 90471 IMMUNIZATION ADMIN: CPT | Performed by: INTERNAL MEDICINE

## 2023-09-26 PROCEDURE — 81003 URINALYSIS AUTO W/O SCOPE: CPT

## 2023-09-26 PROCEDURE — 90662 IIV NO PRSV INCREASED AG IM: CPT | Performed by: INTERNAL MEDICINE

## 2023-09-26 PROCEDURE — 80048 BASIC METABOLIC PNL TOTAL CA: CPT

## 2023-09-26 PROCEDURE — 3079F DIAST BP 80-89 MM HG: CPT | Performed by: INTERNAL MEDICINE

## 2023-09-26 PROCEDURE — 3075F SYST BP GE 130 - 139MM HG: CPT | Performed by: INTERNAL MEDICINE

## 2023-09-26 PROCEDURE — 99214 OFFICE O/P EST MOD 30 MIN: CPT | Performed by: INTERNAL MEDICINE

## 2023-09-26 PROCEDURE — 36415 COLL VENOUS BLD VENIPUNCTURE: CPT

## 2023-09-26 RX ORDER — ROSUVASTATIN CALCIUM 5 MG/1
5 TABLET, COATED ORAL NIGHTLY
Qty: 90 TABLET | Refills: 1 | Status: SHIPPED | OUTPATIENT
Start: 2023-09-26

## 2023-09-26 NOTE — PROGRESS NOTES
Subjective:     Patient ID: Ilia Thomason is a 69year old male. Patient presents today for preop medical clearance as requested by optha Dr Frankey Ben for scheduled left penetrating keratoplasty to be done on Oct 6,2023 at [facility].       History/Other:   Review of Systems   Constitutional: Negative. HENT: Negative. Eyes: Negative. Respiratory: Negative. Negative for cough, shortness of breath and wheezing. No hemoptysis   Cardiovascular:  Negative for chest pain, palpitations and leg swelling. No pnd/orthopnea  He is able to climb 2 flight of stairs at home with no chest pains. Gastrointestinal: Negative. Negative for anal bleeding, blood in stool and nausea. Genitourinary: Negative. Neurological:  Negative for syncope. Hematological: Negative. Current Outpatient Medications   Medication Sig Dispense Refill    telmisartan 40 MG Oral Tab Take 1 tablet (40 mg total) by mouth nightly. FOR BLOOD PRESSURE 90 tablet 9    aspirin (ASPIRIN 81) 81 MG Oral Tab EC Take 1 tablet (81 mg total) by mouth in the morning. 90 tablet 9    rosuvastatin 10 MG Oral Tab Take 1 tablet (10 mg total) by mouth nightly. FOR CHOLESTEROL. GOODRX/CASH (Patient not taking: Reported on 8/30/2023) 90 tablet 9    allopurinol 100 MG Oral Tab Take 1 tablet (100 mg total) by mouth daily. TO PREVENT GOUT. (Patient not taking: Reported on 9/1/2023) 90 tablet 3    amLODIPine 5 MG Oral Tab Take 1 tablet (5 mg total) by mouth at bedtime. FOR BLOOD PRESSURE. (Patient not taking: Reported on 8/30/2023) 90 tablet 9    albuterol 108 (90 Base) MCG/ACT Inhalation Aero Soln Inhale 2 puffs into the lungs every 6 (six) hours as needed for Wheezing or Shortness of Breath (cough). (Patient not taking: Reported on 9/1/2023) 3 each 9    naproxen 500 MG Oral Tab Take 1 tablet (500 mg total) by mouth 2 (two) times daily with meals.  FOR 5-7 DAYS FOR ACUTE GOUT FLARE. (Patient not taking: Reported on 8/30/2023) 30 tablet 3     Allergies:  Atarax [Hydroxyzine]    FATIGUE  Shrimp                  OTHER (SEE COMMENTS)    Comment:Gout flare. Past Medical History:   Diagnosis Date    AV block, 3rd degree (HCC)     Cervical spondylosis     Essential hypertension 5/27/2015    Gout     High blood pressure     LBBB (left bundle branch block) 5/27/2015    Osteoarthritis     Prediabetes     Unspecified essential hypertension       Past Surgical History:   Procedure Laterality Date    CARDIAC CATHETERIZATION      CARDIAC PACEMAKER PLACEMENT      COLONOSCOPY N/A 11/25/2017    Procedure: COLONOSCOPY;  Surgeon: Kalyn Treviño MD;  Location: [address] [place] [address] ENDOSCOPY    OTHER SURGICAL HISTORY  October 2015    pacemaker      Family History   Problem Relation Age of Onset    Diabetes Mother     Hypertension Mother     Heart Disorder Mother     Diabetes Father     Pulmonary Disease Brother         pulmonary fibrosis frm RA    Cancer Brother         prostate cancer    Diabetes Brother     Cancer Sister         breast cancer      Social History:   Social History     Socioeconomic History    Marital status:    Tobacco Use    Smoking status: Never     Passive exposure: Never    Smokeless tobacco: Never   Vaping Use    Vaping Use: Never used   Substance and Sexual Activity    Alcohol use: No     Alcohol/week: 0.0 standard drinks of alcohol    Drug use: No   Other Topics Concern    Caffeine Concern Yes     Comment: coffee, 2 or more cups daily    Exercise No    Pt has a pacemaker Yes    Pt has a defibrillator No    Reaction to local anesthetic No   Social History Narrative    The patient does not use an assistive device. .      The patient does live in a home with stairs. Lives with wife     Drives a taxi for work    Born in [place]        Objective:   Physical Exam  Constitutional:       General: He is not in acute distress. Appearance: He is well-developed. He is not ill-appearing, toxic-appearing or diaphoretic.    HENT: Head: Normocephalic and atraumatic. Right Ear: Tympanic membrane, ear canal and external ear normal.      Left Ear: Tympanic membrane, ear canal and external ear normal.      Nose: Nose normal.      Mouth/Throat:      Pharynx: No oropharyngeal exudate. Eyes:      General: No scleral icterus. Right eye: No discharge. Left eye: No discharge. Conjunctiva/sclera: Conjunctivae normal.      Pupils: Pupils are equal, round, and reactive to light. Neck:      Thyroid: No thyromegaly. Vascular: No carotid bruit or JVD. Cardiovascular:      Rate and Rhythm: Normal rate and regular rhythm. Pulses: Normal pulses. Heart sounds: Normal heart sounds. No murmur heard. No friction rub. No gallop. Pulmonary:      Effort: Pulmonary effort is normal. No respiratory distress. Breath sounds: Normal breath sounds. No wheezing or rales. Abdominal:      General: Abdomen is flat. Bowel sounds are normal. There is no distension. Palpations: Abdomen is soft. There is no mass. Tenderness: There is no abdominal tenderness. There is no guarding or rebound. Musculoskeletal:         General: Normal range of motion. Cervical back: Normal range of motion and neck supple. No rigidity or tenderness. Right lower leg: No edema. Left lower leg: No edema. Lymphadenopathy:      Cervical: No cervical adenopathy. Skin:     General: Skin is warm and dry. Coloration: Skin is not jaundiced or pale. Findings: No rash. Neurological:      Mental Status: He is alert and oriented to person, place, and time. Psychiatric:         Mood and Affect: Mood normal.       Assessment & Plan:   (H11.568) Preop general physical exam  (primary encounter diagnosis)  Plan: Urinalysis, Routine [E], Basic Metabolic Panel         (8)        Pt has no active cardiac condtion  and has good functional capacity, able to do >4METS activities without getting chest pains.  His preop labs cbc, bmp and ua were acceptable range. His EKG showed Paced rhythm. Pt has acceptable risk for his surgery and may proceed as planned.     (H17.9) Corneal scarring  Plan: as per recommendation of optha Dr Mariluz Valle.    (Judyann Merlin) Primary hypertension  Plan: pt told to take his telmisartan 40mg daily.     (E78.5) Dyslipidemia  Plan: pt told he can take rosuvastatin 5mg daily;     (I44.2) AV block, 3rd degree ([address]  Plan: pt with remote history of 3rd degree AVBlock back in 2015 and had normal coronary angiogram at that time. Had PPM done at that time.     (Z95.0) History of permanent cardiac pacemaker placement  Plan: see above. I told pt to contact his cardiologist today and get also clearance from his cardiologist for his surgery which pt had agreed to do . No orders of the defined types were placed in this encounter.       Meds This Visit:  Requested Prescriptions      No prescriptions requested or ordered in this encounter       Imaging & Referrals:  [facility]U [facility]ACC HIGH DOSE PRSV FREE

## 2023-09-28 ENCOUNTER — TELEPHONE (OUTPATIENT)
Dept: INTERNAL MEDICINE CLINIC | Facility: CLINIC | Age: 68
End: 2023-09-28

## 2023-09-28 NOTE — TELEPHONE ENCOUNTER
Did he contact his cardiologist Dr Jose Guadalupe Camarillo as I had asked him to? He has pacemaker and we need Dr Jose Guadalupe Camarillo also to clear him for his surgery.

## 2023-09-28 NOTE — TELEPHONE ENCOUNTER
Patient is calling and checking to see if the form he needs to be sent to [facility] was sent yet. Please update patient.

## 2023-09-28 NOTE — TELEPHONE ENCOUNTER
Spoke with pt, verified , informed pt of Dr Garay Large message, pt states that he contacted cardiologist and was advised that Dr Hugo Stringer will give the office a call, I explained to pt that there must be some miscommunication and that he needs to contact his cardiologist office and schedule an appt with Dr Lorena Gillespie. Pt was advised that Dr Ankur Mace cannot clear pt for surgery without cardiac clearance, pt verbalized understanding.

## 2023-10-03 RX ORDER — ROSUVASTATIN CALCIUM 5 MG/1
5 TABLET, COATED ORAL NIGHTLY
COMMUNITY

## 2023-10-03 RX ORDER — TELMISARTAN 40 MG/1
40 TABLET ORAL NIGHTLY
COMMUNITY

## 2023-10-03 ASSESSMENT — ACTIVITIES OF DAILY LIVING (ADL)
HISTORY OF FALLING IN THE LAST YEAR (PRIOR TO ADMISSION): NO
SENSORY_SUPPORT_DEVICES: EYEGLASSES
ADL_SCORE: 12
ADL_BEFORE_ADMISSION: INDEPENDENT

## 2023-10-03 NOTE — TELEPHONE ENCOUNTER
Called [facility] at [phone], spoke with Luis Alfredo Anthony, pt has an appt on 10/4 for cardiac clearance.

## 2023-10-04 ENCOUNTER — TELEPHONE (OUTPATIENT)
Dept: INTERNAL MEDICINE CLINIC | Facility: CLINIC | Age: 68
End: 2023-10-04

## 2023-10-05 NOTE — TELEPHONE ENCOUNTER
Medical and cardiac clearance faxed to [facility] at [phone], confirmation received.

## 2023-10-06 ENCOUNTER — ANESTHESIA EVENT (OUTPATIENT)
Dept: SURGERY | Age: 68
End: 2023-10-06

## 2023-10-06 ENCOUNTER — HOSPITAL ENCOUNTER (OUTPATIENT)
Age: 68
Discharge: HOME | End: 2023-10-06
Attending: OPHTHALMOLOGY | Admitting: OPHTHALMOLOGY

## 2023-10-06 ENCOUNTER — ANESTHESIA (OUTPATIENT)
Dept: SURGERY | Age: 68
End: 2023-10-06

## 2023-10-06 LAB
GLUCOSE BLDC GLUCOMTR-MCNC: 115 MG/DL (ref 70–99)
GLUCOSE BLDC GLUCOMTR-MCNC: 128 MG/DL (ref 70–99)

## 2023-10-06 PROCEDURE — 10002800 HB RX 250 W HCPCS: Performed by: OPHTHALMOLOGY

## 2023-10-06 PROCEDURE — 13000003 HB ANESTHESIA  GENERAL EA ADD MINUTE: Performed by: OPHTHALMOLOGY

## 2023-10-06 PROCEDURE — 10002801 HB RX 250 W/O HCPCS: Performed by: NURSE ANESTHETIST, CERTIFIED REGISTERED

## 2023-10-06 PROCEDURE — V2785 CORNEAL TISSUE PROCESSING: HCPCS | Performed by: OPHTHALMOLOGY

## 2023-10-06 PROCEDURE — 10006023 HB SUPPLY 272: Performed by: OPHTHALMOLOGY

## 2023-10-06 PROCEDURE — 10002807 HB RX 258: Performed by: OPHTHALMOLOGY

## 2023-10-06 PROCEDURE — 10002801 HB RX 250 W/O HCPCS: Performed by: OPHTHALMOLOGY

## 2023-10-06 PROCEDURE — 10004651 HB RX, NO CHARGE ITEM: Performed by: OPHTHALMOLOGY

## 2023-10-06 PROCEDURE — 13000039 HB MAJOR COMPLEX CASE EA ADD MINUTE: Performed by: OPHTHALMOLOGY

## 2023-10-06 PROCEDURE — 82962 GLUCOSE BLOOD TEST: CPT

## 2023-10-06 PROCEDURE — 10002800 HB RX 250 W HCPCS: Performed by: NURSE ANESTHETIST, CERTIFIED REGISTERED

## 2023-10-06 PROCEDURE — 10002807 HB RX 258: Performed by: ANESTHESIOLOGY

## 2023-10-06 PROCEDURE — 87205 SMEAR GRAM STAIN: CPT | Performed by: OPHTHALMOLOGY

## 2023-10-06 PROCEDURE — 10002803 HB RX 637: Performed by: ANESTHESIOLOGY

## 2023-10-06 PROCEDURE — 10004452 HB PACU ADDL 30 MINUTES: Performed by: OPHTHALMOLOGY

## 2023-10-06 PROCEDURE — 13000038 HB MAJOR COMPLEX CASE S/U + 1ST 15 MIN: Performed by: OPHTHALMOLOGY

## 2023-10-06 PROCEDURE — 10002803 HB RX 637

## 2023-10-06 PROCEDURE — 13000002 HB ANESTHESIA  GENERAL  S/U + 1ST 15 MIN: Performed by: OPHTHALMOLOGY

## 2023-10-06 PROCEDURE — 10004451 HB PACU RECOVERY 1ST 30 MINUTES: Performed by: OPHTHALMOLOGY

## 2023-10-06 PROCEDURE — 10002803 HB RX 637: Performed by: OPHTHALMOLOGY

## 2023-10-06 PROCEDURE — 10006027 HB SUPPLY 278: Performed by: OPHTHALMOLOGY

## 2023-10-06 PROCEDURE — 10002807 HB RX 258: Performed by: NURSE ANESTHETIST, CERTIFIED REGISTERED

## 2023-10-06 PROCEDURE — 13000001 HB PHASE II RECOVERY EA 30 MINUTES: Performed by: OPHTHALMOLOGY

## 2023-10-06 PROCEDURE — 10002801 HB RX 250 W/O HCPCS

## 2023-10-06 PROCEDURE — 10002800 HB RX 250 W HCPCS: Performed by: ANESTHESIOLOGY

## 2023-10-06 DEVICE — IMPLANT CRNL ALLOGRAFT RT PRC PKP: Type: IMPLANTABLE DEVICE | Site: EYE | Status: FUNCTIONAL

## 2023-10-06 RX ORDER — CHLORHEXIDINE GLUCONATE ORAL RINSE 1.2 MG/ML
15 SOLUTION DENTAL ONCE
Status: COMPLETED | OUTPATIENT
Start: 2023-10-06 | End: 2023-10-06

## 2023-10-06 RX ORDER — GLYCOPYRROLATE 0.2 MG/ML
INJECTION, SOLUTION INTRAMUSCULAR; INTRAVENOUS PRN
Status: DISCONTINUED | OUTPATIENT
Start: 2023-10-06 | End: 2023-10-06

## 2023-10-06 RX ORDER — SODIUM CHLORIDE 9 MG/ML
INJECTION, SOLUTION INTRAVENOUS CONTINUOUS
Status: DISCONTINUED | OUTPATIENT
Start: 2023-10-06 | End: 2023-10-06 | Stop reason: HOSPADM

## 2023-10-06 RX ORDER — 0.9 % SODIUM CHLORIDE 0.9 %
2 VIAL (ML) INJECTION EVERY 12 HOURS SCHEDULED
Status: DISCONTINUED | OUTPATIENT
Start: 2023-10-06 | End: 2023-10-06 | Stop reason: HOSPADM

## 2023-10-06 RX ORDER — ONDANSETRON 2 MG/ML
INJECTION INTRAMUSCULAR; INTRAVENOUS PRN
Status: DISCONTINUED | OUTPATIENT
Start: 2023-10-06 | End: 2023-10-06

## 2023-10-06 RX ORDER — NEOSTIGMINE METHYLSULFATE 4 MG/4 ML
SYRINGE (ML) INTRAVENOUS PRN
Status: DISCONTINUED | OUTPATIENT
Start: 2023-10-06 | End: 2023-10-06

## 2023-10-06 RX ORDER — PREDNISOLONE ACETATE 10 MG/ML
SUSPENSION/ DROPS OPHTHALMIC PRN
Status: DISCONTINUED | OUTPATIENT
Start: 2023-10-06 | End: 2023-10-06 | Stop reason: HOSPADM

## 2023-10-06 RX ORDER — LIDOCAINE HYDROCHLORIDE 20 MG/ML
INJECTION, SOLUTION INFILTRATION; PERINEURAL PRN
Status: DISCONTINUED | OUTPATIENT
Start: 2023-10-06 | End: 2023-10-06

## 2023-10-06 RX ORDER — PROCHLORPERAZINE EDISYLATE 5 MG/ML
10 INJECTION INTRAMUSCULAR; INTRAVENOUS ONCE
Status: COMPLETED | OUTPATIENT
Start: 2023-10-06 | End: 2023-10-06

## 2023-10-06 RX ORDER — KETOROLAC TROMETHAMINE 5 MG/ML
1 SOLUTION OPHTHALMIC 4 TIMES DAILY
Status: DISCONTINUED | OUTPATIENT
Start: 2023-10-06 | End: 2023-10-06 | Stop reason: HOSPADM

## 2023-10-06 RX ORDER — GENTAMICIN SULFATE 40 MG/ML
80 INJECTION, SOLUTION INTRAMUSCULAR; INTRAVENOUS ONCE
Status: COMPLETED | OUTPATIENT
Start: 2023-10-06 | End: 2023-10-06

## 2023-10-06 RX ORDER — KETOROLAC TROMETHAMINE 5 MG/ML
1 SOLUTION OPHTHALMIC 4 TIMES DAILY
Status: DISCONTINUED | OUTPATIENT
Start: 2023-10-07 | End: 2023-10-06

## 2023-10-06 RX ORDER — MIDAZOLAM HYDROCHLORIDE 1 MG/ML
INJECTION, SOLUTION INTRAMUSCULAR; INTRAVENOUS PRN
Status: DISCONTINUED | OUTPATIENT
Start: 2023-10-06 | End: 2023-10-06

## 2023-10-06 RX ORDER — DEXAMETHASONE SODIUM PHOSPHATE 4 MG/ML
INJECTION, SOLUTION INTRA-ARTICULAR; INTRALESIONAL; INTRAMUSCULAR; INTRAVENOUS; SOFT TISSUE PRN
Status: DISCONTINUED | OUTPATIENT
Start: 2023-10-06 | End: 2023-10-06 | Stop reason: HOSPADM

## 2023-10-06 RX ORDER — PREDNISOLONE ACETATE 10 MG/ML
1 SUSPENSION/ DROPS OPHTHALMIC 4 TIMES DAILY
Status: DISCONTINUED | OUTPATIENT
Start: 2023-10-07 | End: 2023-10-06 | Stop reason: HOSPADM

## 2023-10-06 RX ORDER — ACETAMINOPHEN 325 MG/1
650 TABLET ORAL EVERY 4 HOURS PRN
Status: DISCONTINUED | OUTPATIENT
Start: 2023-10-06 | End: 2023-10-06 | Stop reason: HOSPADM

## 2023-10-06 RX ORDER — KETOROLAC TROMETHAMINE 5 MG/ML
1 SOLUTION OPHTHALMIC 4 TIMES DAILY
Status: DISCONTINUED | OUTPATIENT
Start: 2023-10-06 | End: 2023-10-06 | Stop reason: SDUPTHER

## 2023-10-06 RX ORDER — ONDANSETRON 2 MG/ML
4 INJECTION INTRAMUSCULAR; INTRAVENOUS ONCE
Status: COMPLETED | OUTPATIENT
Start: 2023-10-06 | End: 2023-10-06

## 2023-10-06 RX ORDER — LIDOCAINE HYDROCHLORIDE 10 MG/ML
INJECTION, SOLUTION INFILTRATION; PERINEURAL PRN
Status: DISCONTINUED | OUTPATIENT
Start: 2023-10-06 | End: 2023-10-06 | Stop reason: HOSPADM

## 2023-10-06 RX ORDER — ROCURONIUM BROMIDE 10 MG/ML
INJECTION, SOLUTION INTRAVENOUS PRN
Status: DISCONTINUED | OUTPATIENT
Start: 2023-10-06 | End: 2023-10-06

## 2023-10-06 RX ORDER — HYDROCODONE BITARTRATE AND ACETAMINOPHEN 5; 325 MG/1; MG/1
1 TABLET ORAL ONCE
Status: COMPLETED | OUTPATIENT
Start: 2023-10-06 | End: 2023-10-06

## 2023-10-06 RX ORDER — SODIUM CHLORIDE 9 MG/ML
INJECTION, SOLUTION INTRAVENOUS CONTINUOUS PRN
Status: DISCONTINUED | OUTPATIENT
Start: 2023-10-06 | End: 2023-10-06

## 2023-10-06 RX ORDER — TETRACAINE HYDROCHLORIDE 5 MG/ML
SOLUTION OPHTHALMIC PRN
Status: DISCONTINUED | OUTPATIENT
Start: 2023-10-06 | End: 2023-10-06 | Stop reason: HOSPADM

## 2023-10-06 RX ORDER — PILOCARPINE HYDROCHLORIDE 20 MG/ML
1 SOLUTION/ DROPS OPHTHALMIC
Status: COMPLETED | OUTPATIENT
Start: 2023-10-06 | End: 2023-10-06

## 2023-10-06 RX ORDER — OFLOXACIN 3 MG/ML
1 SOLUTION/ DROPS OPHTHALMIC
Status: DISCONTINUED | OUTPATIENT
Start: 2023-10-06 | End: 2023-10-06 | Stop reason: HOSPADM

## 2023-10-06 RX ADMIN — MIDAZOLAM HYDROCHLORIDE 2 MG: 1 INJECTION, SOLUTION INTRAMUSCULAR; INTRAVENOUS at 07:45

## 2023-10-06 RX ADMIN — OFLOXACIN 1 DROP: 3 SOLUTION/ DROPS OPHTHALMIC at 05:55

## 2023-10-06 RX ADMIN — ACETAMINOPHEN 650 MG: 325 TABLET ORAL at 10:37

## 2023-10-06 RX ADMIN — OFLOXACIN 1 DROP: 3 SOLUTION/ DROPS OPHTHALMIC at 05:35

## 2023-10-06 RX ADMIN — SODIUM CHLORIDE: 9 INJECTION, SOLUTION INTRAVENOUS at 12:32

## 2023-10-06 RX ADMIN — PILOCARPINE HYDROCHLORIDE OPHTHALMIC SOLUTION 1 DROP: 20 SOLUTION/ DROPS OPHTHALMIC at 05:35

## 2023-10-06 RX ADMIN — SODIUM CHLORIDE: 9 INJECTION, SOLUTION INTRAVENOUS at 06:00

## 2023-10-06 RX ADMIN — CHLORHEXIDINE GLUCONATE 15 ML: 1.2 RINSE ORAL at 05:51

## 2023-10-06 RX ADMIN — FENTANYL CITRATE 25 MCG: 50 INJECTION, SOLUTION INTRAMUSCULAR; INTRAVENOUS at 09:51

## 2023-10-06 RX ADMIN — SODIUM CHLORIDE: 9 INJECTION, SOLUTION INTRAVENOUS at 07:45

## 2023-10-06 RX ADMIN — OFLOXACIN 1 DROP: 3 SOLUTION/ DROPS OPHTHALMIC at 05:44

## 2023-10-06 RX ADMIN — HYDROCODONE BITARTRATE AND ACETAMINOPHEN 1 TABLET: 5; 325 TABLET ORAL at 11:45

## 2023-10-06 RX ADMIN — NEOSTIGMINE METHYLSULFATE 4 MG: 1 INJECTION, SOLUTION INTRAVENOUS at 08:56

## 2023-10-06 RX ADMIN — FENTANYL CITRATE 25 MCG: 50 INJECTION, SOLUTION INTRAMUSCULAR; INTRAVENOUS at 09:39

## 2023-10-06 RX ADMIN — ROCURONIUM BROMIDE 20 MG: 10 INJECTION INTRAVENOUS at 08:02

## 2023-10-06 RX ADMIN — GLYCOPYRROLATE 0.8 MG: 0.2 INJECTION INTRAMUSCULAR; INTRAVENOUS at 08:56

## 2023-10-06 RX ADMIN — SUCCINYLCHOLINE CHLORIDE 140 MG: 20 INJECTION, SOLUTION INTRAMUSCULAR; INTRAVENOUS at 07:50

## 2023-10-06 RX ADMIN — PROCHLORPERAZINE EDISYLATE 10 MG: 5 INJECTION INTRAMUSCULAR; INTRAVENOUS at 12:32

## 2023-10-06 RX ADMIN — LIDOCAINE HYDROCHLORIDE 100 MG: 20 INJECTION, SOLUTION INFILTRATION; PERINEURAL at 07:50

## 2023-10-06 RX ADMIN — ROCURONIUM BROMIDE 20 MG: 10 INJECTION INTRAVENOUS at 08:25

## 2023-10-06 RX ADMIN — PILOCARPINE HYDROCHLORIDE OPHTHALMIC SOLUTION 1 DROP: 20 SOLUTION/ DROPS OPHTHALMIC at 05:55

## 2023-10-06 RX ADMIN — ONDANSETRON 4 MG: 2 INJECTION INTRAMUSCULAR; INTRAVENOUS at 07:55

## 2023-10-06 RX ADMIN — PILOCARPINE HYDROCHLORIDE OPHTHALMIC SOLUTION 1 DROP: 20 SOLUTION/ DROPS OPHTHALMIC at 05:44

## 2023-10-06 RX ADMIN — SUGAMMADEX 200 MG: 100 INJECTION, SOLUTION INTRAVENOUS at 09:00

## 2023-10-06 RX ADMIN — ROCURONIUM BROMIDE 10 MG: 10 INJECTION INTRAVENOUS at 07:50

## 2023-10-06 RX ADMIN — ONDANSETRON 4 MG: 2 INJECTION INTRAMUSCULAR; INTRAVENOUS at 11:45

## 2023-10-06 ASSESSMENT — ACTIVITIES OF DAILY LIVING (ADL)
NEEDS_ASSIST: NO
CHRONIC_PAIN_PRESENT: NO
SENSORY_SUPPORT_DEVICES: EYEGLASSES
ADL_SCORE: 12
HISTORY OF FALLING IN THE LAST YEAR (PRIOR TO ADMISSION): NO
ADL_SHORT_OF_BREATH: NO
ADL_BEFORE_ADMISSION: INDEPENDENT
RECENT_DECLINE_ADL: NO

## 2023-10-06 ASSESSMENT — PAIN SCALES - GENERAL
PAINLEVEL_OUTOF10: 5
PAINLEVEL_OUTOF10: 5
PAINLEVEL_OUTOF10: 0
PAINLEVEL_OUTOF10: 6
PAINLEVEL_OUTOF10: 5
PAINLEVEL_OUTOF10: 2
PAINLEVEL_OUTOF10: 4

## 2023-10-06 ASSESSMENT — COGNITIVE AND FUNCTIONAL STATUS - GENERAL
ARE YOU BLIND OR DO YOU HAVE SERIOUS DIFFICULTY SEEING, EVEN WHEN WEARING GLASSES: NO
ARE YOU DEAF OR DO YOU HAVE SERIOUS DIFFICULTY  HEARING: NO

## 2023-10-06 ASSESSMENT — PAIN SCALES - WONG BAKER: WONGBAKER_NUMERICALRESPONSE: 0

## 2023-10-07 VITALS
SYSTOLIC BLOOD PRESSURE: 157 MMHG | HEART RATE: 67 BPM | OXYGEN SATURATION: 94 % | TEMPERATURE: 96.6 F | DIASTOLIC BLOOD PRESSURE: 80 MMHG | BODY MASS INDEX: 29.1 KG/M2 | RESPIRATION RATE: 22 BRPM | HEIGHT: 68 IN | WEIGHT: 192 LBS

## 2023-10-20 LAB
BACTERIA EYE ANAEROBE+AEROBE CULT: ABNORMAL
GRAM STN SPEC: ABNORMAL

## 2024-03-29 NOTE — TELEPHONE ENCOUNTER
Please review. Rx failed/no protocol.    No Active/ Future labs pended    Future Appointments   Date Time Provider Department Center   5/8/2024 11:00 AM Chaim Avila MD [facility]       Requested Prescriptions   Pending Prescriptions Disp Refills    ROSUVASTATIN 5 MG Oral Tab [Pharmacy Med Name: ROSUVASTATIN CALCIUM 5 MG TAB] 90 tablet 1     Sig: TAKE 1 TABLET BY MOUTH EVERY DAY AT NIGHT       Cholesterol Medication Protocol Failed - 3/28/2024 12:29 AM        Failed - ALT < 80     Lab Results   Component Value Date    ALT 35 11/09/2022             Failed - ALT resulted within past year        Failed - Lipid panel within past 12 months     Lab Results   Component Value Date    CHOLEST 85 11/09/2022    TRIG 57 11/09/2022    HDL 19 (L) 11/09/2022    LDL 52 11/09/2022    VLDL 8 11/09/2022    NONHDLC 66 11/09/2022             Passed - In person appointment or virtual visit in the past 12 mos or appointment in next 3 mos     Recent Outpatient Visits              6 months ago Preop general physical exam    [facility] Chaim Avila MD    Office Visit    7 months ago Community acquired pneumonia, unspecified laterality    [facility] Chaim Avila MD    Office Visit    7 months ago Acute cough    [facility] Mendoza Moreira Agron B, MD    Office Visit    1 year ago Mixed hyperlipidemia    [facility] Ethan Cesar MD    Office Visit    [facility]          Future Appointments         Provider Department Appt Notes    In 1 month Chaim Avila MD [facility] last px 11/9/22 Policy advised                    Future Appointments         Provider Department Appt Notes    In 1 month  Chaim Avila MD [facility], [facility] last px 11/9/22 Policy advised          Recent Outpatient Visits              6 months [facility]    [facility]    7 months ago Community acquired pneumonia, unspecified laterality    [facility] Chaim Avila MD    Office Visit    7 months ago Acute cough    [facility] Agron B, [facility]    Office Visit    1 year ago Mixed hyperlipidemia    [facility] Ethan Cesar MD    Office Visit    1 year ago Annual physical exam    [facility], Ethan Cesar MD    Office Visit

## 2024-03-30 RX ORDER — ROSUVASTATIN CALCIUM 5 MG/1
5 TABLET, COATED ORAL NIGHTLY
Qty: 90 TABLET | Refills: 1 | Status: SHIPPED | OUTPATIENT
Start: 2024-03-30

## 2024-05-02 ENCOUNTER — OFFICE VISIT (OUTPATIENT)
Dept: INTERNAL MEDICINE CLINIC | Facility: CLINIC | Age: 69
End: 2024-05-02

## 2024-05-02 VITALS
OXYGEN SATURATION: 96 % | BODY MASS INDEX: 29.54 KG/M2 | HEART RATE: 66 BPM | HEIGHT: 68 IN | WEIGHT: 194.88 LBS | DIASTOLIC BLOOD PRESSURE: 70 MMHG | TEMPERATURE: 98 F | SYSTOLIC BLOOD PRESSURE: 124 MMHG

## 2024-05-02 DIAGNOSIS — I44.2 AV BLOCK, 3RD DEGREE (HCC): ICD-10-CM

## 2024-05-02 DIAGNOSIS — Z95.0 HISTORY OF PERMANENT CARDIAC PACEMAKER PLACEMENT: ICD-10-CM

## 2024-05-02 DIAGNOSIS — Z12.11 COLON CANCER SCREENING: ICD-10-CM

## 2024-05-02 DIAGNOSIS — E78.2 MIXED HYPERLIPIDEMIA: ICD-10-CM

## 2024-05-02 DIAGNOSIS — I10 PRIMARY HYPERTENSION: ICD-10-CM

## 2024-05-02 DIAGNOSIS — E55.9 VITAMIN D DEFICIENCY: ICD-10-CM

## 2024-05-02 DIAGNOSIS — N42.89 ASYMMETRIC PROSTATE: ICD-10-CM

## 2024-05-02 DIAGNOSIS — E04.2 MULTIPLE THYROID NODULES: ICD-10-CM

## 2024-05-02 DIAGNOSIS — R73.03 PREDIABETES: ICD-10-CM

## 2024-05-02 DIAGNOSIS — Z00.00 ANNUAL PHYSICAL EXAM: Primary | ICD-10-CM

## 2024-05-02 DIAGNOSIS — Z87.39 HISTORY OF GOUT: ICD-10-CM

## 2024-05-02 PROCEDURE — 99397 PER PM REEVAL EST PAT 65+ YR: CPT | Performed by: INTERNAL MEDICINE

## 2024-05-02 NOTE — PROGRESS NOTES
Subjective:     Patient ID: Marco Antonio Tucker is a 68 year old male.    Patient presents today for his annual physical.         History/Other:   Review of Systems   Constitutional: Negative.  Negative for appetite change, fever and unexpected weight change.   HENT: Negative.     Eyes: Negative.    Respiratory:  Positive for cough and wheezing. Negative for shortness of breath.    Cardiovascular:  Negative for chest pain, palpitations and leg swelling.        No pnd/orthopnea   Gastrointestinal: Negative.         No hematemesis   Genitourinary: Negative.         No nocturia   Allergic/Immunologic: Positive for food allergies. Negative for environmental allergies and immunocompromised state.   Neurological:  Negative for syncope.   Hematological: Negative.      Current Outpatient Medications   Medication Sig Dispense Refill    rosuvastatin 5 MG Oral Tab Take 1 tablet (5 mg total) by mouth nightly. 90 tablet 1    telmisartan 40 MG Oral Tab Take 1 tablet (40 mg total) by mouth nightly. FOR BLOOD PRESSURE 90 tablet 9    aspirin (ASPIRIN 81) 81 MG Oral Tab EC Take 1 tablet (81 mg total) by mouth in the morning. 90 tablet 9    amLODIPine 5 MG Oral Tab Take 1 tablet (5 mg total) by mouth at bedtime. FOR BLOOD PRESSURE. (Patient not taking: Reported on 8/30/2023) 90 tablet 9    albuterol 108 (90 Base) MCG/ACT Inhalation Aero Soln Inhale 2 puffs into the lungs every 6 (six) hours as needed for Wheezing or Shortness of Breath (cough). (Patient not taking: Reported on 9/1/2023) 3 each 9     Allergies:  Allergies   Allergen Reactions    Atarax [Hydroxyzine] FATIGUE    Shrimp OTHER (SEE COMMENTS)     Gout flare.        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)    Cervical spondylosis    Essential hypertension    Gout    High blood pressure    LBBB (left bundle branch block)    Osteoarthritis    Prediabetes    Unspecified essential hypertension      Past Surgical History:   Procedure Laterality Date    Cardiac  catheterization      2015 normal coronary arteries on angiogram    Cardiac pacemaker placement      Colonoscopy N/A 11/25/2017    Procedure: COLONOSCOPY;  Surgeon: Romeo Kim MD;  Location: [facility] ENDOSCOPY    Corneal transplant,lamellar      2023    Other surgical history  10/2015    pacemaker      Family History   Problem Relation Age of Onset    Diabetes Father     Diabetes Mother     Hypertension Mother     Heart Disorder Mother     Cancer Sister         breast cancer    Diabetes Brother       Social History:   Social History     Socioeconomic History    Marital status:    Tobacco Use    Smoking status: Never     Passive exposure: Never    Smokeless tobacco: Never   Vaping Use    Vaping status: Never Used   Substance and Sexual Activity    Alcohol use: No     Alcohol/week: 0.0 standard drinks of alcohol    Drug use: No   Other Topics Concern    Caffeine Concern Yes     Comment: coffee, 2 or more cups daily    Exercise No    Pt has a pacemaker Yes    Pt has a defibrillator No    Reaction to local anesthetic No   Social History Narrative    The patient does not use an assistive device..      The patient does live in a home with stairs.        Lives with wife     Drives a taxi for work    Born in [facility]        Objective:   Physical Exam  Constitutional:       General: He is not in acute distress.     Appearance: He is well-developed. He is not ill-appearing, toxic-appearing or diaphoretic.   HENT:      Head: Normocephalic and atraumatic.      Right Ear: Tympanic membrane, ear canal and external ear normal.      Left Ear: Tympanic membrane, ear canal and external ear normal.      Nose: Nose normal.      Mouth/Throat:      Pharynx: No oropharyngeal exudate.   Eyes:      General: No scleral icterus.        Right eye: No discharge.         Left eye: No discharge.      Conjunctiva/sclera: Conjunctivae normal.      Pupils: Pupils are equal, round, and reactive to light.   Neck:      Thyroid: No  thyromegaly.      Vascular: No carotid bruit or JVD.   Cardiovascular:      Rate and Rhythm: Normal rate and regular rhythm.      Pulses: Normal pulses.      Heart sounds: Normal heart sounds. No murmur heard.     No friction rub. No gallop.   Pulmonary:      Effort: Pulmonary effort is normal. No respiratory distress.      Breath sounds: Normal breath sounds. No wheezing or rales.   Abdominal:      General: Abdomen is flat. Bowel sounds are normal. There is no distension.      Palpations: Abdomen is soft. There is no mass.      Tenderness: There is no abdominal tenderness. There is no guarding or rebound.   Genitourinary:     Rectum: Normal. No mass, tenderness, anal fissure or external hemorrhoid. Normal anal tone.      Comments: Prostate was assymetric, right lobe appears to be bigger than left lobe; no nodules or induration; no tenderness.   Musculoskeletal:         General: Normal range of motion.      Cervical back: Normal range of motion and neck supple. No rigidity or tenderness.      Right lower leg: No edema.      Left lower leg: No edema.   Lymphadenopathy:      Cervical: No cervical adenopathy.   Skin:     General: Skin is warm and dry.      Coloration: Skin is not jaundiced or pale.      Findings: No rash.   Neurological:      Mental Status: He is alert and oriented to person, place, and time.   Psychiatric:         Mood and Affect: Mood normal.         Assessment & Plan:   (Z00.00) Annual physical exam  (primary encounter diagnosis)  Plan: CBC With Differential With Platelet, Comp         Metabolic Panel (14), Lipid Panel, Uric Acid,         PSA Total, Screen, Hemoglobin A1C, TSH W Reflex        To Free T4        Routine labs ordered; pt advised to get his latest covid booster and RSV vaccine.     (I10) Primary hypertension  Plan: bp controlled. Cpm.     (E78.2) Mixed hyperlipidemia  Plan: check lipid panel, continue with low chol diet and chol med.     (I44.2) AV block, 3rd degree (HCC)  Plan: pt had  PPM placement before,  pt continues to ffup with his cardiologist.     (Z95.0) History of permanent cardiac pacemaker placement  Plan: see above.     (R73.03) Prediabetes  Plan: check fbg and A1c.     (Z87.39) History of gout  Plan: check uric acid level.     (E55.9) Vitamin D deficiency  Plan: check vit D.         (Z12.11) Colon cancer screening  Plan: Gastro Referral - In Network        Pt overdue for his colonoscopy as d/w pt ; given referral for his GI.     (N42.89) Asymmetric prostate  Plan: Urology Referral - [place] ([facility])        Pt had assymetric prostate with right lobe bigger which I had d/w pt; he needs to see  urologist for further eval; pt given referral.        Orders Placed This Encounter   Procedures    CBC With Differential With Platelet    Comp Metabolic Panel (14)    Lipid Panel    Uric Acid    PSA Total, Screen    Hemoglobin A1C    TSH W Reflex To Free T4       Meds This Visit:  Requested Prescriptions      No prescriptions requested or ordered in this encounter       Imaging & Referrals:  [facility] - INTERNAL

## 2024-05-03 ENCOUNTER — LAB ENCOUNTER (OUTPATIENT)
Dept: LAB | Age: 69
End: 2024-05-03
Attending: INTERNAL MEDICINE
Payer: COMMERCIAL

## 2024-05-03 DIAGNOSIS — Z00.00 ANNUAL PHYSICAL EXAM: ICD-10-CM

## 2024-05-03 LAB
ALBUMIN SERPL-MCNC: 4.5 G/DL (ref 3.2–4.8)
ALBUMIN/GLOB SERPL: 1.9 {RATIO} (ref 1–2)
ALP LIVER SERPL-CCNC: 75 U/L
ALT SERPL-CCNC: 26 U/L
ANION GAP SERPL CALC-SCNC: 3 MMOL/L (ref 0–18)
AST SERPL-CCNC: 25 U/L (ref ?–34)
BASOPHILS # BLD AUTO: 0.05 X10(3) UL (ref 0–0.2)
BASOPHILS NFR BLD AUTO: 0.6 %
BILIRUB SERPL-MCNC: 0.5 MG/DL (ref 0.2–1.1)
BUN BLD-MCNC: 12 MG/DL (ref 9–23)
BUN/CREAT SERPL: 10.3 (ref 10–20)
CALCIUM BLD-MCNC: 9.3 MG/DL (ref 8.7–10.4)
CHLORIDE SERPL-SCNC: 105 MMOL/L (ref 98–112)
CHOLEST SERPL-MCNC: 73 MG/DL (ref ?–200)
CO2 SERPL-SCNC: 29 MMOL/L (ref 21–32)
COMPLEXED PSA SERPL-MCNC: 3.23 NG/ML (ref ?–4)
CREAT BLD-MCNC: 1.16 MG/DL
DEPRECATED RDW RBC AUTO: 40.3 FL (ref 35.1–46.3)
EGFRCR SERPLBLD CKD-EPI 2021: 68 ML/MIN/1.73M2 (ref 60–?)
EOSINOPHIL # BLD AUTO: 2.76 X10(3) UL (ref 0–0.7)
EOSINOPHIL NFR BLD AUTO: 33.5 %
ERYTHROCYTE [DISTWIDTH] IN BLOOD BY AUTOMATED COUNT: 13 % (ref 11–15)
EST. AVERAGE GLUCOSE BLD GHB EST-MCNC: 140 MG/DL (ref 68–126)
FASTING PATIENT LIPID ANSWER: YES
FASTING STATUS PATIENT QL REPORTED: YES
GLOBULIN PLAS-MCNC: 2.4 G/DL (ref 2–3.5)
GLUCOSE BLD-MCNC: 124 MG/DL (ref 70–99)
HBA1C MFR BLD: 6.5 % (ref ?–5.7)
HCT VFR BLD AUTO: 44.3 %
HDLC SERPL-MCNC: 18 MG/DL (ref 40–59)
HGB BLD-MCNC: 15 G/DL
IMM GRANULOCYTES # BLD AUTO: 0.02 X10(3) UL (ref 0–1)
IMM GRANULOCYTES NFR BLD: 0.2 %
LDLC SERPL CALC-MCNC: 43 MG/DL (ref ?–100)
LYMPHOCYTES # BLD AUTO: 2.08 X10(3) UL (ref 1–4)
LYMPHOCYTES NFR BLD AUTO: 25.2 %
MCH RBC QN AUTO: 29 PG (ref 26–34)
MCHC RBC AUTO-ENTMCNC: 33.9 G/DL (ref 31–37)
MCV RBC AUTO: 85.7 FL
MONOCYTES # BLD AUTO: 0.44 X10(3) UL (ref 0.1–1)
MONOCYTES NFR BLD AUTO: 5.3 %
NEUTROPHILS # BLD AUTO: 2.89 X10 (3) UL (ref 1.5–7.7)
NEUTROPHILS # BLD AUTO: 2.89 X10(3) UL (ref 1.5–7.7)
NEUTROPHILS NFR BLD AUTO: 35.2 %
NONHDLC SERPL-MCNC: 55 MG/DL (ref ?–130)
OSMOLALITY SERPL CALC.SUM OF ELEC: 285 MOSM/KG (ref 275–295)
PLATELET # BLD AUTO: 219 10(3)UL (ref 150–450)
POTASSIUM SERPL-SCNC: 4.4 MMOL/L (ref 3.5–5.1)
PROT SERPL-MCNC: 6.9 G/DL (ref 5.7–8.2)
RBC # BLD AUTO: 5.17 X10(6)UL
SODIUM SERPL-SCNC: 137 MMOL/L (ref 136–145)
TRIGL SERPL-MCNC: 46 MG/DL (ref 30–149)
TSI SER-ACNC: 1.14 MIU/ML (ref 0.55–4.78)
URATE SERPL-MCNC: 9.2 MG/DL
VLDLC SERPL CALC-MCNC: 6 MG/DL (ref 0–30)
WBC # BLD AUTO: 8.2 X10(3) UL (ref 4–11)

## 2024-05-03 PROCEDURE — 83036 HEMOGLOBIN GLYCOSYLATED A1C: CPT

## 2024-05-03 PROCEDURE — 80061 LIPID PANEL: CPT

## 2024-05-03 PROCEDURE — 36415 COLL VENOUS BLD VENIPUNCTURE: CPT

## 2024-05-03 PROCEDURE — 80053 COMPREHEN METABOLIC PANEL: CPT

## 2024-05-03 PROCEDURE — 84443 ASSAY THYROID STIM HORMONE: CPT

## 2024-05-03 PROCEDURE — 84550 ASSAY OF BLOOD/URIC ACID: CPT

## 2024-05-03 PROCEDURE — 85025 COMPLETE CBC W/AUTO DIFF WBC: CPT

## 2024-05-03 PROCEDURE — 85060 BLOOD SMEAR INTERPRETATION: CPT

## 2024-05-04 ENCOUNTER — TELEPHONE (OUTPATIENT)
Dept: INTERNAL MEDICINE CLINIC | Facility: CLINIC | Age: 69
End: 2024-05-04

## 2024-05-04 DIAGNOSIS — E11.9 CONTROLLED TYPE 2 DIABETES MELLITUS WITHOUT COMPLICATION, WITHOUT LONG-TERM CURRENT USE OF INSULIN (HCC): Primary | ICD-10-CM

## 2024-05-20 ENCOUNTER — TELEPHONE (OUTPATIENT)
Dept: INTERNAL MEDICINE CLINIC | Facility: CLINIC | Age: 69
End: 2024-05-20

## 2024-05-20 ENCOUNTER — HOSPITAL ENCOUNTER (OUTPATIENT)
Age: 69
End: 2024-05-20
Attending: OPHTHALMOLOGY | Admitting: OPHTHALMOLOGY

## 2024-05-20 NOTE — TELEPHONE ENCOUNTER
Patient is scheduled for Cataract surgery on 5/24. Needs pre op clearance but no openings this week. Please advise.

## 2024-05-23 DIAGNOSIS — I10 HYPERTENSION GOAL BP (BLOOD PRESSURE) < 130/80: ICD-10-CM

## 2024-05-27 RX ORDER — TELMISARTAN 40 MG/1
40 TABLET ORAL NIGHTLY
Qty: 90 TABLET | Refills: 3 | Status: SHIPPED | OUTPATIENT
Start: 2024-05-27

## 2024-05-27 NOTE — TELEPHONE ENCOUNTER
Refill passed per [facility] protocols.    Requested Prescriptions   Pending Prescriptions Disp Refills    TELMISARTAN 40 MG Oral Tab [Pharmacy Med Name: TELMISARTAN 40 MG TABLET] 90 tablet 9     Sig: Take 1 tablet (40 mg total) by mouth nightly. FOR BLOOD PRESSURE       Hypertension Medications Protocol Passed - 5/23/2024 12:04 AM        Passed - CMP or BMP in past 12 months        Passed - Last BP reading less than 140/90     BP Readings from Last 1 Encounters:   05/02/24 124/70               Passed - In person appointment or virtual visit in the past 12 mos or appointment in next 3 mos     Recent Outpatient Visits              3 weeks ago Annual physical exam    [facility] Chaim Naranjo MD    Office Visit    8 months ago Preop general physical exam    [facility], Chaim Naranjo MD    Office Visit    8 months ago Community acquired pneumonia, unspecified laterality    [facility], Chaim Naranjo MD    Office Visit    9 months ago [facility] cough    [facility]    Office Visit    1 year ago Mixed hyperlipidemia    [facility], Ethan Cesar MD    Office Visit                      Passed - EGFRCR or GFRNAA > 50     GFR Evaluation  EGFRCR: 68 , resulted on 5/3/2024

## 2024-07-15 ENCOUNTER — TELEPHONE (OUTPATIENT)
Dept: INTERNAL MEDICINE CLINIC | Facility: CLINIC | Age: 69
End: 2024-07-15

## 2024-07-15 NOTE — TELEPHONE ENCOUNTER
Per patient, he is going to have cataract surgery on 07/26/2024 and he needs to come and see doctor for pre-op. No available appointment with Dr Avila.  Please advise. Thank you.    Please reply to pool: EM CC IM FM ALG RHE

## 2024-07-16 ENCOUNTER — LAB ENCOUNTER (OUTPATIENT)
Dept: LAB | Age: 69
End: 2024-07-16
Attending: INTERNAL MEDICINE
Payer: COMMERCIAL

## 2024-07-16 ENCOUNTER — EKG ENCOUNTER (OUTPATIENT)
Dept: LAB | Age: 69
End: 2024-07-16
Attending: INTERNAL MEDICINE
Payer: COMMERCIAL

## 2024-07-16 ENCOUNTER — OFFICE VISIT (OUTPATIENT)
Dept: INTERNAL MEDICINE CLINIC | Facility: CLINIC | Age: 69
End: 2024-07-16

## 2024-07-16 VITALS
SYSTOLIC BLOOD PRESSURE: 120 MMHG | HEART RATE: 80 BPM | DIASTOLIC BLOOD PRESSURE: 74 MMHG | OXYGEN SATURATION: 96 % | TEMPERATURE: 97 F | BODY MASS INDEX: 29.61 KG/M2 | HEIGHT: 68 IN | WEIGHT: 195.38 LBS

## 2024-07-16 DIAGNOSIS — I44.2 AV BLOCK, 3RD DEGREE (HCC): ICD-10-CM

## 2024-07-16 DIAGNOSIS — H25.9 SENILE CATARACT OF LEFT EYE, UNSPECIFIED AGE-RELATED CATARACT TYPE: ICD-10-CM

## 2024-07-16 DIAGNOSIS — Z95.0 HISTORY OF PERMANENT CARDIAC PACEMAKER PLACEMENT: ICD-10-CM

## 2024-07-16 DIAGNOSIS — E11.9 CONTROLLED TYPE 2 DIABETES MELLITUS WITHOUT COMPLICATION, WITHOUT LONG-TERM CURRENT USE OF INSULIN (HCC): ICD-10-CM

## 2024-07-16 DIAGNOSIS — Z01.818 PREOP GENERAL PHYSICAL EXAM: ICD-10-CM

## 2024-07-16 DIAGNOSIS — E78.2 MIXED HYPERLIPIDEMIA: ICD-10-CM

## 2024-07-16 DIAGNOSIS — I10 PRIMARY HYPERTENSION: ICD-10-CM

## 2024-07-16 DIAGNOSIS — Z01.818 PREOP GENERAL PHYSICAL EXAM: Primary | ICD-10-CM

## 2024-07-16 LAB
CREAT UR-SCNC: 30.4 MG/DL
MICROALBUMIN UR-MCNC: <0.3 MG/DL

## 2024-07-16 PROCEDURE — 99214 OFFICE O/P EST MOD 30 MIN: CPT | Performed by: INTERNAL MEDICINE

## 2024-07-16 PROCEDURE — 93010 ELECTROCARDIOGRAM REPORT: CPT | Performed by: INTERNAL MEDICINE

## 2024-07-16 PROCEDURE — 82570 ASSAY OF URINE CREATININE: CPT

## 2024-07-16 PROCEDURE — 93005 ELECTROCARDIOGRAM TRACING: CPT

## 2024-07-16 PROCEDURE — 82043 UR ALBUMIN QUANTITATIVE: CPT

## 2024-07-16 NOTE — PROGRESS NOTES
Subjective:     Patient ID: Marco Antonio Tucker is a 68 year old male.    Patient presents today for preop medical clearance as requested by Dr Dominick rojas for scheduled left eye cataract surgery to be done on July 22, 2024.           History/Other:   Review of Systems   Constitutional: Negative.    Eyes:  Positive for visual disturbance.   Respiratory: Negative.          No hemoptysis   Cardiovascular: Negative.  Negative for chest pain, palpitations and leg swelling.        Able to flight stairs at home with no chest pains  No PND/orthopnea   Gastrointestinal: Negative.    Genitourinary: Negative.    Allergic/Immunologic: Negative for immunocompromised state.   Neurological:  Negative for syncope.   Hematological: Negative.      Current Outpatient Medications   Medication Sig Dispense Refill    telmisartan 40 MG Oral Tab Take 1 tablet (40 mg total) by mouth nightly. FOR BLOOD PRESSURE 90 tablet 3    rosuvastatin 5 MG Oral Tab Take 1 tablet (5 mg total) by mouth nightly. 90 tablet 1    aspirin (ASPIRIN 81) 81 MG Oral Tab EC Take 1 tablet (81 mg total) by mouth in the morning. 90 tablet 9    amLODIPine 5 MG Oral Tab Take 1 tablet (5 mg total) by mouth at bedtime. FOR BLOOD PRESSURE. (Patient not taking: Reported on 8/30/2023) 90 tablet 9    albuterol 108 (90 Base) MCG/ACT Inhalation Aero Soln Inhale 2 puffs into the lungs every 6 (six) hours as needed for Wheezing or Shortness of Breath (cough). (Patient not taking: Reported on 9/1/2023) 3 each 9     Allergies:  Allergies   Allergen Reactions    Atarax [Hydroxyzine] FATIGUE    Shrimp OTHER (SEE COMMENTS)     Gout flare.        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)    Cervical spondylosis    Essential hypertension    Gout    High blood pressure    LBBB (left bundle branch block)    Osteoarthritis    Prediabetes    Unspecified essential hypertension      Past Surgical History:   Procedure Laterality Date    Cardiac catheterization      2015 normal  coronary arteries on angiogram    Cardiac pacemaker placement      Colonoscopy N/A 11/25/2017    Procedure: COLONOSCOPY;  Surgeon: Romeo Kim MD;  Location: [facility]    Corneal transplant,lamellar      2023    Other surgical history  10/2015    pacemaker      Family History   Problem Relation Age of Onset    Diabetes Father     Diabetes Mother     Hypertension Mother     Heart Disorder Mother     Cancer Sister         breast cancer    Diabetes Brother       Social History:   Social History     Socioeconomic History    Marital status:    Tobacco Use    Smoking status: Never     Passive exposure: Never    Smokeless tobacco: Never   Vaping Use    Vaping status: Never Used   Substance and Sexual Activity    Alcohol use: No     Alcohol/week: 0.0 standard drinks of alcohol    Drug use: No   Other Topics Concern    Caffeine Concern Yes     Comment: coffee, 2 or more cups daily    Exercise No    Pt has a pacemaker Yes    Pt has a defibrillator No    Reaction to local anesthetic No   Social History Narrative    The patient does not use an assistive device..      The patient does live in a home with stairs.        Lives with wife     Drives a taxi for work    Born in [facility]        Objective:   Physical Exam  Constitutional:       General: He is not in acute distress.     Appearance: He is well-developed. He is not ill-appearing, toxic-appearing or diaphoretic.   HENT:      Head: Normocephalic and atraumatic.      Right Ear: External ear normal.      Left Ear: External ear normal.      Nose: Nose normal.      Mouth/Throat:      Pharynx: No oropharyngeal exudate.   Eyes:      General:         Right eye: No discharge.         Left eye: No discharge.      Conjunctiva/sclera: Conjunctivae normal.      Pupils: Pupils are equal, round, and reactive to light.   Neck:      Vascular: No JVD.   Cardiovascular:      Rate and Rhythm: Normal rate and regular rhythm.      Pulses:           Dorsalis pedis pulses  are 3+ on the right side and 3+ on the left side.        Posterior tibial pulses are 3+ on the right side and 3+ on the left side.      Heart sounds: Normal heart sounds. No murmur heard.  Pulmonary:      Effort: Pulmonary effort is normal. No respiratory distress.      Breath sounds: Normal breath sounds. No wheezing or rales.   Abdominal:      General: Bowel sounds are normal. There is no distension.      Palpations: Abdomen is soft. There is no mass.      Tenderness: There is no abdominal tenderness. There is no guarding or rebound.   Musculoskeletal:         General: No tenderness. Normal range of motion.      Cervical back: Normal range of motion and neck supple. No rigidity or tenderness.      Right lower leg: No edema.      Left lower leg: No edema.   Feet:      Right foot:      Protective Sensation: 10 sites tested.  10 sites sensed.      Skin integrity: Dry skin present. No skin breakdown.      Toenail Condition: Right toenails are normal.      Left foot:      Protective Sensation: 10 sites tested.  10 sites sensed.      Skin integrity: Dry skin present. No skin breakdown.      Toenail Condition: Left toenails are normal.   Lymphadenopathy:      Cervical: No cervical adenopathy.   Skin:     General: Skin is warm and dry.      Findings: No rash.   Neurological:      Mental Status: He is alert and oriented to person, place, and time.         Assessment & Plan:   (Z01.818) Preop general physical exam  (primary encounter diagnosis)  Plan: EKG 12 Lead to be performed at [facility]        Pt has no active cardiac conditions and has good functional capacity. His cataract surgery is low risk surgery. He may proceed with his left cataract surgery as scheduled.     (H25.9) Senile cataract of left eye, unspecified age-related cataract type  Plan: as per recommendation of Dr Rivera.     (I44.2) AV block, 3rd degree (HCC)  Plan: pt had been treated with PPM placement before. Had seen cardiologist  Dr Fermin last Sept 2023 and pacemaker functioning properly.     (Z95.0) History of permanent cardiac pacemaker placement  Plan: see above.     (E11.9) Controlled type 2 diabetes mellitus without complication, without long-term current use of insulin (HCC)  Plan: recent A1c at 6.5 so dm remains controlled on diet alone. CPM.     (I10) Primary hypertension  Plan: controlled with current bp med. Cpm.     (E78.2) Mixed hyperlipidemia  Plan: controlled with chol statin med. Cpm.        No orders of the defined types were placed in this encounter.      Meds This Visit:  Requested Prescriptions      No prescriptions requested or ordered in this encounter       Imaging & Referrals:  None

## 2024-07-17 LAB
ATRIAL RATE: 64 BPM
P AXIS: 32 DEGREES
P-R INTERVAL: 224 MS
Q-T INTERVAL: 474 MS
QRS DURATION: 192 MS
QTC CALCULATION (BEZET): 489 MS
R AXIS: -72 DEGREES
T AXIS: 106 DEGREES
VENTRICULAR RATE: 64 BPM

## 2024-07-18 ENCOUNTER — TELEPHONE (OUTPATIENT)
Dept: INTERNAL MEDICINE CLINIC | Facility: CLINIC | Age: 69
End: 2024-07-18

## 2024-07-24 ASSESSMENT — ACTIVITIES OF DAILY LIVING (ADL)
ADL_BEFORE_ADMISSION: INDEPENDENT
SENSORY_SUPPORT_DEVICES: CONTACTS
NEEDS_ASSIST: NO
HISTORY OF FALLING IN THE LAST YEAR (PRIOR TO ADMISSION): NO
ADL_SHORT_OF_BREATH: NO
RECENT_DECLINE_ADL: NO
ADL_SCORE: 12

## 2024-07-26 ENCOUNTER — HOSPITAL ENCOUNTER (OUTPATIENT)
Age: 69
Discharge: HOME | End: 2024-07-26
Attending: OPHTHALMOLOGY | Admitting: OPHTHALMOLOGY

## 2024-07-26 ENCOUNTER — ANESTHESIA (OUTPATIENT)
Dept: SURGERY | Age: 69
End: 2024-07-26

## 2024-07-26 ENCOUNTER — ANESTHESIA EVENT (OUTPATIENT)
Dept: SURGERY | Age: 69
End: 2024-07-26

## 2024-07-26 VITALS
OXYGEN SATURATION: 94 % | HEART RATE: 72 BPM | WEIGHT: 192 LBS | DIASTOLIC BLOOD PRESSURE: 72 MMHG | RESPIRATION RATE: 17 BRPM | SYSTOLIC BLOOD PRESSURE: 118 MMHG | HEIGHT: 68 IN | BODY MASS INDEX: 29.1 KG/M2 | TEMPERATURE: 97 F

## 2024-07-26 LAB — GLUCOSE BLDC GLUCOMTR-MCNC: 122 MG/DL (ref 70–99)

## 2024-07-26 PROCEDURE — 10006026 HB SUPPLY 276: Performed by: OPHTHALMOLOGY

## 2024-07-26 PROCEDURE — 13000006 HB ANESTHESIA MAC S/U + 1ST 15 MIN: Performed by: OPHTHALMOLOGY

## 2024-07-26 PROCEDURE — 13000036 HB COMPLEX  CASE S/U + 1ST 15 MIN: Performed by: OPHTHALMOLOGY

## 2024-07-26 PROCEDURE — 13000001 HB PHASE II RECOVERY EA 30 MINUTES: Performed by: OPHTHALMOLOGY

## 2024-07-26 PROCEDURE — 10002803 HB RX 637

## 2024-07-26 PROCEDURE — 13000007 HB ANESTHESIA MAC EA ADD MINUTE: Performed by: OPHTHALMOLOGY

## 2024-07-26 PROCEDURE — V2632 POST CHMBR INTRAOCULAR LENS: HCPCS | Performed by: OPHTHALMOLOGY

## 2024-07-26 PROCEDURE — 13000037 HB COMPLEX CASE EACH ADD MINUTE: Performed by: OPHTHALMOLOGY

## 2024-07-26 PROCEDURE — 10002803 HB RX 637: Performed by: OPHTHALMOLOGY

## 2024-07-26 PROCEDURE — 10006023 HB SUPPLY 272: Performed by: OPHTHALMOLOGY

## 2024-07-26 PROCEDURE — 10002801 HB RX 250 W/O HCPCS: Performed by: OPHTHALMOLOGY

## 2024-07-26 PROCEDURE — 10002800 HB RX 250 W HCPCS: Performed by: OPHTHALMOLOGY

## 2024-07-26 PROCEDURE — 10004651 HB RX, NO CHARGE ITEM: Performed by: OPHTHALMOLOGY

## 2024-07-26 PROCEDURE — 10002807 HB RX 258: Performed by: OPHTHALMOLOGY

## 2024-07-26 PROCEDURE — 10002801 HB RX 250 W/O HCPCS: Performed by: ANESTHESIOLOGY

## 2024-07-26 PROCEDURE — 82962 GLUCOSE BLOOD TEST: CPT

## 2024-07-26 PROCEDURE — 10002800 HB RX 250 W HCPCS: Performed by: ANESTHESIOLOGY

## 2024-07-26 DEVICE — TECNIS SIMPLICITY TECNIS EYHANCE U 18.5D
Type: IMPLANTABLE DEVICE | Site: EYE | Status: FUNCTIONAL
Brand: TECNIS SIMPLICITY

## 2024-07-26 RX ORDER — CYCLOPENTOLATE HYDROCHLORIDE 10 MG/ML
1 SOLUTION/ DROPS OPHTHALMIC
Status: COMPLETED | OUTPATIENT
Start: 2024-07-26 | End: 2024-07-26

## 2024-07-26 RX ORDER — KETOROLAC TROMETHAMINE 5 MG/ML
1 SOLUTION OPHTHALMIC 4 TIMES DAILY
Status: DISCONTINUED | OUTPATIENT
Start: 2024-07-26 | End: 2024-07-26 | Stop reason: HOSPADM

## 2024-07-26 RX ORDER — CHLORHEXIDINE GLUCONATE ORAL RINSE 1.2 MG/ML
15 SOLUTION DENTAL ONCE
Status: COMPLETED | OUTPATIENT
Start: 2024-07-26 | End: 2024-07-26

## 2024-07-26 RX ORDER — TETRACAINE HYDROCHLORIDE 5 MG/ML
SOLUTION OPHTHALMIC PRN
Status: DISCONTINUED | OUTPATIENT
Start: 2024-07-26 | End: 2024-07-26 | Stop reason: HOSPADM

## 2024-07-26 RX ORDER — TROPICAMIDE 10 MG/ML
1 SOLUTION/ DROPS OPHTHALMIC
Status: COMPLETED | OUTPATIENT
Start: 2024-07-26 | End: 2024-07-26

## 2024-07-26 RX ORDER — LIDOCAINE HYDROCHLORIDE 20 MG/ML
INJECTION, SOLUTION INFILTRATION; PERINEURAL PRN
Status: DISCONTINUED | OUTPATIENT
Start: 2024-07-26 | End: 2024-07-26

## 2024-07-26 RX ORDER — ACETAMINOPHEN 325 MG/1
650 TABLET ORAL EVERY 4 HOURS PRN
Status: DISCONTINUED | OUTPATIENT
Start: 2024-07-26 | End: 2024-07-26 | Stop reason: HOSPADM

## 2024-07-26 RX ORDER — PHENYLEPHRINE HYDROCHLORIDE 100 MG/ML
1 SOLUTION/ DROPS OPHTHALMIC
Status: COMPLETED | OUTPATIENT
Start: 2024-07-26 | End: 2024-07-26

## 2024-07-26 RX ORDER — PREDNISOLONE ACETATE 10 MG/ML
SUSPENSION/ DROPS OPHTHALMIC PRN
Status: DISCONTINUED | OUTPATIENT
Start: 2024-07-26 | End: 2024-07-26 | Stop reason: HOSPADM

## 2024-07-26 RX ORDER — LIDOCAINE HYDROCHLORIDE 10 MG/ML
INJECTION, SOLUTION INFILTRATION; PERINEURAL PRN
Status: DISCONTINUED | OUTPATIENT
Start: 2024-07-26 | End: 2024-07-26 | Stop reason: HOSPADM

## 2024-07-26 RX ORDER — PREDNISOLONE ACETATE 10 MG/ML
1 SUSPENSION/ DROPS OPHTHALMIC 4 TIMES DAILY
Status: DISCONTINUED | OUTPATIENT
Start: 2024-07-26 | End: 2024-07-26 | Stop reason: HOSPADM

## 2024-07-26 RX ORDER — SODIUM CHLORIDE 9 MG/ML
INJECTION, SOLUTION INTRAVENOUS CONTINUOUS
Status: DISCONTINUED | OUTPATIENT
Start: 2024-07-26 | End: 2024-07-26 | Stop reason: HOSPADM

## 2024-07-26 RX ORDER — OFLOXACIN 3 MG/ML
1 SOLUTION/ DROPS OPHTHALMIC
Status: COMPLETED | OUTPATIENT
Start: 2024-07-26 | End: 2024-07-26

## 2024-07-26 RX ORDER — MIDAZOLAM HYDROCHLORIDE 1 MG/ML
INJECTION, SOLUTION INTRAMUSCULAR; INTRAVENOUS PRN
Status: DISCONTINUED | OUTPATIENT
Start: 2024-07-26 | End: 2024-07-26

## 2024-07-26 RX ORDER — OFLOXACIN 3 MG/ML
SOLUTION AURICULAR (OTIC) PRN
Status: DISCONTINUED | OUTPATIENT
Start: 2024-07-26 | End: 2024-07-26 | Stop reason: HOSPADM

## 2024-07-26 RX ORDER — 0.9 % SODIUM CHLORIDE 0.9 %
2 VIAL (ML) INJECTION EVERY 12 HOURS SCHEDULED
Status: DISCONTINUED | OUTPATIENT
Start: 2024-07-26 | End: 2024-07-26 | Stop reason: HOSPADM

## 2024-07-26 RX ADMIN — TROPICAMIDE 1 DROP: 10 SOLUTION/ DROPS OPHTHALMIC at 08:58

## 2024-07-26 RX ADMIN — OFLOXACIN 1 DROP: 3 SOLUTION/ DROPS OPHTHALMIC at 08:47

## 2024-07-26 RX ADMIN — TROPICAMIDE 1 DROP: 10 SOLUTION/ DROPS OPHTHALMIC at 08:47

## 2024-07-26 RX ADMIN — CYCLOPENTOLATE HYDROCHLORIDE 1 DROP: 10 SOLUTION/ DROPS OPHTHALMIC at 08:47

## 2024-07-26 RX ADMIN — TROPICAMIDE 1 DROP: 10 SOLUTION/ DROPS OPHTHALMIC at 09:09

## 2024-07-26 RX ADMIN — OFLOXACIN 1 DROP: 3 SOLUTION/ DROPS OPHTHALMIC at 09:09

## 2024-07-26 RX ADMIN — CYCLOPENTOLATE HYDROCHLORIDE 1 DROP: 10 SOLUTION/ DROPS OPHTHALMIC at 09:09

## 2024-07-26 RX ADMIN — SODIUM CHLORIDE: 9 INJECTION, SOLUTION INTRAVENOUS at 09:04

## 2024-07-26 RX ADMIN — FENTANYL CITRATE 50 MCG: 50 INJECTION INTRAMUSCULAR; INTRAVENOUS at 10:57

## 2024-07-26 RX ADMIN — PROPOFOL INJECTABLE EMULSION 100 MCG/KG/MIN: 10 INJECTION, EMULSION INTRAVENOUS at 10:59

## 2024-07-26 RX ADMIN — ACETAMINOPHEN 650 MG: 325 TABLET ORAL at 12:11

## 2024-07-26 RX ADMIN — PHENYLEPHRINE HYDROCHLORIDE 1 DROP: 100 SOLUTION/ DROPS OPHTHALMIC at 09:09

## 2024-07-26 RX ADMIN — PHENYLEPHRINE HYDROCHLORIDE 1 DROP: 100 SOLUTION/ DROPS OPHTHALMIC at 08:47

## 2024-07-26 RX ADMIN — MIDAZOLAM HYDROCHLORIDE 2 MG: 1 INJECTION, SOLUTION INTRAMUSCULAR; INTRAVENOUS at 10:57

## 2024-07-26 RX ADMIN — PHENYLEPHRINE HYDROCHLORIDE 1 DROP: 100 SOLUTION/ DROPS OPHTHALMIC at 08:58

## 2024-07-26 RX ADMIN — LIDOCAINE HYDROCHLORIDE 2 ML: 20 INJECTION, SOLUTION INFILTRATION; PERINEURAL at 10:59

## 2024-07-26 RX ADMIN — OFLOXACIN 1 DROP: 3 SOLUTION/ DROPS OPHTHALMIC at 08:58

## 2024-07-26 RX ADMIN — CHLORHEXIDINE GLUCONATE 15 ML: 1.2 RINSE ORAL at 08:47

## 2024-07-26 RX ADMIN — CYCLOPENTOLATE HYDROCHLORIDE 1 DROP: 10 SOLUTION/ DROPS OPHTHALMIC at 08:58

## 2024-07-26 RX ADMIN — OFLOXACIN 1 DROP: 3 SOLUTION/ DROPS OPHTHALMIC at 09:20

## 2024-07-26 RX ADMIN — FENTANYL CITRATE 50 MCG: 50 INJECTION INTRAMUSCULAR; INTRAVENOUS at 11:17

## 2024-07-26 SDOH — SOCIAL STABILITY: SOCIAL INSECURITY: RISK FACTORS: AGE

## 2024-07-26 SDOH — SOCIAL STABILITY: SOCIAL INSECURITY: RISK FACTORS: SLEEP APNEA

## 2024-07-26 SDOH — SOCIAL STABILITY: SOCIAL INSECURITY: RISK FACTORS: HEART DISEASE

## 2024-07-26 ASSESSMENT — PAIN SCALES - GENERAL
PAINLEVEL_OUTOF10: 5
PAINLEVEL_OUTOF10: 4
PAINLEVEL_OUTOF10: 0

## 2024-07-26 ASSESSMENT — ENCOUNTER SYMPTOMS: EXERCISE TOLERANCE: GOOD (>4 METS)

## 2024-09-19 ENCOUNTER — TELEPHONE (OUTPATIENT)
Dept: INTERNAL MEDICINE CLINIC | Facility: CLINIC | Age: 69
End: 2024-09-19

## 2024-09-19 NOTE — TELEPHONE ENCOUNTER
Spoke with patient, verified , pre op appointment scheduled on  at 11:30am, patient verbalized understanding. Patient is having left eye surgery on 10/11 with Dr Miguel Tan at [facility].

## 2024-09-19 NOTE — TELEPHONE ENCOUNTER
Patient called and is requesting a pre op appointment will be having eye surgery on 10/11, no appointment available till after 10/21, [facility]

## 2024-09-20 ENCOUNTER — EKG ENCOUNTER (OUTPATIENT)
Dept: LAB | Age: 69
End: 2024-09-20
Attending: INTERNAL MEDICINE
Payer: COMMERCIAL

## 2024-09-20 ENCOUNTER — OFFICE VISIT (OUTPATIENT)
Dept: INTERNAL MEDICINE CLINIC | Facility: CLINIC | Age: 69
End: 2024-09-20

## 2024-09-20 ENCOUNTER — LAB ENCOUNTER (OUTPATIENT)
Dept: LAB | Age: 69
End: 2024-09-20
Attending: INTERNAL MEDICINE
Payer: COMMERCIAL

## 2024-09-20 VITALS
WEIGHT: 193.5 LBS | HEIGHT: 68 IN | BODY MASS INDEX: 29.33 KG/M2 | RESPIRATION RATE: 18 BRPM | DIASTOLIC BLOOD PRESSURE: 80 MMHG | HEART RATE: 79 BPM | SYSTOLIC BLOOD PRESSURE: 121 MMHG

## 2024-09-20 DIAGNOSIS — I10 PRIMARY HYPERTENSION: ICD-10-CM

## 2024-09-20 DIAGNOSIS — Z01.818 PREOP GENERAL PHYSICAL EXAM: ICD-10-CM

## 2024-09-20 DIAGNOSIS — Z01.818 PREOP GENERAL PHYSICAL EXAM: Primary | ICD-10-CM

## 2024-09-20 DIAGNOSIS — Z95.0 HISTORY OF PERMANENT CARDIAC PACEMAKER PLACEMENT: ICD-10-CM

## 2024-09-20 DIAGNOSIS — H17.9 CORNEAL SCARRING: ICD-10-CM

## 2024-09-20 DIAGNOSIS — E78.2 MIXED HYPERLIPIDEMIA: ICD-10-CM

## 2024-09-20 DIAGNOSIS — Z12.11 COLON CANCER SCREENING: ICD-10-CM

## 2024-09-20 DIAGNOSIS — N42.89 ASYMMETRIC PROSTATE: ICD-10-CM

## 2024-09-20 DIAGNOSIS — E11.9 CONTROLLED TYPE 2 DIABETES MELLITUS WITHOUT COMPLICATION, WITHOUT LONG-TERM CURRENT USE OF INSULIN (HCC): ICD-10-CM

## 2024-09-20 LAB
ANION GAP SERPL CALC-SCNC: 6 MMOL/L (ref 0–18)
ATRIAL RATE: 62 BPM
BASOPHILS # BLD AUTO: 0.05 X10(3) UL (ref 0–0.2)
BASOPHILS NFR BLD AUTO: 0.6 %
BILIRUB UR QL: NEGATIVE
BUN BLD-MCNC: 15 MG/DL (ref 9–23)
BUN/CREAT SERPL: 12.5 (ref 10–20)
CALCIUM BLD-MCNC: 9.6 MG/DL (ref 8.7–10.4)
CHLORIDE SERPL-SCNC: 104 MMOL/L (ref 98–112)
CLARITY UR: CLEAR
CO2 SERPL-SCNC: 29 MMOL/L (ref 21–32)
CREAT BLD-MCNC: 1.2 MG/DL
DEPRECATED RDW RBC AUTO: 41.4 FL (ref 35.1–46.3)
EGFRCR SERPLBLD CKD-EPI 2021: 65 ML/MIN/1.73M2 (ref 60–?)
EOSINOPHIL # BLD AUTO: 1.76 X10(3) UL (ref 0–0.7)
EOSINOPHIL NFR BLD AUTO: 22.3 %
ERYTHROCYTE [DISTWIDTH] IN BLOOD BY AUTOMATED COUNT: 13 % (ref 11–15)
FASTING STATUS PATIENT QL REPORTED: YES
GLUCOSE BLD-MCNC: 114 MG/DL (ref 70–99)
GLUCOSE UR-MCNC: NORMAL MG/DL
HCT VFR BLD AUTO: 43 %
HGB BLD-MCNC: 14.7 G/DL
HGB UR QL STRIP.AUTO: NEGATIVE
IMM GRANULOCYTES # BLD AUTO: 0.02 X10(3) UL (ref 0–1)
IMM GRANULOCYTES NFR BLD: 0.3 %
KETONES UR-MCNC: NEGATIVE MG/DL
LEUKOCYTE ESTERASE UR QL STRIP.AUTO: NEGATIVE
LYMPHOCYTES # BLD AUTO: 1.73 X10(3) UL (ref 1–4)
LYMPHOCYTES NFR BLD AUTO: 21.9 %
MCH RBC QN AUTO: 29.8 PG (ref 26–34)
MCHC RBC AUTO-ENTMCNC: 34.2 G/DL (ref 31–37)
MCV RBC AUTO: 87 FL
MONOCYTES # BLD AUTO: 0.45 X10(3) UL (ref 0.1–1)
MONOCYTES NFR BLD AUTO: 5.7 %
NEUTROPHILS # BLD AUTO: 3.89 X10 (3) UL (ref 1.5–7.7)
NEUTROPHILS # BLD AUTO: 3.89 X10(3) UL (ref 1.5–7.7)
NEUTROPHILS NFR BLD AUTO: 49.2 %
NITRITE UR QL STRIP.AUTO: NEGATIVE
OSMOLALITY SERPL CALC.SUM OF ELEC: 290 MOSM/KG (ref 275–295)
P AXIS: 39 DEGREES
P-R INTERVAL: 218 MS
PH UR: 6.5 [PH] (ref 5–8)
PLATELET # BLD AUTO: 232 10(3)UL (ref 150–450)
POTASSIUM SERPL-SCNC: 4.4 MMOL/L (ref 3.5–5.1)
PROT UR-MCNC: NEGATIVE MG/DL
Q-T INTERVAL: 486 MS
QRS DURATION: 186 MS
QTC CALCULATION (BEZET): 493 MS
R AXIS: -72 DEGREES
RBC # BLD AUTO: 4.94 X10(6)UL
SODIUM SERPL-SCNC: 139 MMOL/L (ref 136–145)
SP GR UR STRIP: 1.01 (ref 1–1.03)
T AXIS: 99 DEGREES
UROBILINOGEN UR STRIP-ACNC: NORMAL
VENTRICULAR RATE: 62 BPM
WBC # BLD AUTO: 7.9 X10(3) UL (ref 4–11)

## 2024-09-20 PROCEDURE — 80048 BASIC METABOLIC PNL TOTAL CA: CPT

## 2024-09-20 PROCEDURE — 85025 COMPLETE CBC W/AUTO DIFF WBC: CPT

## 2024-09-20 PROCEDURE — 36415 COLL VENOUS BLD VENIPUNCTURE: CPT

## 2024-09-20 PROCEDURE — 93005 ELECTROCARDIOGRAM TRACING: CPT

## 2024-09-20 PROCEDURE — 81003 URINALYSIS AUTO W/O SCOPE: CPT

## 2024-09-20 PROCEDURE — 99214 OFFICE O/P EST MOD 30 MIN: CPT | Performed by: INTERNAL MEDICINE

## 2024-09-20 PROCEDURE — 93010 ELECTROCARDIOGRAM REPORT: CPT | Performed by: STUDENT IN AN ORGANIZED HEALTH CARE EDUCATION/TRAINING PROGRAM

## 2024-09-20 NOTE — PROGRESS NOTES
Subjective:     Patient ID: Marco Antonio Tucker is a 68 year old male.    Patient presents today for preop medical clearance as requested by Dr Miguel suárez for scheduled left eye penetrating Keratoplasty to be done on Oct 11, 2024 at [facility].   Pt has no history of CAD, CHF, CVA, creatinine above 2, or use of insulin for his DM 2.     Pt has history of cardiac permanent pacemaker placement for history of 3rd degreee AV block .        History/Other:   Review of Systems   Constitutional: Negative.    HENT: Negative.     Eyes:  Positive for visual disturbance.   Respiratory: Negative.          No hemoptysis   Cardiovascular: Negative.  Negative for chest pain, palpitations and leg swelling.        No pnd/orthopnea  Walks at least 3 to 4 times walks for 2miles     Gastrointestinal: Negative.  Negative for anal bleeding and blood in stool.   Genitourinary: Negative.    Neurological:  Negative for syncope.     Current Outpatient Medications   Medication Sig Dispense Refill    telmisartan 40 MG Oral Tab Take 1 tablet (40 mg total) by mouth nightly. FOR BLOOD PRESSURE 90 tablet 3    rosuvastatin 5 MG Oral Tab Take 1 tablet (5 mg total) by mouth nightly. 90 tablet 1    aspirin (ASPIRIN 81) 81 MG Oral Tab EC Take 1 tablet (81 mg total) by mouth in the morning. 90 tablet 9     Allergies:  Allergies   Allergen Reactions    Atarax [Hydroxyzine] FATIGUE    Shrimp OTHER (SEE COMMENTS)     Gout flare.        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)    Cervical spondylosis    Essential hypertension    Gout    High blood pressure    LBBB (left bundle branch block)    Osteoarthritis    Prediabetes    Unspecified essential hypertension      Past Surgical History:   Procedure Laterality Date    Cardiac catheterization      2015 normal coronary arteries on angiogram    Cardiac pacemaker placement      Colonoscopy N/A 11/25/2017    Procedure: COLONOSCOPY;  Surgeon: Romeo Kim MD;  Location: [facility]  ENDOSCOPY    Corneal transplant,lamellar      2023    Other surgical history  10/2015    pacemaker      Family History   Problem Relation Age of Onset    Diabetes Father     Diabetes Mother     Hypertension Mother     Heart Disorder Mother     Cancer Sister         breast cancer    Diabetes Brother       Social History:   Social History     Socioeconomic History    Marital status:    Tobacco Use    Smoking status: Never     Passive exposure: Never    Smokeless tobacco: Never   Vaping Use    Vaping status: Never Used   Substance and Sexual Activity    Alcohol use: No     Alcohol/week: 0.0 standard drinks of alcohol    Drug use: No   Other Topics Concern    Caffeine Concern Yes     Comment: coffee, 2 or more cups daily    Exercise No    Pt has a pacemaker Yes    Pt has a defibrillator No    Reaction to local anesthetic No   Social History Narrative    The patient does not use an assistive device..      The patient does live in a home with stairs.        Lives with wife     Drives a taxi for work    Born in [facility]        Objective:   Physical Exam  Constitutional:       General: He is not in acute distress.     Appearance: He is well-developed. He is not ill-appearing, toxic-appearing or diaphoretic.   HENT:      Head: Normocephalic and atraumatic.      Right Ear: External ear normal.      Left Ear: External ear normal.      Nose: Nose normal.      Mouth/Throat:      Pharynx: No oropharyngeal exudate.   Eyes:      General:         Right eye: No discharge.         Left eye: No discharge.      Conjunctiva/sclera: Conjunctivae normal.      Pupils: Pupils are equal, round, and reactive to light.   Neck:      Vascular: No carotid bruit or JVD.   Cardiovascular:      Rate and Rhythm: Normal rate and regular rhythm.      Heart sounds: Normal heart sounds. No murmur heard.     No gallop.   Pulmonary:      Effort: Pulmonary effort is normal. No respiratory distress.      Breath sounds: Normal breath sounds. No wheezing  or rales.   Abdominal:      General: Bowel sounds are normal. There is no distension.      Palpations: Abdomen is soft. There is no mass.      Tenderness: There is no abdominal tenderness. There is no guarding or rebound.   Musculoskeletal:         General: No tenderness. Normal range of motion.      Cervical back: Normal range of motion and neck supple. No rigidity or tenderness.      Right lower leg: No edema.      Left lower leg: No edema.   Lymphadenopathy:      Cervical: No cervical adenopathy.   Skin:     General: Skin is warm and dry.      Findings: No rash.   Neurological:      Mental Status: He is alert and oriented to person, place, and time.         Assessment & Plan:   (Z01.818) Preop general physical exam  (primary encounter diagnosis)  Plan: CBC With Differential With Platelet, Basic         Metabolic Panel (8), Urinalysis, Routine, EKG         12 Lead to be performed at [facility]        Preop lab and EKG ordered as requested by crystal   Pt has no active cardiac  condtions.  He also has good functional capacity able to do >4METS activities without getting chest pains.   He has acceptable risk for his surgery and may proceed as planned.     (H17.9) Corneal scarring  Plan: as per recommendation of optha.     (Z95.0) History of permanent cardiac pacemaker placement  Plan: hx of 3rd degree AV block years ago , had PPM placement, had see his cardio less than a year ago for his annual ffup.     (E11.9) Controlled type 2 diabetes mellitus without complication, without long-term current use of insulin (HCC)  Plan: controlled with diet alone; last A1c 4 mos ago was 6.5    (I10) Primary hypertension  Plan: bp controlled with bp med. Cpm.     (E78.2) Mixed hyperlipidemia  Plan: controlled with his chol statin med. Cpm .    (Z12.11) Colon cancer screening  Plan: pt reminded to see gi for his colonoscopy.     (N42.89) Asymmetric prostate  Plan: pt reminded to see urologist and pt told me he  will do.        Orders Placed This Encounter   Procedures    CBC With Differential With Platelet    Basic Metabolic Panel (8)    Urinalysis, Routine       Meds This Visit:  Requested Prescriptions      No prescriptions requested or ordered in this encounter       Imaging & Referrals:  None

## 2024-09-24 RX ORDER — ROSUVASTATIN CALCIUM 5 MG/1
5 TABLET, COATED ORAL NIGHTLY
Qty: 90 TABLET | Refills: 3 | Status: SHIPPED | OUTPATIENT
Start: 2024-09-24

## 2024-09-24 NOTE — TELEPHONE ENCOUNTER
Refill Per Protocol     Requested Prescriptions   Pending Prescriptions Disp Refills    ROSUVASTATIN 5 MG Oral Tab [Pharmacy Med Name: ROSUVASTATIN CALCIUM 5 MG TAB] 90 tablet 1     Sig: TAKE 1 TABLET BY MOUTH EVERY DAY AT NIGHT       Cholesterol Medication Protocol Passed - 9/19/2024  7:44 PM        Passed - ALT < 80     Lab Results   Component Value Date    ALT 26 05/03/2024             Passed - ALT resulted within past year        Passed - Lipid panel within past 12 months     Lab Results   Component Value Date    CHOLEST 73 05/03/2024    TRIG 46 05/03/2024    HDL 18 (L) 05/03/2024    LDL 43 05/03/2024    VLDL 6 05/03/2024    NONHDLC 55 05/03/2024             Passed - In person appointment or virtual visit in the past 12 mos or appointment in next 3 mos     Recent Outpatient Visits              4 [facility]    2 months ago [facility]    [facility]    4 months ago Annual physical exam    [facility] Chaim Naranjo MD    Office Visit    12 months ago Preop general physical exam    [facility] Chaim Naranjo MD    Office Visit    1 year ago Community acquired pneumonia, unspecified laterality    [facility] Chaim Naranjo MD    Office Visit                               Recent Outpatient Visits              4 days ago Preop general physical exam    [facility] Chaim Naranjo MD    Office Visit    2 months ago Preop general physical exam    [facility] Chaim Naranjo MD    Office Visit    4 months ago Annual physical exam    [facility],  Chaim Naranjo MD    Office Visit    12 months ago Preop general physical exam    [facility], Chaim Naranjo MD    Office Visit    1 year ago Community acquired pneumonia, unspecified laterality    [facility], Chaim Naranjo MD    Office Visit

## 2024-10-09 ENCOUNTER — LAB ENCOUNTER (OUTPATIENT)
Dept: LAB | Age: 69
End: 2024-10-09
Attending: STUDENT IN AN ORGANIZED HEALTH CARE EDUCATION/TRAINING PROGRAM
Payer: COMMERCIAL

## 2024-10-09 ENCOUNTER — TELEPHONE (OUTPATIENT)
Dept: INTERNAL MEDICINE CLINIC | Facility: CLINIC | Age: 69
End: 2024-10-09

## 2024-10-09 ENCOUNTER — HOSPITAL ENCOUNTER (OUTPATIENT)
Dept: GENERAL RADIOLOGY | Age: 69
Discharge: HOME | End: 2024-10-09
Attending: STUDENT IN AN ORGANIZED HEALTH CARE EDUCATION/TRAINING PROGRAM
Payer: COMMERCIAL

## 2024-10-09 ENCOUNTER — OFFICE VISIT (OUTPATIENT)
Dept: INTERNAL MEDICINE CLINIC | Facility: CLINIC | Age: 69
End: 2024-10-09

## 2024-10-09 VITALS
BODY MASS INDEX: 29.55 KG/M2 | TEMPERATURE: 98 F | SYSTOLIC BLOOD PRESSURE: 124 MMHG | HEART RATE: 81 BPM | OXYGEN SATURATION: 97 % | DIASTOLIC BLOOD PRESSURE: 74 MMHG | WEIGHT: 195 LBS | HEIGHT: 68 IN

## 2024-10-09 DIAGNOSIS — J32.4 CHRONIC PANSINUSITIS: ICD-10-CM

## 2024-10-09 DIAGNOSIS — J45.51 SEVERE PERSISTENT REACTIVE AIRWAY DISEASE WITH ACUTE EXACERBATION (HCC): ICD-10-CM

## 2024-10-09 DIAGNOSIS — J06.9 UPPER RESPIRATORY TRACT INFECTION, UNSPECIFIED TYPE: Primary | ICD-10-CM

## 2024-10-09 DIAGNOSIS — J06.9 UPPER RESPIRATORY TRACT INFECTION, UNSPECIFIED TYPE: ICD-10-CM

## 2024-10-09 DIAGNOSIS — J21.9 ACUTE BRONCHIOLITIS DUE TO UNSPECIFIED ORGANISM: ICD-10-CM

## 2024-10-09 PROCEDURE — 71046 X-RAY EXAM CHEST 2 VIEWS: CPT | Performed by: STUDENT IN AN ORGANIZED HEALTH CARE EDUCATION/TRAINING PROGRAM

## 2024-10-09 PROCEDURE — 87637 SARSCOV2&INF A&B&RSV AMP PRB: CPT

## 2024-10-09 PROCEDURE — 99214 OFFICE O/P EST MOD 30 MIN: CPT | Performed by: STUDENT IN AN ORGANIZED HEALTH CARE EDUCATION/TRAINING PROGRAM

## 2024-10-09 RX ORDER — PREDNISONE 10 MG/1
TABLET ORAL
Qty: 30 TABLET | Refills: 0 | Status: SHIPPED | OUTPATIENT
Start: 2024-10-09 | End: 2024-10-20

## 2024-10-09 RX ORDER — FLUTICASONE PROPIONATE 50 MCG
2 SPRAY, SUSPENSION (ML) NASAL DAILY
Qty: 16 EACH | Refills: 11 | Status: SHIPPED | OUTPATIENT
Start: 2024-10-09

## 2024-10-09 RX ORDER — BENZONATATE 200 MG/1
200 CAPSULE ORAL 3 TIMES DAILY PRN
Qty: 90 CAPSULE | Refills: 0 | Status: SHIPPED | OUTPATIENT
Start: 2024-10-09 | End: 2024-11-08

## 2024-10-09 RX ORDER — AZELASTINE HYDROCHLORIDE 137 UG/1
1-2 SPRAY, METERED NASAL 2 TIMES DAILY
Qty: 30 ML | Refills: 11 | Status: SHIPPED | OUTPATIENT
Start: 2024-10-09

## 2024-10-09 RX ORDER — ALBUTEROL SULFATE 90 UG/1
1 INHALANT RESPIRATORY (INHALATION) EVERY 6 HOURS PRN
Qty: 8.5 EACH | Refills: 1 | Status: SHIPPED | OUTPATIENT
Start: 2024-10-09

## 2024-10-09 RX ORDER — FLUTICASONE PROPIONATE AND SALMETEROL 100; 50 UG/1; UG/1
1 POWDER RESPIRATORY (INHALATION) 2 TIMES DAILY
Qty: 60 EACH | Refills: 0 | Status: SHIPPED | OUTPATIENT
Start: 2024-10-09

## 2024-10-09 RX ORDER — CODEINE PHOSPHATE AND GUAIFENESIN 10; 100 MG/5ML; MG/5ML
10 SOLUTION ORAL EVERY 6 HOURS PRN
Qty: 118 ML | Refills: 0 | Status: SHIPPED | OUTPATIENT
Start: 2024-10-09 | End: 2024-10-16

## 2024-10-09 NOTE — PROGRESS NOTES
OFFICE NOTE     Patient ID: Marco Antonio Tucker is a 68 year old male.  Today's Date: 10/09/24  Chief Complaint: Cough (3 days cough and sob)    Pt is a 67y/o male with Pmhx of ACID/PPM, HLD, HTN Pre-diabetes, Multiple thyroid nodule, chronic gout, whom presents to clinic with cough/URI symptoms.     Recent travel (returned from [place] on Sunday), and symptoms   Taking albuterol PRN but minimal relief.       Cough  This is a new problem. The current episode started in the past 7 days. The problem has been gradually worsening. The problem occurs constantly. The cough is Productive of purulent sputum. Associated symptoms include ear congestion, a fever, nasal congestion, postnasal drip, rhinorrhea, shortness of breath and wheezing.   Upper Respiratory Infection   Associated symptoms include coughing, rhinorrhea and wheezing.         Vitals:    10/09/24 0935   BP: 124/74   Pulse: 81   Temp: 97.5 °F (36.4 °C)   SpO2: 97%   Weight: 195 lb (88.5 kg)   Height: 5' 8\" (1.727 m)     body mass index is 29.65 kg/m².  BP Readings from Last 3 Encounters:   10/09/24 124/74   09/20/24 121/80   07/16/24 120/74     The ASCVD Risk score (Jovanni HERNÁNDEZ, et al., 2019) failed to calculate for the following reasons:    The valid HDL cholesterol range is 20 to 100 mg/dL    The valid total cholesterol range is 130 to 320 mg/dL      Medications reviewed:  Current Outpatient Medications   Medication Sig Dispense Refill    predniSONE 10 MG Oral Tab Take 4 tablets (40 mg total) by mouth daily for 3 days, THEN 3 tablets (30 mg total) daily for 3 days, THEN 2 tablets (20 mg total) daily for 3 days, THEN 1 tablet (10 mg total) daily for 3 days. TAKE WITH FOOD.. 30 tablet 0    guaiFENesin-codeine 100-10 MG/5ML Oral Solution Take 10 mL by mouth every 6 (six) hours as needed for cough. 118 mL 0    albuterol 108 (90 Base) MCG/ACT Inhalation Aero Soln Inhale 1 puff into the lungs every 6 (six) hours as needed for Wheezing. 8.5 each 1    benzonatate  200 MG Oral Cap Take 1 capsule (200 mg total) by mouth 3 (three) times daily as needed for cough (FOR COUGH). 90 capsule 0    fluticasone-salmeterol (ADVAIR DISKUS) 100-50 MCG/ACT Inhalation Aerosol Powder, Breath Activated Inhale 1 puff into the lungs 2 (two) times daily. 60 each 0    fluticasone propionate 50 MCG/ACT Nasal Suspension 2 sprays by Each Nare route daily. FOR NASAL CONGESTION/SINUS SYMPTOMS. 16 each 11    azelastine 137 MCG/SPRAY Nasal Solution 1-2 sprays by Nasal route in the morning and 1-2 sprays before bedtime. FOR SINUS SYMPTOMS/NASAL CONGESTION.. 30 mL 11    rosuvastatin 5 MG Oral Tab Take 1 tablet (5 mg total) by mouth nightly. 90 tablet 3    telmisartan 40 MG Oral Tab Take 1 tablet (40 mg total) by mouth nightly. FOR BLOOD PRESSURE 90 tablet 3    aspirin (ASPIRIN 81) 81 MG Oral Tab EC Take 1 tablet (81 mg total) by mouth in the morning. 90 tablet 9         Assessment & Plan    1. Upper respiratory tract infection, unspecified type (Primary)  -     SARS-COV-22-[facility]; Future; Expected date: 10/09/2024  Pt with URI and recent travel   Plan  -check for covid, if positive start paxlovid, (and hold statin for 10 days) if starting.     2. Acute bronchiolitis due to unspecified organism  -     guaiFENesin-Codeine; Take 10 mL by mouth every 6 (six) hours as needed for cough.  Dispense: 118 mL; Refill: 0  -     Albuterol Sulfate HFA; Inhale 1 puff into the lungs every 6 (six) hours as needed for Wheezing.  Dispense: 8.5 each; Refill: 1  -     Benzonatate; Take 1 capsule (200 mg total) by mouth 3 (three) times daily as needed for cough (FOR COUGH).  Dispense: 90 capsule; Refill: 0  -     XR CHEST PA + LAT CHEST (CPT=71046); Future; Expected date: 10/09/2024  Pt with Acute Cough (<3 weeks) likely triggered from recent URI with burdensome symptoms  -Will start Tessalon Perles 1 p.o. 3 times daily as needed or Guaifenesin-codeine 5mg po q6hrs PRN for cough, 30ml no refills, educated to monitor for  oversedation.  -Obtain CXR for suspected atypical PNA  -StartDoxycycline 100mg po BID for 10days (due to PCN allergy),  for  CAP coverage and MRSA/Atypical coverage.   -pt educated while taking antibiotics should also take OTC priobiotics daily and/or natural probiotics such as greek yogurt/Kefir to help prevent development of C.Diff.  -Given severitywill start Prednisone taper 40mg for 3 days, 30mg for 3 days, 20mg for 3 days, lastly 10mg for 3 days  -Noted Wheezing and/or post viral cough will need to start rescue Albuterol HFA and ICS/LABA inhalation (covered by insurance) and follow up within the next couple to weeks.    3. Severe persistent reactive airway disease with acute exacerbation (HCC)  -     predniSONE; Take 4 tablets (40 mg total) by mouth daily for 3 days, THEN 3 tablets (30 mg total) daily for 3 days, THEN 2 tablets (20 mg total) daily for 3 days, THEN 1 tablet (10 mg total) daily for 3 days. TAKE WITH FOOD..  Dispense: 30 tablet; Refill: 0  -     Albuterol Sulfate HFA; Inhale 1 puff into the lungs every 6 (six) hours as needed for Wheezing.  Dispense: 8.5 each; Refill: 1  -     Fluticasone-Salmeterol; Inhale 1 puff into the lungs 2 (two) times daily.  Dispense: 60 each; Refill: 0  -     XR CHEST PA + LAT CHEST (CPT=71046); Future; Expected date: 10/09/2024  4. Chronic pansinusitis  -     Fluticasone Propionate; 2 sprays by Each Nare route daily. FOR NASAL CONGESTION/SINUS SYMPTOMS.  Dispense: 16 each; Refill: 11  -     Azelastine HCl; 1-2 sprays by Nasal route in the morning and 1-2 sprays before bedtime. FOR SINUS SYMPTOMS/NASAL CONGESTION..  Dispense: 30 mL; Refill: 11  Patient presenting URI and now sinus congestion tenderness and erythematous nasal turbinates consistent with acute secondary acute bacterial rhinosinusitis.  Plan:  -Use Flonase and Azelastine 1 puff each nostril daily for next month or till symptom resolves  Doxycycline 100mg po BID for 10days (due to PCN allergy), , pt educated  while taking antibiotics should also take OTC priobiotics daily and/or natural probiotics such as greek yogurt/Kefir to help prevent development of C.Diff.  -Start medrol dose pack for severe inflammation  -for sore throat, educated to purchase OTC benzocaine lozenges for laryngitis/pharyngitis as well  -take hot showers, drink tea and increase fluid intake   -If no improvement, referral given to ENT for further evaluation and evaluation of possible nasal polyp or if CT sinus warranted  -If no improvement also able to follow up with myself follow up in 10-14 days if needs antibiotic duration and agent if warranted       Follow Up: As needed/if symptoms worsen or Return in about 2 weeks (around 10/23/2024), or if symptoms worsen or fail to improve..     Objective/ Results:   Physical Exam  Constitutional:       Appearance: He is well-developed.   Cardiovascular:      Rate and Rhythm: Normal rate and regular rhythm.      Heart sounds: Normal heart sounds.   Pulmonary:      Effort: Pulmonary effort is normal.      Breath sounds: Wheezing and rhonchi present.   Abdominal:      General: Bowel sounds are normal.      Palpations: Abdomen is soft.   Skin:     General: Skin is warm and dry.   Neurological:      Mental Status: He is alert and oriented to person, place, and time.      Deep Tendon Reflexes: Reflexes are normal and symmetric.        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)    Chronic gout without tophus    Hypertension goal BP (blood pressure) < 130/80    Mixed hyperlipidemia    Multiple thyroid nodules    History of permanent cardiac pacemaker placement    History of gout    Prediabetes    LBBB (left bundle branch block)      Allergies[1]     Social History     Socioeconomic History    Marital status:    Tobacco Use    Smoking status: Never     Passive exposure: Never    Smokeless tobacco: Never   Vaping Use    Vaping status: Never Used   Substance and Sexual  Activity    Alcohol use: No     Alcohol/week: 0.0 standard drinks of alcohol    Drug use: No   Other Topics Concern    Caffeine Concern Yes     Comment: coffee, 2 or more cups daily    Exercise No    Pt has a pacemaker Yes    Pt has a defibrillator No    Reaction to local anesthetic No   Social History Narrative    The patient does not use an assistive device..      The patient does live in a home with stairs.        Lives with wife     Drives a taxi for work    Born in [facility]      Review of Systems   Constitutional:  Positive for fever.   HENT:  Positive for postnasal drip and rhinorrhea.    Respiratory:  Positive for cough, shortness of breath and wheezing.    Cardiovascular: Negative.    Gastrointestinal: Negative.    Skin: Negative.    Neurological: Negative.      All other systems negative unless otherwise stated in ROS or HPI above.       Hamlet Valencia MD  Internal Medicine       Call office with any questions or seek emergency care if necessary.   Patient understands and agrees to follow directions and advice.      ----------------------------------------- PATIENT INSTRUCTIONS-----------------------------------------     There are no Patient Instructions on file for this visit.        The 21st Century Cures Act makes medical notes available to patients in the interest of transparency.  However, please be advised that this is a medical document.  It is intended as a peer to peer communication.  It is written in medical language and may contain abbreviations or verbiage that are technical and unfamiliar.  It may appear blunt or direct.  Medical documents are intended to carry relevant information, facts as evident, and the clinical opinion of the practitioner.          [1]   Allergies  Allergen Reactions    Atarax [Hydroxyzine] FATIGUE    Shrimp OTHER (SEE COMMENTS)     Gout flare.

## 2024-10-09 NOTE — TELEPHONE ENCOUNTER
fluticasone-salmeterol (ADVAIR DISKUS) 100-50 MCG/ACT Inhalation Aerosol Powder, Breath Activated     Approval Details    Authorized until October 9, 2025  Information entered manually    [employer] has been approved.

## 2024-10-10 LAB
FLUAV + FLUBV RNA SPEC NAA+PROBE: NEGATIVE
FLUAV + FLUBV RNA SPEC NAA+PROBE: NEGATIVE
RSV RNA SPEC NAA+PROBE: NEGATIVE
SARS-COV-2 RNA RESP QL NAA+PROBE: NOT DETECTED

## 2024-10-10 NOTE — PROGRESS NOTES
No action if read by pt:    Rashid Mr. Tucker,   Your Covid,flu, rsv testing is negative,   Please continue with treatment as we discussed if no improvement follow up in 1-2 weeks.   -Dr. Valencia

## 2024-10-11 NOTE — PROGRESS NOTES
No action if read by pt:    Rashid Mr. Tucker,   Your CXR shows no signs of pneumonia, please continue with treatment as we discussed in clinic and if no improvement follow up in 1-2 weeks with Dr. Avila. Or myself.  -Dr. Valencia

## 2024-10-21 ENCOUNTER — TELEPHONE (OUTPATIENT)
Dept: INTERNAL MEDICINE CLINIC | Facility: CLINIC | Age: 69
End: 2024-10-21

## 2024-10-24 ENCOUNTER — OFFICE VISIT (OUTPATIENT)
Dept: INTERNAL MEDICINE CLINIC | Facility: CLINIC | Age: 69
End: 2024-10-24

## 2024-10-24 VITALS
DIASTOLIC BLOOD PRESSURE: 70 MMHG | WEIGHT: 191.5 LBS | OXYGEN SATURATION: 97 % | HEART RATE: 64 BPM | BODY MASS INDEX: 29.02 KG/M2 | SYSTOLIC BLOOD PRESSURE: 122 MMHG | HEIGHT: 68 IN | TEMPERATURE: 98 F

## 2024-10-24 DIAGNOSIS — E78.2 MIXED HYPERLIPIDEMIA: ICD-10-CM

## 2024-10-24 DIAGNOSIS — Z95.0 HISTORY OF PERMANENT CARDIAC PACEMAKER PLACEMENT: ICD-10-CM

## 2024-10-24 DIAGNOSIS — I44.2 AV BLOCK, 3RD DEGREE (HCC): ICD-10-CM

## 2024-10-24 DIAGNOSIS — E11.9 CONTROLLED TYPE 2 DIABETES MELLITUS WITHOUT COMPLICATION, WITHOUT LONG-TERM CURRENT USE OF INSULIN (HCC): ICD-10-CM

## 2024-10-24 DIAGNOSIS — H17.9 CORNEAL SCARRING: ICD-10-CM

## 2024-10-24 DIAGNOSIS — I10 PRIMARY HYPERTENSION: ICD-10-CM

## 2024-10-24 DIAGNOSIS — Z01.818 PREOP GENERAL PHYSICAL EXAM: Primary | ICD-10-CM

## 2024-10-24 LAB — HEMOGLOBIN A1C: 6.6 % (ref 4.3–5.6)

## 2024-10-24 PROCEDURE — 90471 IMMUNIZATION ADMIN: CPT | Performed by: INTERNAL MEDICINE

## 2024-10-24 PROCEDURE — 83036 HEMOGLOBIN GLYCOSYLATED A1C: CPT | Performed by: INTERNAL MEDICINE

## 2024-10-24 PROCEDURE — 99214 OFFICE O/P EST MOD 30 MIN: CPT | Performed by: INTERNAL MEDICINE

## 2024-10-24 PROCEDURE — 90662 IIV NO PRSV INCREASED AG IM: CPT | Performed by: INTERNAL MEDICINE

## 2024-10-24 RX ORDER — NEOMYCIN SULFATE, POLYMYXIN B SULFATE, AND DEXAMETHASONE 3.5; 10000; 1 MG/G; [USP'U]/G; MG/G
OINTMENT OPHTHALMIC
COMMUNITY
Start: 2024-06-04 | End: 2024-10-24 | Stop reason: ALTCHOICE

## 2024-10-24 RX ORDER — KETOROLAC TROMETHAMINE 5 MG/ML
1 SOLUTION OPHTHALMIC 4 TIMES DAILY
COMMUNITY
Start: 2024-05-20 | End: 2024-10-24 | Stop reason: ALTCHOICE

## 2024-10-24 NOTE — PROGRESS NOTES
Subjective:     Patient ID: Marco Antonio Tucker is a 68 year old male.    Patient presents today for preop medical clearance as requested by his ophthalmologist Dr. Miguel Tan for corneal transplant left side to be done November 1, 2024 at [facility].  Patient has a known history of corneal scarring on the left eye and had corneal transplant done before that has not been successful so patient is getting another corneal transplant.  His past medical history has been significant for third AV block status post pacemaker placement, hyperlipidemia as well as non-insulin-dependent diabetes mellitus.  He has no history of MIs, strokes, CHF, chronic kidney disease nor the use of insulin for his diabetes.  No bleeding or clotting disorders.  No history of obstructive sleep apnea.        History/Other:   Review of Systems   Constitutional: Negative.    HENT: Negative.     Eyes:  Positive for visual disturbance.   Respiratory: Negative.     Cardiovascular:  Negative for chest pain, palpitations and leg swelling.        No pnd/orthopnea; walks for 1 1/2mile 3 to 5 days/week and does not get chest pains.  He could climb a flight of stairs at home without having to stop nor does he get any chest pains.   Gastrointestinal: Negative.    Genitourinary: Negative.    Neurological:  Negative for syncope.   Hematological: Negative.      Current Outpatient Medications   Medication Sig Dispense Refill    albuterol 108 (90 Base) MCG/ACT Inhalation Aero Soln Inhale 1 puff into the lungs every 6 (six) hours as needed for Wheezing. 8.5 each 1    rosuvastatin 5 MG Oral Tab Take 1 tablet (5 mg total) by mouth nightly. 90 tablet 3    telmisartan 40 MG Oral Tab Take 1 tablet (40 mg total) by mouth nightly. FOR BLOOD PRESSURE 90 tablet 3    aspirin (ASPIRIN 81) 81 MG Oral Tab EC Take 1 tablet (81 mg total) by mouth in the morning. 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)    Cervical  spondylosis    Essential hypertension    Gout    High blood pressure    Hyperlipidemia    LBBB (left bundle branch block)    Osteoarthritis    Prediabetes    Unspecified essential hypertension      Past Surgical History:   Procedure Laterality Date    Cardiac catheterization      2015 normal coronary arteries on angiogram    Cardiac pacemaker placement      Colonoscopy N/A 11/25/2017    Procedure: COLONOSCOPY;  Surgeon: Romeo Kim MD;  Location: [facility] ENDOSCOPY    Corneal transplant,lamellar      2023    Other surgical history  10/2015    pacemaker      Family History   Problem Relation Age of Onset    Diabetes Father     Diabetes Mother     Hypertension Mother     Heart Disorder Mother     Cancer Sister         breast cancer    Diabetes Brother       Social History:   Social History     Socioeconomic History    Marital status:    Tobacco Use    Smoking status: Never     Passive exposure: Never    Smokeless tobacco: Never   Vaping Use    Vaping status: Never Used   Substance and Sexual Activity    Alcohol use: No     Alcohol/week: 0.0 standard drinks of alcohol    Drug use: No   Other Topics Concern    Caffeine Concern Yes     Comment: coffee, 2 or more cups daily    Exercise No    Pt has a pacemaker Yes    Pt has a defibrillator No    Reaction to local anesthetic No   Social History Narrative    The patient does not use an assistive device..      The patient does live in a home with stairs.        Lives with wife     Drives a taxi for work    Born in [facility]        Objective:   Physical Exam  Constitutional:       General: He is not in acute distress.     Appearance: He is well-developed. He is not ill-appearing, toxic-appearing or diaphoretic.   HENT:      Head: Normocephalic and atraumatic.      Right Ear: Tympanic membrane, ear canal and external ear normal.      Left Ear: Tympanic membrane, ear canal and external ear normal.      Nose: Nose normal.      Mouth/Throat:      Pharynx: No  oropharyngeal exudate.   Eyes:      General: No scleral icterus.        Right eye: No discharge.         Left eye: No discharge.      Conjunctiva/sclera: Conjunctivae normal.   Neck:      Thyroid: No thyromegaly.      Vascular: No carotid bruit or JVD.   Cardiovascular:      Rate and Rhythm: Normal rate and regular rhythm.      Pulses: Normal pulses.      Heart sounds: Normal heart sounds. No murmur heard.     No friction rub. No gallop.   Pulmonary:      Effort: Pulmonary effort is normal. No respiratory distress.      Breath sounds: Normal breath sounds. No wheezing or rales.   Abdominal:      General: Abdomen is flat. Bowel sounds are normal. There is no distension.      Palpations: Abdomen is soft. There is no mass.      Tenderness: There is no abdominal tenderness. There is no guarding or rebound.   Musculoskeletal:         General: Normal range of motion.      Cervical back: Normal range of motion and neck supple. No rigidity or tenderness.      Right lower leg: No edema.      Left lower leg: No edema.   Lymphadenopathy:      Cervical: No cervical adenopathy.   Skin:     General: Skin is warm and dry.      Coloration: Skin is not jaundiced or pale.      Findings: No rash.   Neurological:      Mental Status: He is alert and oriented to person, place, and time.   Psychiatric:         Mood and Affect: Mood normal.         Assessment & Plan:   (Z01.818) Preop general physical exam  (primary encounter diagnosis)  Plan: Patient has no active cardiac conditions and his RCRI score is 0, perioperative risk for MACE is less than 1%.  He has an acceptable risk for his coronary transplant surgery and may proceed as planned.    (H17.9) Corneal scarring  Plan: As per recommendation by Dr. Miguel Tan ophthalmologist.    (E11.9) Controlled type 2 diabetes mellitus without complication, without long-term current use of insulin ([facility])  Plan: POC Glycohemoglobin [22663]        We did his A1c 6.6 so his diabetes remains  controlled.  He is only on diet.    (I10) Primary hypertension  Plan: Controlled with current blood pressure meds.  CPM.    (E78.2) Mixed hyperlipidemia  Plan: Controlled with current cholesterol medication.  CPM.    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  She continues to be followed by his cardiologist and last pacemaker interrogation within this year.    (Z95.0) History of permanent cardiac pacemaker placement  Plan: See above.       Orders Placed This Encounter   Procedures    POC Glycohemoglobin [49963]    High Dose Fluzone trivalent influenza, 65yrs+ PFS (73518)       Meds This Visit:  Requested Prescriptions      No prescriptions requested or ordered in this encounter       Imaging & Referrals:  INFLUENZA VAC HIGH DOSE PRSV FREE          [1]   Allergies  Allergen Reactions    Atarax [Hydroxyzine] FATIGUE    Shrimp OTHER (SEE COMMENTS)     Gout flare.

## 2024-10-29 RX ORDER — AZELASTINE HYDROCHLORIDE 137 UG/1
1-2 SPRAY, METERED NASAL
COMMUNITY
Start: 2024-10-09

## 2024-10-29 RX ORDER — FLUTICASONE PROPIONATE AND SALMETEROL 100; 50 UG/1; UG/1
1 POWDER RESPIRATORY (INHALATION) 2 TIMES DAILY
COMMUNITY
Start: 2024-10-09

## 2024-10-29 RX ORDER — BENZONATATE 200 MG/1
200 CAPSULE ORAL
COMMUNITY
Start: 2024-10-09 | End: 2024-11-08

## 2024-10-29 RX ORDER — FLUTICASONE PROPIONATE 50 MCG
2 SPRAY, SUSPENSION (ML) NASAL DAILY
COMMUNITY
Start: 2024-10-09

## 2024-10-29 RX ORDER — ALBUTEROL SULFATE 90 UG/1
1 INHALANT RESPIRATORY (INHALATION)
COMMUNITY
Start: 2024-10-09

## 2024-10-29 SDOH — SOCIAL STABILITY: SOCIAL INSECURITY: HOW OFTEN DOES ANYONE, INCLUDING FAMILY AND FRIENDS, PHYSICALLY HURT YOU?: NEVER

## 2024-10-29 SDOH — SOCIAL STABILITY: SOCIAL INSECURITY: HOW OFTEN DOES ANYONE, INCLUDING FAMILY AND FRIENDS, INSULT OR TALK DOWN TO YOU?: NEVER

## 2024-10-29 SDOH — SOCIAL STABILITY: SOCIAL INSECURITY: HOW OFTEN DOES ANYONE, INCLUDING FAMILY AND FRIENDS, SCREAM OR CURSE AT YOU?: NEVER

## 2024-10-29 SDOH — SOCIAL STABILITY: SOCIAL INSECURITY: HOW OFTEN DOES ANYONE, INCLUDING FAMILY AND FRIENDS, THREATEN YOU WITH HARM?: NEVER

## 2024-10-29 ASSESSMENT — ACTIVITIES OF DAILY LIVING (ADL)
HISTORY OF FALLING IN THE LAST YEAR (PRIOR TO ADMISSION): NO
SENSORY_SUPPORT_DEVICES: EYEGLASSES

## 2024-11-01 ENCOUNTER — ANESTHESIA (OUTPATIENT)
Dept: SURGERY | Age: 69
End: 2024-11-01

## 2024-11-01 ENCOUNTER — HOSPITAL ENCOUNTER (OUTPATIENT)
Age: 69
Discharge: HOME | End: 2024-11-01
Attending: OPHTHALMOLOGY | Admitting: OPHTHALMOLOGY

## 2024-11-01 ENCOUNTER — ANESTHESIA EVENT (OUTPATIENT)
Dept: SURGERY | Age: 69
End: 2024-11-01

## 2024-11-01 VITALS
HEIGHT: 68 IN | OXYGEN SATURATION: 96 % | TEMPERATURE: 97.2 F | BODY MASS INDEX: 29.1 KG/M2 | RESPIRATION RATE: 18 BRPM | WEIGHT: 192 LBS | HEART RATE: 60 BPM | SYSTOLIC BLOOD PRESSURE: 125 MMHG | DIASTOLIC BLOOD PRESSURE: 70 MMHG

## 2024-11-01 LAB
GLUCOSE BLDC GLUCOMTR-MCNC: 116 MG/DL (ref 70–99)
GLUCOSE BLDC GLUCOMTR-MCNC: 126 MG/DL (ref 70–99)

## 2024-11-01 PROCEDURE — 13000006 HB ANESTHESIA MAC S/U + 1ST 15 MIN: Performed by: OPHTHALMOLOGY

## 2024-11-01 PROCEDURE — 10004651 HB RX, NO CHARGE ITEM: Performed by: OPHTHALMOLOGY

## 2024-11-01 PROCEDURE — 10004451 HB PACU RECOVERY 1ST 30 MINUTES: Performed by: OPHTHALMOLOGY

## 2024-11-01 PROCEDURE — 10002801 HB RX 250 W/O HCPCS: Performed by: OPHTHALMOLOGY

## 2024-11-01 PROCEDURE — 10002807 HB RX 258: Performed by: NURSE ANESTHETIST, CERTIFIED REGISTERED

## 2024-11-01 PROCEDURE — V2785 CORNEAL TISSUE PROCESSING: HCPCS | Performed by: OPHTHALMOLOGY

## 2024-11-01 PROCEDURE — 13000007 HB ANESTHESIA MAC EA ADD MINUTE: Performed by: OPHTHALMOLOGY

## 2024-11-01 PROCEDURE — 10004452 HB PACU ADDL 30 MINUTES: Performed by: OPHTHALMOLOGY

## 2024-11-01 PROCEDURE — 10002801 HB RX 250 W/O HCPCS: Performed by: NURSE ANESTHETIST, CERTIFIED REGISTERED

## 2024-11-01 PROCEDURE — 13000039 HB MAJOR COMPLEX CASE EA ADD MINUTE: Performed by: OPHTHALMOLOGY

## 2024-11-01 PROCEDURE — 10006023 HB SUPPLY 272: Performed by: OPHTHALMOLOGY

## 2024-11-01 PROCEDURE — 10002803 HB RX 637: Performed by: OPHTHALMOLOGY

## 2024-11-01 PROCEDURE — 82962 GLUCOSE BLOOD TEST: CPT

## 2024-11-01 PROCEDURE — 10002800 HB RX 250 W HCPCS: Performed by: NURSE ANESTHETIST, CERTIFIED REGISTERED

## 2024-11-01 PROCEDURE — 10006027 HB SUPPLY 278: Performed by: OPHTHALMOLOGY

## 2024-11-01 PROCEDURE — 87070 CULTURE OTHR SPECIMN AEROBIC: CPT | Performed by: OPHTHALMOLOGY

## 2024-11-01 PROCEDURE — 13000001 HB PHASE II RECOVERY EA 30 MINUTES: Performed by: OPHTHALMOLOGY

## 2024-11-01 PROCEDURE — 10002800 HB RX 250 W HCPCS: Performed by: OPHTHALMOLOGY

## 2024-11-01 PROCEDURE — 13000038 HB MAJOR COMPLEX CASE S/U + 1ST 15 MIN: Performed by: OPHTHALMOLOGY

## 2024-11-01 DEVICE — IMPLANT CRNL ALLOGRAFT RT PRC PKP: Type: IMPLANTABLE DEVICE | Site: EYE | Status: FUNCTIONAL

## 2024-11-01 RX ORDER — PHENYLEPHRINE HYDROCHLORIDE 100 MG/ML
1 SOLUTION/ DROPS OPHTHALMIC
Status: COMPLETED | OUTPATIENT
Start: 2024-11-01 | End: 2024-11-01

## 2024-11-01 RX ORDER — OFLOXACIN 3 MG/ML
1 SOLUTION/ DROPS OPHTHALMIC
Status: COMPLETED | OUTPATIENT
Start: 2024-11-01 | End: 2024-11-01

## 2024-11-01 RX ORDER — HYDRALAZINE HYDROCHLORIDE 20 MG/ML
5 INJECTION INTRAMUSCULAR; INTRAVENOUS EVERY 10 MIN PRN
Status: DISCONTINUED | OUTPATIENT
Start: 2024-11-01 | End: 2024-11-01 | Stop reason: HOSPADM

## 2024-11-01 RX ORDER — DEXTROSE MONOHYDRATE 25 G/50ML
25 INJECTION, SOLUTION INTRAVENOUS PRN
Status: DISCONTINUED | OUTPATIENT
Start: 2024-11-01 | End: 2024-11-01 | Stop reason: HOSPADM

## 2024-11-01 RX ORDER — GLYCOPYRROLATE 0.2 MG/ML
INJECTION, SOLUTION INTRAMUSCULAR; INTRAVENOUS PRN
Status: DISCONTINUED | OUTPATIENT
Start: 2024-11-01 | End: 2024-11-01

## 2024-11-01 RX ORDER — SODIUM CHLORIDE, SODIUM LACTATE, POTASSIUM CHLORIDE, CALCIUM CHLORIDE 600; 310; 30; 20 MG/100ML; MG/100ML; MG/100ML; MG/100ML
INJECTION, SOLUTION INTRAVENOUS CONTINUOUS PRN
Status: DISCONTINUED | OUTPATIENT
Start: 2024-11-01 | End: 2024-11-01

## 2024-11-01 RX ORDER — NICOTINE POLACRILEX 4 MG
30 LOZENGE BUCCAL
Status: DISCONTINUED | OUTPATIENT
Start: 2024-11-01 | End: 2024-11-01 | Stop reason: HOSPADM

## 2024-11-01 RX ORDER — DEXAMETHASONE SODIUM PHOSPHATE 4 MG/ML
INJECTION, SOLUTION INTRA-ARTICULAR; INTRALESIONAL; INTRAMUSCULAR; INTRAVENOUS; SOFT TISSUE PRN
Status: DISCONTINUED | OUTPATIENT
Start: 2024-11-01 | End: 2024-11-01 | Stop reason: HOSPADM

## 2024-11-01 RX ORDER — 0.9 % SODIUM CHLORIDE 0.9 %
10 VIAL (ML) INJECTION PRN
Status: DISCONTINUED | OUTPATIENT
Start: 2024-11-01 | End: 2024-11-01 | Stop reason: HOSPADM

## 2024-11-01 RX ORDER — SODIUM CHLORIDE 9 MG/ML
INJECTION, SOLUTION INTRAVENOUS CONTINUOUS PRN
Status: DISCONTINUED | OUTPATIENT
Start: 2024-11-01 | End: 2024-11-01

## 2024-11-01 RX ORDER — DEXAMETHASONE SODIUM PHOSPHATE 4 MG/ML
INJECTION, SOLUTION INTRA-ARTICULAR; INTRALESIONAL; INTRAMUSCULAR; INTRAVENOUS; SOFT TISSUE PRN
Status: DISCONTINUED | OUTPATIENT
Start: 2024-11-01 | End: 2024-11-01

## 2024-11-01 RX ORDER — KETOROLAC TROMETHAMINE 5 MG/ML
1 SOLUTION OPHTHALMIC 4 TIMES DAILY
Status: DISCONTINUED | OUTPATIENT
Start: 2024-11-02 | End: 2024-11-01

## 2024-11-01 RX ORDER — TROPICAMIDE 10 MG/ML
1 SOLUTION/ DROPS OPHTHALMIC
Status: COMPLETED | OUTPATIENT
Start: 2024-11-01 | End: 2024-11-01

## 2024-11-01 RX ORDER — 0.9 % SODIUM CHLORIDE 0.9 %
2 VIAL (ML) INJECTION EVERY 12 HOURS SCHEDULED
Status: DISCONTINUED | OUTPATIENT
Start: 2024-11-01 | End: 2024-11-01 | Stop reason: HOSPADM

## 2024-11-01 RX ORDER — CYCLOPENTOLATE HYDROCHLORIDE 10 MG/ML
1 SOLUTION/ DROPS OPHTHALMIC
Status: COMPLETED | OUTPATIENT
Start: 2024-11-01 | End: 2024-11-01

## 2024-11-01 RX ORDER — ONDANSETRON 2 MG/ML
4 INJECTION INTRAMUSCULAR; INTRAVENOUS
Status: DISCONTINUED | OUTPATIENT
Start: 2024-11-01 | End: 2024-11-01 | Stop reason: HOSPADM

## 2024-11-01 RX ORDER — LIDOCAINE HYDROCHLORIDE 10 MG/ML
INJECTION, SOLUTION INFILTRATION; PERINEURAL PRN
Status: DISCONTINUED | OUTPATIENT
Start: 2024-11-01 | End: 2024-11-01 | Stop reason: HOSPADM

## 2024-11-01 RX ORDER — KETOROLAC TROMETHAMINE 5 MG/ML
1 SOLUTION OPHTHALMIC 4 TIMES DAILY
Status: DISCONTINUED | OUTPATIENT
Start: 2024-11-01 | End: 2024-11-01 | Stop reason: HOSPADM

## 2024-11-01 RX ORDER — PREDNISOLONE ACETATE 10 MG/ML
1 SUSPENSION/ DROPS OPHTHALMIC 4 TIMES DAILY
Status: DISCONTINUED | OUTPATIENT
Start: 2024-11-02 | End: 2024-11-01 | Stop reason: HOSPADM

## 2024-11-01 RX ORDER — ONDANSETRON 2 MG/ML
INJECTION INTRAMUSCULAR; INTRAVENOUS PRN
Status: DISCONTINUED | OUTPATIENT
Start: 2024-11-01 | End: 2024-11-01

## 2024-11-01 RX ORDER — ACETAMINOPHEN 325 MG/1
650 TABLET ORAL EVERY 4 HOURS PRN
Status: DISCONTINUED | OUTPATIENT
Start: 2024-11-01 | End: 2024-11-01 | Stop reason: HOSPADM

## 2024-11-01 RX ORDER — ROCURONIUM BROMIDE 10 MG/ML
INJECTION, SOLUTION INTRAVENOUS PRN
Status: DISCONTINUED | OUTPATIENT
Start: 2024-11-01 | End: 2024-11-01

## 2024-11-01 RX ORDER — PREDNISOLONE ACETATE 10 MG/ML
SUSPENSION/ DROPS OPHTHALMIC PRN
Status: DISCONTINUED | OUTPATIENT
Start: 2024-11-01 | End: 2024-11-01 | Stop reason: HOSPADM

## 2024-11-01 RX ORDER — LIDOCAINE HYDROCHLORIDE 20 MG/ML
INJECTION, SOLUTION INFILTRATION; PERINEURAL PRN
Status: DISCONTINUED | OUTPATIENT
Start: 2024-11-01 | End: 2024-11-01

## 2024-11-01 RX ORDER — GENTAMICIN 40 MG/ML
INJECTION, SOLUTION INTRAMUSCULAR; INTRAVENOUS PRN
Status: DISCONTINUED | OUTPATIENT
Start: 2024-11-01 | End: 2024-11-01 | Stop reason: HOSPADM

## 2024-11-01 RX ORDER — PROPOFOL 10 MG/ML
INJECTION, EMULSION INTRAVENOUS PRN
Status: DISCONTINUED | OUTPATIENT
Start: 2024-11-01 | End: 2024-11-01

## 2024-11-01 RX ORDER — OFLOXACIN 3 MG/ML
SOLUTION/ DROPS OPHTHALMIC PRN
Status: DISCONTINUED | OUTPATIENT
Start: 2024-11-01 | End: 2024-11-01 | Stop reason: HOSPADM

## 2024-11-01 RX ORDER — NEOSTIGMINE METHYLSULFATE 4 MG/4 ML
SYRINGE (ML) INTRAVENOUS PRN
Status: DISCONTINUED | OUTPATIENT
Start: 2024-11-01 | End: 2024-11-01

## 2024-11-01 RX ORDER — GENTAMICIN 40 MG/ML
20 INJECTION, SOLUTION INTRAMUSCULAR; INTRAVENOUS ONCE
Status: DISCONTINUED | OUTPATIENT
Start: 2024-11-01 | End: 2024-11-01 | Stop reason: HOSPADM

## 2024-11-01 RX ORDER — CHLORHEXIDINE GLUCONATE ORAL RINSE 1.2 MG/ML
15 SOLUTION DENTAL ONCE
Status: COMPLETED | OUTPATIENT
Start: 2024-11-01 | End: 2024-11-01

## 2024-11-01 RX ADMIN — ROCURONIUM BROMIDE 20 MG: 10 INJECTION INTRAVENOUS at 09:40

## 2024-11-01 RX ADMIN — OFLOXACIN 1 DROP: 3 SOLUTION/ DROPS OPHTHALMIC at 07:56

## 2024-11-01 RX ADMIN — SODIUM CHLORIDE, POTASSIUM CHLORIDE, SODIUM LACTATE AND CALCIUM CHLORIDE: 600; 310; 30; 20 INJECTION, SOLUTION INTRAVENOUS at 10:05

## 2024-11-01 RX ADMIN — OFLOXACIN 1 DROP: 3 SOLUTION/ DROPS OPHTHALMIC at 07:26

## 2024-11-01 RX ADMIN — ACETAMINOPHEN 650 MG: 325 TABLET ORAL at 11:55

## 2024-11-01 RX ADMIN — ROCURONIUM BROMIDE 20 MG: 10 INJECTION INTRAVENOUS at 09:31

## 2024-11-01 RX ADMIN — OFLOXACIN 1 DROP: 3 SOLUTION/ DROPS OPHTHALMIC at 07:36

## 2024-11-01 RX ADMIN — TROPICAMIDE 1 DROP: 10 SOLUTION/ DROPS OPHTHALMIC at 07:46

## 2024-11-01 RX ADMIN — ONDANSETRON 4 MG: 2 INJECTION INTRAMUSCULAR; INTRAVENOUS at 09:45

## 2024-11-01 RX ADMIN — LIDOCAINE HYDROCHLORIDE 100 MG: 20 INJECTION, SOLUTION INFILTRATION; PERINEURAL at 09:31

## 2024-11-01 RX ADMIN — CHLORHEXIDINE GLUCONATE 15 ML: 1.2 RINSE ORAL at 07:37

## 2024-11-01 RX ADMIN — TROPICAMIDE 1 DROP: 10 SOLUTION/ DROPS OPHTHALMIC at 07:26

## 2024-11-01 RX ADMIN — PHENYLEPHRINE HYDROCHLORIDE 1 DROP: 100 SOLUTION/ DROPS OPHTHALMIC at 07:26

## 2024-11-01 RX ADMIN — FENTANYL CITRATE 50 MCG: 50 INJECTION INTRAMUSCULAR; INTRAVENOUS at 10:32

## 2024-11-01 RX ADMIN — SODIUM CHLORIDE: 9 INJECTION, SOLUTION INTRAVENOUS at 09:27

## 2024-11-01 RX ADMIN — PHENYLEPHRINE HYDROCHLORIDE 1 DROP: 100 SOLUTION/ DROPS OPHTHALMIC at 07:36

## 2024-11-01 RX ADMIN — OFLOXACIN 1 DROP: 3 SOLUTION/ DROPS OPHTHALMIC at 07:46

## 2024-11-01 RX ADMIN — CYCLOPENTOLATE HYDROCHLORIDE 1 DROP: 10 SOLUTION OPHTHALMIC at 07:26

## 2024-11-01 RX ADMIN — FENTANYL CITRATE 50 MCG: 50 INJECTION INTRAMUSCULAR; INTRAVENOUS at 09:51

## 2024-11-01 RX ADMIN — DEXAMETHASONE SODIUM PHOSPHATE 4 MG: 4 INJECTION INTRA-ARTICULAR; INTRALESIONAL; INTRAMUSCULAR; INTRAVENOUS; SOFT TISSUE at 09:45

## 2024-11-01 RX ADMIN — NEOSTIGMINE METHYLSULFATE 4 MG: 1 INJECTION, SOLUTION INTRAVENOUS at 10:28

## 2024-11-01 RX ADMIN — GLYCOPYRROLATE 0.8 MG: 0.2 INJECTION INTRAMUSCULAR; INTRAVENOUS at 10:28

## 2024-11-01 RX ADMIN — CYCLOPENTOLATE HYDROCHLORIDE 1 DROP: 10 SOLUTION OPHTHALMIC at 07:36

## 2024-11-01 RX ADMIN — CYCLOPENTOLATE HYDROCHLORIDE 1 DROP: 10 SOLUTION OPHTHALMIC at 07:46

## 2024-11-01 RX ADMIN — SUCCINYLCHOLINE CHLORIDE 160 MG: 20 INJECTION, SOLUTION INTRAMUSCULAR; INTRAVENOUS at 09:31

## 2024-11-01 RX ADMIN — PROPOFOL 150 MG: 10 INJECTION, EMULSION INTRAVENOUS at 09:31

## 2024-11-01 RX ADMIN — TROPICAMIDE 1 DROP: 10 SOLUTION/ DROPS OPHTHALMIC at 07:36

## 2024-11-01 RX ADMIN — PHENYLEPHRINE HYDROCHLORIDE 1 DROP: 100 SOLUTION/ DROPS OPHTHALMIC at 07:46

## 2024-11-01 ASSESSMENT — PAIN SCALES - GENERAL
PAINLEVEL_OUTOF10: 5
PAINLEVEL_OUTOF10: 0
PAINLEVEL_OUTOF10: 3
PAINLEVEL_OUTOF10: 5
PAINLEVEL_OUTOF10: 0

## 2024-11-01 ASSESSMENT — ACTIVITIES OF DAILY LIVING (ADL)
CHRONIC_PAIN_PRESENT: NO
ADL_BEFORE_ADMISSION: INDEPENDENT
RECENT_DECLINE_ADL: NO
ADL_SCORE: 12
HISTORY OF FALLING IN THE LAST YEAR (PRIOR TO ADMISSION): NO
SENSORY_SUPPORT_DEVICES: EYEGLASSES;CONTACTS
NEEDS_ASSIST: NO
ADL_SHORT_OF_BREATH: NO

## 2024-11-02 LAB — BACTERIA EYE ANAEROBE+AEROBE CULT: NORMAL

## 2024-11-05 DIAGNOSIS — J45.51 SEVERE PERSISTENT REACTIVE AIRWAY DISEASE WITH ACUTE EXACERBATION (HCC): ICD-10-CM

## 2024-11-07 RX ORDER — FLUTICASONE PROPIONATE AND SALMETEROL 100; 50 UG/1; UG/1
1 POWDER RESPIRATORY (INHALATION) 2 TIMES DAILY
Qty: 60 EACH | Refills: 0 | OUTPATIENT
Start: 2024-11-07

## 2024-11-09 LAB — BACTERIA EYE ANAEROBE+AEROBE CULT: NORMAL

## 2024-11-15 LAB — BACTERIA EYE ANAEROBE+AEROBE CULT: NORMAL

## 2024-12-30 ENCOUNTER — NURSE TRIAGE (OUTPATIENT)
Dept: INTERNAL MEDICINE CLINIC | Facility: CLINIC | Age: 69
End: 2024-12-30

## 2024-12-30 ENCOUNTER — HOSPITAL ENCOUNTER (OUTPATIENT)
Dept: GENERAL RADIOLOGY | Age: 69
Discharge: HOME | End: 2024-12-30
Attending: INTERNAL MEDICINE
Payer: COMMERCIAL

## 2024-12-30 ENCOUNTER — OFFICE VISIT (OUTPATIENT)
Dept: INTERNAL MEDICINE CLINIC | Facility: CLINIC | Age: 69
End: 2024-12-30

## 2024-12-30 ENCOUNTER — LAB ENCOUNTER (OUTPATIENT)
Dept: LAB | Age: 69
End: 2024-12-30
Attending: INTERNAL MEDICINE
Payer: COMMERCIAL

## 2024-12-30 VITALS
SYSTOLIC BLOOD PRESSURE: 114 MMHG | HEART RATE: 72 BPM | WEIGHT: 199.38 LBS | TEMPERATURE: 99 F | BODY MASS INDEX: 30 KG/M2 | OXYGEN SATURATION: 98 % | DIASTOLIC BLOOD PRESSURE: 71 MMHG

## 2024-12-30 DIAGNOSIS — R05.1 ACUTE COUGH: ICD-10-CM

## 2024-12-30 DIAGNOSIS — R05.1 ACUTE COUGH: Primary | ICD-10-CM

## 2024-12-30 PROCEDURE — 99213 OFFICE O/P EST LOW 20 MIN: CPT | Performed by: INTERNAL MEDICINE

## 2024-12-30 PROCEDURE — 87637 SARSCOV2&INF A&B&RSV AMP PRB: CPT

## 2024-12-30 PROCEDURE — 71046 X-RAY EXAM CHEST 2 VIEWS: CPT | Performed by: INTERNAL MEDICINE

## 2024-12-30 RX ORDER — CODEINE PHOSPHATE AND GUAIFENESIN 10; 100 MG/5ML; MG/5ML
5 SOLUTION ORAL EVERY 6 HOURS PRN
COMMUNITY

## 2024-12-30 RX ORDER — PREDNISOLONE ACETATE 10 MG/ML
1 SUSPENSION/ DROPS OPHTHALMIC 4 TIMES DAILY
COMMUNITY
Start: 2024-12-09

## 2024-12-30 RX ORDER — BENZONATATE 100 MG/1
100 CAPSULE ORAL 3 TIMES DAILY PRN
Qty: 30 CAPSULE | Refills: 0 | Status: SHIPPED | OUTPATIENT
Start: 2024-12-30

## 2024-12-30 RX ORDER — AZELASTINE HYDROCHLORIDE 137 UG/1
2 SPRAY, METERED NASAL AS NEEDED
COMMUNITY
Start: 2024-10-09

## 2024-12-30 RX ORDER — DOXYCYCLINE 100 MG/1
100 TABLET ORAL 2 TIMES DAILY
Qty: 14 TABLET | Refills: 0 | Status: SHIPPED | OUTPATIENT
Start: 2024-12-30

## 2024-12-30 RX ORDER — CODEINE PHOSPHATE AND GUAIFENESIN 10; 100 MG/5ML; MG/5ML
5 SOLUTION ORAL EVERY 6 HOURS PRN
Qty: 120 ML | Refills: 0 | Status: SHIPPED | OUTPATIENT
Start: 2024-12-30

## 2024-12-30 RX ORDER — OFLOXACIN 3 MG/ML
1 SOLUTION/ DROPS OPHTHALMIC 2 TIMES DAILY WITH MEALS
COMMUNITY
Start: 2024-12-18

## 2024-12-30 NOTE — PROGRESS NOTES
Subjective:     Patient ID: Marco Antonio Tucker is a 69 year old male.    Cough  This is a new problem. The current episode started yesterday. The problem has been unchanged. The cough is Non-productive. Associated symptoms include myalgias, nasal congestion, postnasal drip, rhinorrhea, shortness of breath and wheezing. Pertinent negatives include no chest pain, ear congestion, ear pain, fever or sore throat. Associated symptoms comments: T max 101. Nothing aggravates the symptoms. He has tried a beta-agonist inhaler, rest and prescription cough suppressant for the symptoms. The treatment provided no relief. There is no history of asthma or emphysema.       History/Other:   Review of Systems   Constitutional: Negative.  Negative for fever.   HENT:  Positive for postnasal drip and rhinorrhea. Negative for ear pain and sore throat.    Respiratory:  Positive for cough, shortness of breath and wheezing.    Cardiovascular:  Negative for chest pain.   Gastrointestinal: Negative.    Genitourinary: Negative.    Musculoskeletal:  Positive for myalgias.     Current Outpatient Medications   Medication Sig Dispense Refill    azelastine 137 MCG/SPRAY Nasal Solution 2 sprays by Each Nare route as needed.      ofloxacin 0.3 % Ophthalmic Solution Place 1 drop into the left eye 2 (two) times daily with meals.      prednisoLONE 1 % Ophthalmic Suspension Place 1 drop into the left eye in the morning, at noon, in the evening, and at bedtime.      guaiFENesin-codeine 100-10 MG/5ML Oral Solution Take 5 mL by mouth every 6 (six) hours as needed for cough.      albuterol 108 (90 Base) MCG/ACT Inhalation Aero Soln Inhale 1 puff into the lungs every 6 (six) hours as needed for Wheezing. 8.5 each 1    rosuvastatin 5 MG Oral Tab Take 1 tablet (5 mg total) by mouth nightly. 90 tablet 3    telmisartan 40 MG Oral Tab Take 1 tablet (40 mg total) by mouth nightly. FOR BLOOD PRESSURE 90 tablet 3    aspirin (ASPIRIN 81) 81 MG Oral Tab EC Take 1 tablet (81  mg total) by mouth in the morning. 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)    Cervical spondylosis    Essential hypertension    Gout    High blood pressure    Hyperlipidemia    LBBB (left bundle branch block)    Osteoarthritis    Prediabetes    Unspecified essential hypertension      Past Surgical History:   Procedure Laterality Date    Cardiac catheterization      2015 normal coronary arteries on angiogram    Cardiac pacemaker placement      Colonoscopy N/A 11/25/2017    Procedure: COLONOSCOPY;  Surgeon: Romeo Kim MD;  Location: [facility] ENDOSCOPY    Corneal transplant,lamellar      2023    Other surgical history  10/2015    pacemaker      Family History   Problem Relation Age of Onset    Diabetes Father     Diabetes Mother     Hypertension Mother     Heart Disorder Mother     Cancer Sister         breast cancer    Diabetes Brother       Social History:   Social History     Socioeconomic History    Marital status:    Tobacco Use    Smoking status: Never     Passive exposure: Never    Smokeless tobacco: Never   Vaping Use    Vaping status: Never Used   Substance and Sexual Activity    Alcohol use: No     Alcohol/week: 0.0 standard drinks of alcohol    Drug use: No   Other Topics Concern    Caffeine Concern Yes     Comment: coffee, 2 or more cups daily    Exercise No    Pt has a pacemaker Yes    Pt has a defibrillator No    Reaction to local anesthetic No   Social History Narrative    The patient does not use an assistive device..      The patient does live in a home with stairs.        Lives with wife     Drives a taxi for work    Born in [facility]        Objective:   Physical Exam  Constitutional:       General: He is not in acute distress.     Appearance: He is not ill-appearing, toxic-appearing or diaphoretic.   HENT:      Right Ear: Tympanic membrane, ear canal and external ear normal.      Left Ear: Tympanic membrane, ear canal and external  ear normal.   Eyes:      General: No scleral icterus.        Right eye: No discharge.         Left eye: No discharge.   Cardiovascular:      Rate and Rhythm: Normal rate and regular rhythm.      Heart sounds: Normal heart sounds. No murmur heard.  Pulmonary:      Effort: Pulmonary effort is normal. No respiratory distress.      Breath sounds: Normal breath sounds. No stridor. No wheezing or rales.   Abdominal:      General: Bowel sounds are normal. There is no distension.      Palpations: Abdomen is soft.      Tenderness: There is no abdominal tenderness. There is no guarding.   Musculoskeletal:      Cervical back: Normal range of motion and neck supple. No rigidity or tenderness.      Right lower leg: No edema.      Left lower leg: No edema.   Lymphadenopathy:      Cervical: No cervical adenopathy.   Neurological:      Mental Status: He is alert.         Assessment & Plan:   (R05.1) Acute cough  (primary encounter diagnosis)  Plan: XR CHEST PA + LAT CHEST (CPT=71046),         SARS-CoV-2/Flu A and B/RSV by PCR ([facility]) [E]        *Collect in Office!, guaiFENesin-codeine 100-10        MG/5ML Oral Solution        We did do cxr today and showed small linear  left basilar opacity likely atelectasis vs scarring.  Pt did do  home covid test and negative today .  We had ordered flu/rsv/covid pcr test.  Pt started on cough med and also given doxycycline.  Pt to call us back if symptoms persist/worsens        No orders of the defined types were placed in this encounter.      Meds This Visit:  Requested Prescriptions      No prescriptions requested or ordered in this encounter       Imaging & Referrals:  None            [1]   Allergies  Allergen Reactions    Atarax [Hydroxyzine] FATIGUE    Shrimp OTHER (SEE COMMENTS)     Gout flare.

## 2024-12-30 NOTE — TELEPHONE ENCOUNTER
Action Requested: Summary for Provider     []  Critical Lab, Recommendations Needed  [] Need Additional Advice  []   FYI    []   Need Orders  [] Need Medications Sent to Pharmacy  []  Other     SUMMARY:Pt requesting appt with Dr , no appts or ADO  Coughing on Call, body aches ,fever- chills, trying to stay hydrated , sneezing,fever, No COVID test     Reason for call: Cough  Onset:yesterday                    Reason for Disposition   SEVERE coughing spells (e.g., whooping sound after coughing, vomiting after coughing)    Protocols used: Cough-A-OH

## 2024-12-31 ENCOUNTER — TELEPHONE (OUTPATIENT)
Dept: INTERNAL MEDICINE CLINIC | Facility: CLINIC | Age: 69
End: 2024-12-31

## 2024-12-31 DIAGNOSIS — R05.1 ACUTE COUGH: Primary | ICD-10-CM

## 2024-12-31 LAB
FLUAV + FLUBV RNA SPEC NAA+PROBE: DETECTED
FLUAV + FLUBV RNA SPEC NAA+PROBE: NOT DETECTED
RSV RNA SPEC NAA+PROBE: NOT DETECTED
SARS-COV-2 RNA RESP QL NAA+PROBE: NOT DETECTED

## 2024-12-31 RX ORDER — ALBUTEROL SULFATE 0.83 MG/ML
2.5 SOLUTION RESPIRATORY (INHALATION) EVERY 6 HOURS PRN
Qty: 120 EACH | Refills: 0 | Status: SHIPPED | OUTPATIENT
Start: 2024-12-31

## 2024-12-31 RX ORDER — OSELTAMIVIR PHOSPHATE 75 MG/1
75 CAPSULE ORAL 2 TIMES DAILY
Qty: 10 CAPSULE | Refills: 0 | Status: SHIPPED | OUTPATIENT
Start: 2024-12-31

## 2024-12-31 NOTE — TELEPHONE ENCOUNTER
I ordered portable nebulizer machine but we need to  call it in  since it doesn't go to the pharmacy; DME order in chart.   Albuterol neb solution sent to pharmacy .

## 2024-12-31 NOTE — TELEPHONE ENCOUNTER
Patient transferred from Central scheduling with name and date of birth verified .    Patient was seen in office with Dr Avila yesterday.  He is taking medications as prescribed and is feeling a little better today.  He notices that it is hard to get a full spray of the albuterol inhaler, it feels like it just stays in his mouth.  He asks if Dr Avila would consider a nebulizer and albuterol solution instead?  Dr Avila, Please advise?

## 2024-12-31 NOTE — TELEPHONE ENCOUNTER
Spoke with patient, Date of Birth verified  Patient was informed of MD recommendation, he stated understanding.  He will call back with DME information to send order.

## 2025-06-18 ENCOUNTER — OFFICE VISIT (OUTPATIENT)
Dept: INTERNAL MEDICINE CLINIC | Facility: CLINIC | Age: 70
End: 2025-06-18

## 2025-06-18 ENCOUNTER — LAB ENCOUNTER (OUTPATIENT)
Dept: LAB | Age: 70
End: 2025-06-18
Attending: INTERNAL MEDICINE
Payer: COMMERCIAL

## 2025-06-18 VITALS
WEIGHT: 202.25 LBS | HEIGHT: 68 IN | SYSTOLIC BLOOD PRESSURE: 128 MMHG | HEART RATE: 75 BPM | BODY MASS INDEX: 30.65 KG/M2 | DIASTOLIC BLOOD PRESSURE: 78 MMHG

## 2025-06-18 DIAGNOSIS — I44.2 AV BLOCK, 3RD DEGREE (HCC): ICD-10-CM

## 2025-06-18 DIAGNOSIS — E11.9 CONTROLLED TYPE 2 DIABETES MELLITUS WITHOUT COMPLICATION, WITHOUT LONG-TERM CURRENT USE OF INSULIN (HCC): ICD-10-CM

## 2025-06-18 DIAGNOSIS — Z87.39 HISTORY OF GOUT: ICD-10-CM

## 2025-06-18 DIAGNOSIS — Z12.5 PROSTATE CANCER SCREENING: ICD-10-CM

## 2025-06-18 DIAGNOSIS — Z12.11 COLON CANCER SCREENING: ICD-10-CM

## 2025-06-18 DIAGNOSIS — Z00.00 MEDICARE ANNUAL WELLNESS VISIT, SUBSEQUENT: ICD-10-CM

## 2025-06-18 DIAGNOSIS — I10 PRIMARY HYPERTENSION: ICD-10-CM

## 2025-06-18 DIAGNOSIS — E78.2 MIXED HYPERLIPIDEMIA: ICD-10-CM

## 2025-06-18 DIAGNOSIS — Z00.00 MEDICARE ANNUAL WELLNESS VISIT, SUBSEQUENT: Primary | ICD-10-CM

## 2025-06-18 DIAGNOSIS — E55.9 VITAMIN D DEFICIENCY: ICD-10-CM

## 2025-06-18 DIAGNOSIS — H17.9 CORNEAL SCARRING: ICD-10-CM

## 2025-06-18 DIAGNOSIS — Z95.0 HISTORY OF PERMANENT CARDIAC PACEMAKER PLACEMENT: ICD-10-CM

## 2025-06-18 LAB
ALBUMIN SERPL-MCNC: 4.7 G/DL (ref 3.2–4.8)
ALBUMIN/GLOB SERPL: 2.5 {RATIO} (ref 1–2)
ALP LIVER SERPL-CCNC: 79 U/L (ref 45–117)
ALT SERPL-CCNC: 25 U/L (ref 10–49)
ANION GAP SERPL CALC-SCNC: 8 MMOL/L (ref 0–18)
AST SERPL-CCNC: 22 U/L (ref ?–34)
BASOPHILS # BLD AUTO: 0.04 X10(3) UL (ref 0–0.2)
BASOPHILS NFR BLD AUTO: 0.5 %
BILIRUB SERPL-MCNC: 0.4 MG/DL (ref 0.2–1.1)
BUN BLD-MCNC: 14 MG/DL (ref 9–23)
BUN/CREAT SERPL: 12 (ref 10–20)
CALCIUM BLD-MCNC: 8.9 MG/DL (ref 8.7–10.4)
CHLORIDE SERPL-SCNC: 102 MMOL/L (ref 98–112)
CHOLEST SERPL-MCNC: 103 MG/DL (ref ?–200)
CO2 SERPL-SCNC: 27 MMOL/L (ref 21–32)
COMPLEXED PSA SERPL-MCNC: 3.79 NG/ML (ref ?–4)
CREAT BLD-MCNC: 1.17 MG/DL (ref 0.7–1.3)
CREAT UR-SCNC: 52.8 MG/DL
DEPRECATED RDW RBC AUTO: 41.4 FL (ref 35.1–46.3)
EGFRCR SERPLBLD CKD-EPI 2021: 67 ML/MIN/1.73M2 (ref 60–?)
EOSINOPHIL # BLD AUTO: 1.31 X10(3) UL (ref 0–0.7)
EOSINOPHIL NFR BLD AUTO: 17.6 %
ERYTHROCYTE [DISTWIDTH] IN BLOOD BY AUTOMATED COUNT: 12.8 % (ref 11–15)
FASTING PATIENT LIPID ANSWER: YES
FASTING STATUS PATIENT QL REPORTED: YES
GLOBULIN PLAS-MCNC: 1.9 G/DL (ref 2–3.5)
GLUCOSE BLD-MCNC: 110 MG/DL (ref 70–99)
HCT VFR BLD AUTO: 43.4 % (ref 39–53)
HDLC SERPL-MCNC: 21 MG/DL (ref 40–59)
HEMOGLOBIN A1C: 6.6 % (ref 4.3–5.6)
HGB BLD-MCNC: 14.8 G/DL (ref 13–17.5)
IMM GRANULOCYTES # BLD AUTO: 0.01 X10(3) UL (ref 0–1)
IMM GRANULOCYTES NFR BLD: 0.1 %
LDLC SERPL CALC-MCNC: 70 MG/DL (ref ?–100)
LYMPHOCYTES # BLD AUTO: 1.93 X10(3) UL (ref 1–4)
LYMPHOCYTES NFR BLD AUTO: 26 %
MCH RBC QN AUTO: 30.1 PG (ref 26–34)
MCHC RBC AUTO-ENTMCNC: 34.1 G/DL (ref 31–37)
MCV RBC AUTO: 88.2 FL (ref 80–100)
MICROALBUMIN UR-MCNC: <0.3 MG/DL
MONOCYTES # BLD AUTO: 0.48 X10(3) UL (ref 0.1–1)
MONOCYTES NFR BLD AUTO: 6.5 %
NEUTROPHILS # BLD AUTO: 3.66 X10 (3) UL (ref 1.5–7.7)
NEUTROPHILS # BLD AUTO: 3.66 X10(3) UL (ref 1.5–7.7)
NEUTROPHILS NFR BLD AUTO: 49.3 %
NONHDLC SERPL-MCNC: 82 MG/DL (ref ?–130)
OSMOLALITY SERPL CALC.SUM OF ELEC: 285 MOSM/KG (ref 275–295)
PLATELET # BLD AUTO: 220 10(3)UL (ref 150–450)
POTASSIUM SERPL-SCNC: 3.9 MMOL/L (ref 3.5–5.1)
PROT SERPL-MCNC: 6.6 G/DL (ref 5.7–8.2)
RBC # BLD AUTO: 4.92 X10(6)UL (ref 3.8–5.8)
SODIUM SERPL-SCNC: 137 MMOL/L (ref 136–145)
TRIGL SERPL-MCNC: 53 MG/DL (ref 30–149)
VLDLC SERPL CALC-MCNC: 8 MG/DL (ref 0–30)
WBC # BLD AUTO: 7.4 X10(3) UL (ref 4–11)

## 2025-06-18 PROCEDURE — 82570 ASSAY OF URINE CREATININE: CPT

## 2025-06-18 PROCEDURE — 36415 COLL VENOUS BLD VENIPUNCTURE: CPT

## 2025-06-18 PROCEDURE — 80053 COMPREHEN METABOLIC PANEL: CPT

## 2025-06-18 PROCEDURE — 80061 LIPID PANEL: CPT

## 2025-06-18 PROCEDURE — 85025 COMPLETE CBC W/AUTO DIFF WBC: CPT

## 2025-06-18 PROCEDURE — 85060 BLOOD SMEAR INTERPRETATION: CPT

## 2025-06-18 PROCEDURE — 82043 UR ALBUMIN QUANTITATIVE: CPT

## 2025-06-18 RX ORDER — TRIAMCINOLONE ACETONIDE 1 MG/G
1 CREAM TOPICAL 2 TIMES DAILY
Qty: 15 G | Refills: 0 | Status: SHIPPED | OUTPATIENT
Start: 2025-06-18

## 2025-06-18 NOTE — PROGRESS NOTES
Subjective:   Marco Antonio Tucker is a 69 year old male who presents for a MA AHA (Medicare Advantage Annual Health Assessment) and Initial Annual Wellness Visit (outside the first 12 months of Medicare eligibility, no prior AWV) and scheduled follow up of multiple significant but stable problems.               History/Other:   Fall Risk Assessment:   He has been screened for Falls and is low risk.      Cognitive Assessment:   He had a completely normal cognitive assessment - see flowsheet entries     Functional Ability/Status:   Marco Antonio Tucker has some abnormal functions as listed below:  He has Hearing problems based on screening of functional status.He has Vision problems based on screening of functional status.       Depression Screening (PHQ):  PHQ-2 SCORE: 0  , done 6/18/2025            Advanced Directives:   He does NOT have a Living Will. [Do you have a living will?: No]  He does NOT have a Power of  for Health Care. [Do you have a healthcare power of ?: No]  Discussed Advance Care Planning with patient (and family/surrogate if present). Standard forms made available to patient in After Visit Summary.      Problem List[1]  Allergies:  He is allergic to atarax [hydroxyzine] and shrimp.    Current Medications:  Active Meds, Sig Only[2]    Medical History:  He  has a past medical history of AV block, 3rd degree ([facility]) (09/26/2023), Cervical spondylosis, Controlled type 2 diabetes mellitus without complication, without long-term current use of insulin ([facility]) (6/20/2025), Essential hypertension (05/27/2015), Gout, High blood pressure, Hyperlipidemia, LBBB (left bundle branch block) (05/27/2015), Osteoarthritis, Prediabetes, and Unspecified essential hypertension.  Surgical History:  He  has a past surgical history that includes other surgical history (10/2015); Cardiac pacemaker placement; colonoscopy (N/A, 11/25/2017); Cardiac catheterization; and corneal transplant,lamellar.   Family History:  His family  history includes Cancer in his sister; Diabetes in his brother, father, and mother; Heart Disorder in his mother; Hypertension in his mother.  Social History:  He  reports that he has never smoked. He has never been exposed to tobacco smoke. He has never used smokeless tobacco. He reports that he does not drink alcohol and does not use drugs.    Tobacco:  He has never smoked tobacco.    CAGE Alcohol Screen:   CAGE screening score of 0 on 6/18/2025, showing low risk of alcohol abuse.      Patient Care Team:  Chaim Avila MD as PCP - General (Internal Medicine)  Jeffry Mccallum MD (Physical Medicine)    Review of Systems  GENERAL: feels well otherwise  SKIN: denies any unusual skin lesions  EYES: denies blurred vision or double vision  HEENT: denies nasal congestion, sinus pain or ST  LUNGS: denies shortness of breath with exertion  CARDIOVASCULAR: denies chest pain on exertion walks 1mile 3 times a week  GI: denies abdominal pain, denies heartburn; no hetmatochezia   : 1 per night nocturia, no complaint of urinary incontinence; no hemturia  MUSCULOSKELETAL: denies back pain  NEURO: denies headaches  PSYCHE: denies depression or anxiety  HEMATOLOGIC: denies hx of anemia  ENDOCRINE: denies thyroid history  ALL/ASTHMA: denies hx of allergy or asthma    Objective:   Physical Exam  General Appearance:  Alert, cooperative, no distress, appears stated age   Head:  Normocephalic, without obvious abnormality, atraumatic   Eyes:  PERRL, left cornea cloudy, EOM's intact, both eyes   Ears:  Normal TM's and external ear canals, both ears   Nose: Nares normal, septum midline, mucosa normal, no drainage or sinus tenderness   Throat: Lips, mucosa, and tongue normal; teeth and gums normal   Neck: Supple, symmetrical, trachea midline, no adenopathy, thyroid: not enlarged, symmetric, no tenderness/mass/nodules, no carotid bruit or JVD   Back:   Symmetric, no curvature, ROM normal, no CVA tenderness   Lungs:   Clear to  auscultation bilaterally, respirations unlabored   Chest Wall:  No tenderness or deformity   Heart:  Regular rate and rhythm, S1, S2 normal, no murmur, rub or gallop   Abdomen:   Soft, non-tender, bowel sounds active all four quadrants,  no masses, no organomegaly   Genitalia: Normal male   Rectal: Normal tone, symmetrcal mildly enlargede prostate no nodules/indurations, no masses or tenderness   Extremities: Extremities normal, atraumatic, no cyanosis or edema   Pulses: 2+ and symmetric' monofilament testing normal both feet   Skin: Skin color, texture, turgor normal, no rashes or lesions   Lymph nodes: Cervical, supraclavicular,  nodes normal   Neurologic: Normal     /78   Pulse 75   Ht 5' 8\" (1.727 m)   Wt 202 lb 4 oz (91.7 kg)   BMI 30.75 kg/m²  Estimated body mass index is 30.75 kg/m² as calculated from the following:    Height as of this encounter: 5' 8\" (1.727 m).    Weight as of this encounter: 202 lb 4 oz (91.7 kg).    Medicare Hearing Assessment:   Hearing Screening    Time taken: 6/18/2025  8:57 AM  Screening Method: Finger Rub, Questionnaire  Finger Rub Result: Fail (Comment: cannot her from left ear)  I have a problem hearing over the telephone: No I have trouble following the conversations when two or more people are talking at the same time: No   I have trouble understanding things on the TV: No I have to strain to understand conversations: No   I have to worry about missing the telephone ring or doorbell: No I have trouble hearing conversations in a noisy background such as a crowded room or restaurant: No   I get confused about where sounds come from: No I misunderstand some words in a sentence and need to ask people to repeat themselves: No   I especially have trouble understanding the speech of women and children: No I have trouble understanding the speaker in a large room such as at a meeting or place of [employer]: No   Many people I talk to seem to mumble (or don't speak clearly): No  People get annoyed because I misunderstand what they say: No   I misunderstand what others are saying and make inappropriate responses: No I avoid social activities because I cannot hear well and fear I will reply improperly: No   Family members and friends have told me they think I may have hearing loss: No             Visual Acuity:   Right Eye Visual Acuity: Corrected Right Eye Chart Acuity: 20/40   Left Eye Visual Acuity: Corrected Left Eye Chart Acuity:  (cannot see with left eye)   Both Eyes Visual Acuity: Corrected Both Eyes Chart Acuity: 20/30   Able To Tolerate Visual Acuity: Yes        Assessment & Plan:   Marco Antonio Tucker is a 69 year old male who presents for a Medicare Assessment.     (Z00.00) Medicare annual wellness visit, subsequent  (primary encounter diagnosis)  Plan: CBC With Differential With Platelet, Comp         Metabolic Panel (14), Microalb/Creat Ratio,         Random Urine        Routine labs ordered ; advised to get covid booster.     (E11.9) Controlled type 2 diabetes mellitus without complication, without long-term current use of insulin (HCC)  Plan: POC Glycohemoglobin [36671]        A1c today was 6.6 so dm controlled. Continue with diet .    (I10) Primary hypertension  Plan: bp controlled. Cpm.    (E78.2) Mixed hyperlipidemia  Plan: Lipid Panel        Check lipid panel. Pt told he should be taking his statin med.     (I44.2) AV block, 3rd degree (HCC)  Plan: see below; had PPM placement before.     (Z95.0) History of permanent cardiac pacemaker placement  Plan: pt had PPM and continous to ffup with cardiologist annually.     (Z87.39) History of gout  Plan: check uric acid; continue with low purine diet.     (E55.9) Vitamin D deficiency  Plan: check vit D.     (H17.9) Corneal scarring  Plan: pt had been treated with corneal transplant before but per pt has not been successful and another corneal transplant is being planned in near future per pt .    (Z12.11) Colon cancer screening  Plan:  Gastro Referral - In Network, Gastro Referral -        In Network        Pt referred to GI for colonoscpy    (Z12.5) Prostate cancer screening  Plan: PSA Total, Screen        Check psa.              The patient indicates understanding of these issues and agrees to the plan.  Reinforced healthy diet, lifestyle, and exercise.      No follow-ups on file.     Chaim Avila MD, 6/18/2025     Supplementary Documentation:   General Health:  In the past six months, have you lost more than 10 pounds without trying?: 2 - No  Has your appetite been poor?: No  Type of Diet: Balanced  How does the patient maintain a good energy level?: Other  How would you describe your daily physical activity?: None  How would you describe your current health state?: Fair  How do you maintain positive mental well-being?: Social Interaction  On a scale of 0 to 10, with 0 being no pain and 10 being severe pain, what is your pain level?: 1 - (Mild)  In the past six months, have you experienced urine leakage?: 0-No  At any time do you feel concerned for the safety/well-being of yourself and/or your children, in your home or elsewhere?: No  Have you had any immunizations at another office such as Influenza, Hepatitis B, Tetanus, or Pneumococcal?: No    Health Maintenance   Topic Date Due    Colorectal Cancer Screening  11/25/2022    COVID-19 Vaccine (4 - 2024-25 season) 09/01/2024    Annual Well Visit  Never done    Fall Risk Screening (Annual)  01/01/2025    Diabetes Care: Microalb/Creat Ratio (Annual)  01/01/2025    Diabetes Care A1C  04/24/2025    Diabetes Care Foot Exam  07/16/2025 (Originally 3/20/2018)    Diabetes Care Dilated Eye Exam  09/15/2025 (Originally 3/21/2017)    Diabetes Care: GFR  09/20/2025    PSA  05/03/2026    Influenza Vaccine  Completed    Annual Depression Screening  Completed    Pneumococcal Vaccine: 50+ Years  Completed    Zoster Vaccines  Completed    Meningococcal B Vaccine  Aged Out            [1]   Patient Active  Problem List  Diagnosis    LBBB (left bundle branch block)    History of gout    Prediabetes    Multiple thyroid nodules    History of permanent cardiac pacemaker placement    Primary hypertension    Mixed hyperlipidemia    Chronic gout without tophus    AV block, 3rd degree (HCC)    Prostate cancer screening    Controlled type 2 diabetes mellitus without complication, without long-term current use of insulin (HCC)    Vitamin D deficiency    Corneal scarring   [2]   Outpatient Medications Marked as Taking for the 6/18/25 encounter (Office Visit) with Chaim Avila MD   Medication Sig    triamcinolone 0.1 % External Cream Apply 1 Application topically 2 (two) times daily.    oseltamivir 75 MG Oral Cap Take 1 capsule (75 mg total) by mouth 2 (two) times daily.    ofloxacin 0.3 % Ophthalmic Solution Place 1 drop into the left eye 2 (two) times daily with meals.    prednisoLONE 1 % Ophthalmic Suspension Place 1 drop into the left eye in the morning, at noon, in the evening, and at bedtime.    guaiFENesin-codeine 100-10 MG/5ML Oral Solution Take 5 mL by mouth every 6 (six) hours as needed for cough.    benzonatate (TESSALON PERLES) 100 MG Oral Cap Take 1 capsule (100 mg total) by mouth 3 (three) times daily as needed for cough.    guaiFENesin-codeine 100-10 MG/5ML Oral Solution Take 5 mL by mouth every 6 (six) hours as needed.    Doxycycline Monohydrate 100 MG Oral Tab Take 100 mg by mouth 2 (two) times daily.    telmisartan 40 MG Oral Tab Take 1 tablet (40 mg total) by mouth nightly. FOR BLOOD PRESSURE    aspirin (ASPIRIN 81) 81 MG Oral Tab EC Take 1 tablet (81 mg total) by mouth in the morning.

## 2025-06-20 PROBLEM — I10 PRIMARY HYPERTENSION: Status: ACTIVE | Noted: 2022-11-09

## 2025-06-20 PROBLEM — Z00.00 MEDICARE ANNUAL WELLNESS VISIT, SUBSEQUENT: Status: ACTIVE | Noted: 2025-06-20

## 2025-06-20 PROBLEM — E55.9 VITAMIN D DEFICIENCY: Status: ACTIVE | Noted: 2025-06-20

## 2025-06-20 PROBLEM — Z12.5 PROSTATE CANCER SCREENING: Status: ACTIVE | Noted: 2025-06-20

## 2025-06-20 PROBLEM — Z12.11 COLON CANCER SCREENING: Status: ACTIVE | Noted: 2025-06-20

## 2025-06-20 PROBLEM — E11.9 CONTROLLED TYPE 2 DIABETES MELLITUS WITHOUT COMPLICATION, WITHOUT LONG-TERM CURRENT USE OF INSULIN (HCC): Status: ACTIVE | Noted: 2025-06-20

## 2025-06-20 PROBLEM — H17.9 CORNEAL SCARRING: Status: ACTIVE | Noted: 2025-06-20

## 2025-07-13 DIAGNOSIS — I10 HYPERTENSION GOAL BP (BLOOD PRESSURE) < 130/80: ICD-10-CM

## 2025-07-13 NOTE — TELEPHONE ENCOUNTER
Pharmacy requesting a refill.       telmisartan 40 MG Oral Tab, Take 1 tablet (40 mg total) by mouth nightly. FOR BLOOD PRESSURE, Disp: 90 tablet, Rfl: 3    (Originally prescribed by Dr. MEAGAN Birch)

## 2025-07-18 RX ORDER — TELMISARTAN 40 MG/1
40 TABLET ORAL NIGHTLY
Qty: 90 TABLET | Refills: 1 | Status: SHIPPED | OUTPATIENT
Start: 2025-07-18

## 2025-07-23 ENCOUNTER — NURSE ONLY (OUTPATIENT)
Dept: LAB | Age: 70
End: 2025-07-23
Attending: INTERNAL MEDICINE
Payer: COMMERCIAL

## 2025-07-23 ENCOUNTER — HOSPITAL ENCOUNTER (OUTPATIENT)
Dept: GENERAL RADIOLOGY | Age: 70
Discharge: HOME | End: 2025-07-23
Attending: INTERNAL MEDICINE
Payer: COMMERCIAL

## 2025-07-23 DIAGNOSIS — Z01.818 PRE-OP TESTING: ICD-10-CM

## 2025-07-23 LAB
ANION GAP SERPL CALC-SCNC: 8 MMOL/L (ref 0–18)
ATRIAL RATE: 74 BPM
BUN BLD-MCNC: 10 MG/DL (ref 9–23)
BUN/CREAT SERPL: 9 (ref 10–20)
CALCIUM BLD-MCNC: 8.5 MG/DL (ref 8.7–10.4)
CHLORIDE SERPL-SCNC: 99 MMOL/L (ref 98–112)
CO2 SERPL-SCNC: 27 MMOL/L (ref 21–32)
CREAT BLD-MCNC: 1.11 MG/DL (ref 0.7–1.3)
DEPRECATED RDW RBC AUTO: 39.9 FL (ref 35.1–46.3)
EGFRCR SERPLBLD CKD-EPI 2021: 71 ML/MIN/1.73M2 (ref 60–?)
ERYTHROCYTE [DISTWIDTH] IN BLOOD BY AUTOMATED COUNT: 12.9 % (ref 11–15)
FASTING STATUS PATIENT QL REPORTED: YES
GLUCOSE BLD-MCNC: 120 MG/DL (ref 70–99)
HCT VFR BLD AUTO: 40.3 % (ref 39–53)
HGB BLD-MCNC: 13.8 G/DL (ref 13–17.5)
MCH RBC QN AUTO: 29.4 PG (ref 26–34)
MCHC RBC AUTO-ENTMCNC: 34.2 G/DL (ref 31–37)
MCV RBC AUTO: 85.9 FL (ref 80–100)
MRSA DNA SPEC QL NAA+PROBE: NEGATIVE
OSMOLALITY SERPL CALC.SUM OF ELEC: 278 MOSM/KG (ref 275–295)
P AXIS: 55 DEGREES
P-R INTERVAL: 218 MS
PLATELET # BLD AUTO: 223 10(3)UL (ref 150–450)
POTASSIUM SERPL-SCNC: 4.1 MMOL/L (ref 3.5–5.1)
Q-T INTERVAL: 466 MS
QRS DURATION: 190 MS
QTC CALCULATION (BEZET): 517 MS
R AXIS: -76 DEGREES
RBC # BLD AUTO: 4.69 X10(6)UL (ref 3.8–5.8)
SODIUM SERPL-SCNC: 134 MMOL/L (ref 136–145)
T AXIS: 102 DEGREES
VENTRICULAR RATE: 74 BPM
WBC # BLD AUTO: 7.8 X10(3) UL (ref 4–11)

## 2025-07-23 PROCEDURE — 80048 BASIC METABOLIC PNL TOTAL CA: CPT

## 2025-07-23 PROCEDURE — 93005 ELECTROCARDIOGRAM TRACING: CPT

## 2025-07-23 PROCEDURE — 87641 MR-STAPH DNA AMP PROBE: CPT

## 2025-07-23 PROCEDURE — 71046 X-RAY EXAM CHEST 2 VIEWS: CPT | Performed by: INTERNAL MEDICINE

## 2025-07-23 PROCEDURE — 85027 COMPLETE CBC AUTOMATED: CPT

## 2025-07-23 PROCEDURE — 36415 COLL VENOUS BLD VENIPUNCTURE: CPT

## 2025-07-23 PROCEDURE — 93010 ELECTROCARDIOGRAM REPORT: CPT | Performed by: INTERNAL MEDICINE

## 2025-07-24 ENCOUNTER — TELEPHONE (OUTPATIENT)
Facility: LOCATION | Age: 70
End: 2025-07-24

## 2025-07-25 ENCOUNTER — LAB ENCOUNTER (OUTPATIENT)
Dept: LAB | Age: 70
End: 2025-07-25
Attending: INTERNAL MEDICINE
Payer: COMMERCIAL

## 2025-07-25 ENCOUNTER — OFFICE VISIT (OUTPATIENT)
Dept: INTERNAL MEDICINE CLINIC | Facility: CLINIC | Age: 70
End: 2025-07-25

## 2025-07-25 ENCOUNTER — HOSPITAL ENCOUNTER (OUTPATIENT)
Dept: GENERAL RADIOLOGY | Age: 70
Discharge: HOME | End: 2025-07-25
Attending: INTERNAL MEDICINE
Payer: COMMERCIAL

## 2025-07-25 ENCOUNTER — NURSE TRIAGE (OUTPATIENT)
Dept: INTERNAL MEDICINE CLINIC | Facility: CLINIC | Age: 70
End: 2025-07-25

## 2025-07-25 VITALS
WEIGHT: 206.31 LBS | DIASTOLIC BLOOD PRESSURE: 80 MMHG | HEART RATE: 70 BPM | BODY MASS INDEX: 31 KG/M2 | SYSTOLIC BLOOD PRESSURE: 128 MMHG

## 2025-07-25 DIAGNOSIS — T39.391A ALLERGIC REACTION TO NONSTEROIDAL ANTI-INFLAMMATORY DRUG (NSAID): ICD-10-CM

## 2025-07-25 DIAGNOSIS — M10.071 ACUTE IDIOPATHIC GOUT OF RIGHT FOOT: Primary | ICD-10-CM

## 2025-07-25 DIAGNOSIS — M10.071 ACUTE IDIOPATHIC GOUT OF RIGHT FOOT: ICD-10-CM

## 2025-07-25 LAB — URATE SERPL-MCNC: 7.3 MG/DL (ref 3.7–9.2)

## 2025-07-25 PROCEDURE — 1159F MED LIST DOCD IN RCRD: CPT | Performed by: INTERNAL MEDICINE

## 2025-07-25 PROCEDURE — 84550 ASSAY OF BLOOD/URIC ACID: CPT

## 2025-07-25 PROCEDURE — 3074F SYST BP LT 130 MM HG: CPT | Performed by: INTERNAL MEDICINE

## 2025-07-25 PROCEDURE — 3079F DIAST BP 80-89 MM HG: CPT | Performed by: INTERNAL MEDICINE

## 2025-07-25 PROCEDURE — 73600 X-RAY EXAM OF ANKLE: CPT | Performed by: INTERNAL MEDICINE

## 2025-07-25 PROCEDURE — 1160F RVW MEDS BY RX/DR IN RCRD: CPT | Performed by: INTERNAL MEDICINE

## 2025-07-25 PROCEDURE — 99214 OFFICE O/P EST MOD 30 MIN: CPT | Performed by: INTERNAL MEDICINE

## 2025-07-25 PROCEDURE — 36415 COLL VENOUS BLD VENIPUNCTURE: CPT

## 2025-07-25 RX ORDER — ALLOPURINOL 100 MG/1
100 TABLET ORAL DAILY
COMMUNITY

## 2025-07-25 RX ORDER — METHYLPREDNISOLONE 4 MG/1
TABLET ORAL
Qty: 1 EACH | Refills: 0 | Status: SHIPPED | OUTPATIENT
Start: 2025-07-25

## 2025-07-25 NOTE — TELEPHONE ENCOUNTER
Action Requested: Summary for Provider     []  Critical Lab, Recommendations Needed  [] Need Additional Advice  []   FYI    []   Need Orders  [] Need Medications Sent to Pharmacy  []  Other     SUMMARY:Pt requesting Appt, Gout flare up  right foot but seem to be something else , asked if leg is swollen , took naproxen 500 mg hrs later top lip swelling-took 1/2 Naprosen last night -Lip swollen this morning, notice quarter size darkness of hands, finger swelling  , \"language barrier\"  -advised with multiple s/s need ER eval - pt decline -begin to say lip not that swollen and leg not swollen but the foot   Please Advise- pt want to speak to      Reason for call: Gout  Onset:                   Reason for Disposition   Swollen foot  (Exceptions: Localized bump from bunions, calluses, insect bite, sting.)    Protocols used: Foot Pain-A-[place]

## 2025-07-25 NOTE — PROGRESS NOTES
Subjective:     Patient ID: Marco Antonio Tucker is a 69 year old male.    Pain and swelling of the right posterior calcaneus started about 5 days ago which pt thought was gout.  He does have history of gout in the past.   Pt started taking allopurinol 5 days ago but stop taking after 2 days.   Pt then started taking naproxen 500mg one tab am yesterday and 1/2 half tab pm.  He noted that his lips started to have swelling and also his fingers.  No swelling of tongue or throat.         History/Other:   Review of Systems   Constitutional: Negative.    HENT:          Lip swelling noted   Respiratory: Negative.     Cardiovascular: Negative.      Current Medications[1]  Allergies:Allergies[2]    Past Medical History[3]   Past Surgical History[4]   Family History[5]   Social History: Short Social Hx on File[6]     Objective:   Physical Exam  Constitutional:       General: He is not in acute distress.     Appearance: He is not ill-appearing, toxic-appearing or diaphoretic.   HENT:      Right Ear: Tympanic membrane, ear canal and external ear normal.      Left Ear: Tympanic membrane, ear canal and external ear normal.      Mouth/Throat:      Mouth: No angioedema.      Dentition: No gingival swelling.      Tongue: No lesions. Tongue does not deviate from midline.      Palate: No lesions.      Pharynx: Uvula midline. Pharyngeal swelling present. No oropharyngeal exudate, posterior oropharyngeal erythema or uvula swelling.        Comments: Upper lip was mildly edematous  Eyes:      General: No scleral icterus.        Right eye: No discharge.         Left eye: No discharge.      Conjunctiva/sclera: Conjunctivae normal.      Pupils: Pupils are equal, round, and reactive to light.   Neck:      Vascular: No carotid bruit.   Cardiovascular:      Rate and Rhythm: Normal rate and regular rhythm.      Heart sounds: Normal heart sounds. No murmur heard.  Pulmonary:      Effort: No respiratory distress.      Breath sounds: Normal breath sounds.  Stridor present. No wheezing or rales.   Musculoskeletal:      Cervical back: Normal range of motion and neck supple. No rigidity or tenderness.      Right lower leg: No edema.      Left lower leg: No edema.      Right ankle: Swelling present. No ecchymosis. Tenderness present over the lateral malleolus and medial malleolus. No proximal fibula tenderness. Decreased range of motion. Anterior drawer test negative. Normal pulse.        Legs:       Comments: Swelling on the right ankle joint   Tenderness on the posterior calcaneus   Lymphadenopathy:      Cervical: No cervical adenopathy.   Skin:     Coloration: Skin is not jaundiced.      Findings: Bruising present.      Comments: One small blister about 1cm on the the right hand   Neurological:      Mental Status: He is alert.         Assessment & Plan:   1. Acute idiopathic gout of right foot  Pt had taken naproxen for his gout but had to stop since had developed allergic reaction.  Will put him on medrol dospak. Pt to watch his glucose at least daily . Pt told not to take allopurinol only during acute flare of gout since it's a preventive med, not used for acute gout and may make it worse.   Will do check uric acid and xray of ankle.  Foot elevation and also cold cmpress .  - Uric Acid; Future  - XR ANKLE (2 VIEW), RIGHT (CPT=73600); Future    2. Allergic reaction to nonsteroidal anti-inflammatory drug (NSAID)  Upper lip angioedema and left 4th finger edema as well as some rash on his finger  started after taking naproxen. I told him not to take nsaids anymore from now on. Pt was given medrol dospak for his acute gout which should also help his allergic reaction, he is also to take otc claritin daily.   I had told pt to go to ER if symptoms persist/worsens .    No orders of the defined types were placed in this encounter.      Meds This Visit:  Requested Prescriptions      No prescriptions requested or ordered in this encounter       Imaging & Referrals:  None             [1]   Current Outpatient Medications   Medication Sig Dispense Refill    allopurinol 100 MG Oral Tab Take 1 tablet (100 mg total) by mouth daily.      telmisartan 40 MG Oral Tab Take 1 tablet (40 mg total) by mouth nightly. FOR BLOOD PRESSURE 90 tablet 1    azelastine 137 MCG/SPRAY Nasal Solution 2 sprays by Each Nare route as needed.      rosuvastatin 5 MG Oral Tab Take 1 tablet (5 mg total) by mouth nightly. 90 tablet 3    aspirin (ASPIRIN 81) 81 MG Oral Tab EC Take 1 tablet (81 mg total) by mouth in the morning. 90 tablet 9    albuterol (2.5 MG/3ML) 0.083% Inhalation Nebu Soln Take 3 mL (2.5 mg total) by nebulization every 6 (six) hours as needed for Wheezing or Shortness of Breath. (Patient not taking: Reported on 7/25/2025) 120 each 0    albuterol 108 (90 Base) MCG/ACT Inhalation Aero Soln Inhale 1 puff into the lungs every 6 (six) hours as needed for Wheezing. (Patient not taking: Reported on 7/25/2025) 8.5 each 1   [2]   Allergies  Allergen Reactions    Atarax [Hydroxyzine] FATIGUE    Shrimp OTHER (SEE COMMENTS)     Gout flare.    [3]   Past Medical History:   AV block, 3rd degree (HCC)    Cervical spondylosis    Controlled type 2 diabetes mellitus without complication, without long-term current use of insulin (HCC)    Essential hypertension    Gout    High blood pressure    Hyperlipidemia    LBBB (left bundle branch block)    Osteoarthritis    Prediabetes    Unspecified essential hypertension   [4]   Past Surgical History:  Procedure Laterality Date    Cardiac catheterization      2015 normal coronary arteries on angiogram    Cardiac pacemaker placement      Colonoscopy N/A 11/25/2017    Procedure: COLONOSCOPY;  Surgeon: Romeo Kim MD;  Location: [facility] ENDOSCOPY    Corneal transplant,lamellar      2023    Other surgical history  10/2015    pacemaker   [5]   Family History  Problem Relation Age of Onset    Diabetes Father     Diabetes Mother     Hypertension Mother     Heart Disorder Mother      Cancer Sister         breast cancer    Diabetes Brother    [6]   Social History  Socioeconomic History    Marital status:    Tobacco Use    Smoking status: Never     Passive exposure: Never    Smokeless tobacco: Never   Vaping Use    Vaping status: Never Used   Substance and Sexual Activity    Alcohol use: No     Alcohol/week: 0.0 standard drinks of alcohol    Drug use: No   Other Topics Concern    Caffeine Concern Yes     Comment: coffee, 2 or more cups daily    Exercise No    Pt has a pacemaker Yes    Pt has a defibrillator No    Reaction to local anesthetic No   Social History Narrative    The patient does not use an assistive device..      The patient does live in a home with stairs.        Lives with wife     Drives a taxi for work    Born in [facility]

## 2025-07-31 ENCOUNTER — APPOINTMENT (OUTPATIENT)
Dept: GENERAL RADIOLOGY | Facility: HOSPITAL | Age: 70
End: 2025-07-31
Attending: INTERNAL MEDICINE

## 2025-07-31 ENCOUNTER — HOSPITAL ENCOUNTER (OUTPATIENT)
Dept: INTERVENTIONAL RADIOLOGY/VASCULAR | Facility: HOSPITAL | Age: 70
Discharge: HOME | End: 2025-07-31
Attending: INTERNAL MEDICINE | Admitting: INTERNAL MEDICINE

## 2025-07-31 VITALS
BODY MASS INDEX: 30.01 KG/M2 | WEIGHT: 198 LBS | TEMPERATURE: 98 F | OXYGEN SATURATION: 94 % | RESPIRATION RATE: 19 BRPM | SYSTOLIC BLOOD PRESSURE: 128 MMHG | DIASTOLIC BLOOD PRESSURE: 75 MMHG | HEART RATE: 86 BPM | HEIGHT: 68 IN

## 2025-07-31 DIAGNOSIS — Z01.818 PRE-OP TESTING: Primary | ICD-10-CM

## 2025-07-31 DIAGNOSIS — Z45.018 PACEMAKER END OF LIFE: ICD-10-CM

## 2025-07-31 DIAGNOSIS — T82.198A MALFUNCTION OF ELECTRODE LEAD OF CARDIAC PACEMAKER: ICD-10-CM

## 2025-07-31 PROCEDURE — 33233 REMOVAL OF PM GENERATOR: CPT | Performed by: INTERNAL MEDICINE

## 2025-07-31 PROCEDURE — 33206 INSERT HEART PM ATRIAL: CPT | Performed by: INTERNAL MEDICINE

## 2025-07-31 PROCEDURE — 99152 MOD SED SAME PHYS/QHP 5/>YRS: CPT | Performed by: INTERNAL MEDICINE

## 2025-07-31 PROCEDURE — 99153 MOD SED SAME PHYS/QHP EA: CPT | Performed by: INTERNAL MEDICINE

## 2025-07-31 PROCEDURE — 71045 X-RAY EXAM CHEST 1 VIEW: CPT | Performed by: INTERNAL MEDICINE

## 2025-07-31 RX ORDER — IOPAMIDOL 612 MG/ML
30 INJECTION, SOLUTION INTRAVASCULAR
Status: DISCONTINUED | OUTPATIENT
Start: 2025-07-31 | End: 2025-07-31

## 2025-07-31 RX ORDER — MIDAZOLAM HYDROCHLORIDE 1 MG/ML
INJECTION INTRAMUSCULAR; INTRAVENOUS
Status: COMPLETED
Start: 2025-07-31 | End: 2025-07-31

## 2025-07-31 RX ORDER — LIDOCAINE HYDROCHLORIDE AND EPINEPHRINE 10; 10 MG/ML; UG/ML
INJECTION, SOLUTION INFILTRATION; PERINEURAL
Status: COMPLETED
Start: 2025-07-31 | End: 2025-07-31

## 2025-07-31 RX ORDER — SODIUM CHLORIDE 9 MG/ML
INJECTION, SOLUTION INTRAVENOUS
Status: DISCONTINUED | OUTPATIENT
Start: 2025-07-31 | End: 2025-07-31

## 2025-07-31 RX ORDER — CHLORHEXIDINE GLUCONATE 40 MG/ML
SOLUTION TOPICAL
Status: COMPLETED | OUTPATIENT
Start: 2025-07-31 | End: 2025-07-31

## 2025-07-31 RX ORDER — ONDANSETRON 2 MG/ML
4 INJECTION INTRAMUSCULAR; INTRAVENOUS EVERY 6 HOURS PRN
Status: DISCONTINUED | OUTPATIENT
Start: 2025-07-31 | End: 2025-07-31

## 2025-07-31 RX ORDER — MIDAZOLAM HYDROCHLORIDE 1 MG/ML
INJECTION INTRAMUSCULAR; INTRAVENOUS
Status: DISCONTINUED
Start: 2025-07-31 | End: 2025-07-31 | Stop reason: WASHOUT

## 2025-07-31 RX ADMIN — CHLORHEXIDINE GLUCONATE: 40 SOLUTION TOPICAL at 12:00:00

## 2025-08-04 ENCOUNTER — OFFICE VISIT (OUTPATIENT)
Dept: INTERNAL MEDICINE CLINIC | Facility: CLINIC | Age: 70
End: 2025-08-04

## 2025-08-04 ENCOUNTER — LAB ENCOUNTER (OUTPATIENT)
Dept: LAB | Age: 70
End: 2025-08-04
Attending: INTERNAL MEDICINE

## 2025-08-04 VITALS
DIASTOLIC BLOOD PRESSURE: 70 MMHG | BODY MASS INDEX: 30 KG/M2 | SYSTOLIC BLOOD PRESSURE: 120 MMHG | WEIGHT: 200.25 LBS | HEART RATE: 74 BPM

## 2025-08-04 DIAGNOSIS — E78.2 MIXED HYPERLIPIDEMIA: ICD-10-CM

## 2025-08-04 DIAGNOSIS — Z01.818 PREOP GENERAL PHYSICAL EXAM: ICD-10-CM

## 2025-08-04 DIAGNOSIS — I44.2 AV BLOCK, 3RD DEGREE (HCC): ICD-10-CM

## 2025-08-04 DIAGNOSIS — Z95.0 S/P PLACEMENT OF CARDIAC PACEMAKER: ICD-10-CM

## 2025-08-04 DIAGNOSIS — H17.9 CORNEAL SCARRING: ICD-10-CM

## 2025-08-04 DIAGNOSIS — E11.9 CONTROLLED TYPE 2 DIABETES MELLITUS WITHOUT COMPLICATION, WITHOUT LONG-TERM CURRENT USE OF INSULIN (HCC): ICD-10-CM

## 2025-08-04 DIAGNOSIS — M10.071 ACUTE IDIOPATHIC GOUT OF RIGHT FOOT: ICD-10-CM

## 2025-08-04 DIAGNOSIS — Z01.818 PREOP GENERAL PHYSICAL EXAM: Primary | ICD-10-CM

## 2025-08-04 DIAGNOSIS — I10 PRIMARY HYPERTENSION: ICD-10-CM

## 2025-08-04 LAB
BILIRUB UR QL: NEGATIVE
CLARITY UR: CLEAR
GLUCOSE UR-MCNC: NORMAL MG/DL
HGB UR QL STRIP.AUTO: NEGATIVE
KETONES UR-MCNC: NEGATIVE MG/DL
LEUKOCYTE ESTERASE UR QL STRIP.AUTO: NEGATIVE
NITRITE UR QL STRIP.AUTO: NEGATIVE
PH UR: 7 (ref 5–8)
PROT UR-MCNC: NEGATIVE MG/DL
SP GR UR STRIP: 1.01 (ref 1–1.03)
UROBILINOGEN UR STRIP-ACNC: NORMAL

## 2025-08-04 PROCEDURE — 1160F RVW MEDS BY RX/DR IN RCRD: CPT | Performed by: INTERNAL MEDICINE

## 2025-08-04 PROCEDURE — 3074F SYST BP LT 130 MM HG: CPT | Performed by: INTERNAL MEDICINE

## 2025-08-04 PROCEDURE — 99214 OFFICE O/P EST MOD 30 MIN: CPT | Performed by: INTERNAL MEDICINE

## 2025-08-04 PROCEDURE — 3078F DIAST BP <80 MM HG: CPT | Performed by: INTERNAL MEDICINE

## 2025-08-04 PROCEDURE — 81003 URINALYSIS AUTO W/O SCOPE: CPT

## 2025-08-04 PROCEDURE — 1159F MED LIST DOCD IN RCRD: CPT | Performed by: INTERNAL MEDICINE

## 2025-08-04 RX ORDER — METHYLPREDNISOLONE 4 MG/1
TABLET ORAL
Qty: 1 EACH | Refills: 0 | Status: SHIPPED | OUTPATIENT
Start: 2025-08-04

## 2025-08-11 ENCOUNTER — HOSPITAL ENCOUNTER (OUTPATIENT)
Age: 70
Discharge: HOME | End: 2025-08-11

## 2025-08-11 ENCOUNTER — NURSE TRIAGE (OUTPATIENT)
Dept: INTERNAL MEDICINE CLINIC | Facility: CLINIC | Age: 70
End: 2025-08-11

## 2025-08-11 VITALS
BODY MASS INDEX: 30.31 KG/M2 | OXYGEN SATURATION: 99 % | SYSTOLIC BLOOD PRESSURE: 158 MMHG | WEIGHT: 200 LBS | HEART RATE: 70 BPM | DIASTOLIC BLOOD PRESSURE: 76 MMHG | TEMPERATURE: 98 F | RESPIRATION RATE: 20 BRPM | HEIGHT: 68 IN

## 2025-08-11 DIAGNOSIS — M10.072 ACUTE IDIOPATHIC GOUT INVOLVING TOE OF LEFT FOOT: Primary | ICD-10-CM

## 2025-08-11 DIAGNOSIS — R11.2 NAUSEA AND VOMITING, UNSPECIFIED VOMITING TYPE: ICD-10-CM

## 2025-08-11 PROCEDURE — S0119 ONDANSETRON 4 MG: HCPCS | Performed by: PHYSICIAN ASSISTANT

## 2025-08-11 PROCEDURE — 99213 OFFICE O/P EST LOW 20 MIN: CPT | Performed by: PHYSICIAN ASSISTANT

## 2025-08-11 RX ORDER — ONDANSETRON 4 MG/1
4 TABLET, ORALLY DISINTEGRATING ORAL EVERY 8 HOURS PRN
Qty: 10 TABLET | Refills: 0 | Status: SHIPPED | OUTPATIENT
Start: 2025-08-11

## 2025-08-11 RX ORDER — PREDNISONE 20 MG/1
40 TABLET ORAL DAILY
Qty: 10 TABLET | Refills: 0 | Status: SHIPPED | OUTPATIENT
Start: 2025-08-11 | End: 2025-08-16

## 2025-08-11 RX ORDER — ONDANSETRON 4 MG/1
4 TABLET, ORALLY DISINTEGRATING ORAL ONCE
Status: COMPLETED | OUTPATIENT
Start: 2025-08-11 | End: 2025-08-11

## 2025-08-12 SDOH — SOCIAL STABILITY: SOCIAL INSECURITY: HOW OFTEN DOES ANYONE, INCLUDING FAMILY AND FRIENDS, THREATEN YOU WITH HARM?: NEVER

## 2025-08-12 SDOH — SOCIAL STABILITY: SOCIAL INSECURITY: HOW OFTEN DOES ANYONE, INCLUDING FAMILY AND FRIENDS, SCREAM OR CURSE AT YOU?: NEVER

## 2025-08-12 SDOH — SOCIAL STABILITY: SOCIAL INSECURITY: HOW OFTEN DOES ANYONE, INCLUDING FAMILY AND FRIENDS, INSULT OR TALK DOWN TO YOU?: NEVER

## 2025-08-12 SDOH — SOCIAL STABILITY: SOCIAL INSECURITY: HOW OFTEN DOES ANYONE, INCLUDING FAMILY AND FRIENDS, PHYSICALLY HURT YOU?: NEVER

## 2025-08-12 ASSESSMENT — ACTIVITIES OF DAILY LIVING (ADL)
SENSORY_SUPPORT_DEVICES: EYEGLASSES
CHRONIC_PAIN_PRESENT: NO
ADL_BEFORE_ADMISSION: INDEPENDENT
ADL_SCORE: 12
HISTORY OF FALLING IN THE LAST YEAR (PRIOR TO ADMISSION): NO
ADL_SHORT_OF_BREATH: NO
RECENT_DECLINE_ADL: NO
NEEDS_ASSIST: NO

## 2025-08-14 ENCOUNTER — MED REC SCAN ONLY (OUTPATIENT)
Dept: INTERNAL MEDICINE CLINIC | Facility: CLINIC | Age: 70
End: 2025-08-14

## 2025-08-15 ENCOUNTER — ANESTHESIA EVENT (OUTPATIENT)
Dept: SURGERY | Age: 70
End: 2025-08-15

## 2025-08-15 ENCOUNTER — HOSPITAL ENCOUNTER (OUTPATIENT)
Age: 70
Discharge: HOME | End: 2025-08-15
Attending: OPHTHALMOLOGY | Admitting: OPHTHALMOLOGY

## 2025-08-15 ENCOUNTER — ANESTHESIA (OUTPATIENT)
Dept: SURGERY | Age: 70
End: 2025-08-15

## 2025-08-15 LAB
GLUCOSE BLDC GLUCOMTR-MCNC: 103 MG/DL (ref 70–99)
GLUCOSE BLDC GLUCOMTR-MCNC: 128 MG/DL (ref 70–99)
GLUCOSE BLDC GLUCOMTR-MCNC: 85 MG/DL (ref 70–99)

## 2025-08-15 PROCEDURE — 10002803 HB RX 637: Performed by: OPHTHALMOLOGY

## 2025-08-15 PROCEDURE — V2785 CORNEAL TISSUE PROCESSING: HCPCS | Performed by: OPHTHALMOLOGY

## 2025-08-15 PROCEDURE — 10002800 HB RX 250 W HCPCS: Performed by: ANESTHESIOLOGY

## 2025-08-15 PROCEDURE — 10006027 HB SUPPLY 278: Performed by: OPHTHALMOLOGY

## 2025-08-15 PROCEDURE — 10002800 HB RX 250 W HCPCS: Performed by: OPHTHALMOLOGY

## 2025-08-15 PROCEDURE — 82962 GLUCOSE BLOOD TEST: CPT

## 2025-08-15 PROCEDURE — 10002801 HB RX 250 W/O HCPCS: Performed by: ANESTHESIOLOGY

## 2025-08-15 PROCEDURE — 87070 CULTURE OTHR SPECIMN AEROBIC: CPT | Performed by: OPHTHALMOLOGY

## 2025-08-15 PROCEDURE — 10002807 HB RX 258: Performed by: OPHTHALMOLOGY

## 2025-08-15 PROCEDURE — 13000002 HB ANESTHESIA  GENERAL   S/U + 1ST 15 MIN: Performed by: OPHTHALMOLOGY

## 2025-08-15 PROCEDURE — 13000003 HB ANESTHESIA  GENERAL EA ADD MINUTE: Performed by: OPHTHALMOLOGY

## 2025-08-15 PROCEDURE — 10004451 HB PACU RECOVERY 1ST 30 MINUTES: Performed by: OPHTHALMOLOGY

## 2025-08-15 PROCEDURE — 13000038 HB MAJOR COMPLEX CASE S/U + 1ST 15 MIN: Performed by: OPHTHALMOLOGY

## 2025-08-15 PROCEDURE — 13000039 HB MAJOR COMPLEX CASE EA ADD MINUTE: Performed by: OPHTHALMOLOGY

## 2025-08-15 PROCEDURE — 10006023 HB SUPPLY 272: Performed by: OPHTHALMOLOGY

## 2025-08-15 PROCEDURE — 10004452 HB PACU ADDL 30 MINUTES: Performed by: OPHTHALMOLOGY

## 2025-08-15 PROCEDURE — 10002801 HB RX 250 W/O HCPCS: Performed by: OPHTHALMOLOGY

## 2025-08-15 PROCEDURE — 10002803 HB RX 637

## 2025-08-15 PROCEDURE — 13000001 HB PHASE II RECOVERY EA 30 MINUTES: Performed by: OPHTHALMOLOGY

## 2025-08-15 DEVICE — IMPLANTABLE DEVICE: Type: IMPLANTABLE DEVICE | Site: EYE | Status: FUNCTIONAL

## 2025-08-15 RX ORDER — NICOTINE POLACRILEX 4 MG
30 LOZENGE BUCCAL
Status: DISCONTINUED | OUTPATIENT
Start: 2025-08-15 | End: 2025-08-15 | Stop reason: HOSPADM

## 2025-08-15 RX ORDER — CHLORHEXIDINE GLUCONATE ORAL RINSE 1.2 MG/ML
15 SOLUTION DENTAL ONCE
Status: COMPLETED | OUTPATIENT
Start: 2025-08-15 | End: 2025-08-15

## 2025-08-15 RX ORDER — NEOSTIGMINE METHYLSULFATE 1 MG/ML
INJECTION INTRAVENOUS PRN
Status: DISCONTINUED | OUTPATIENT
Start: 2025-08-15 | End: 2025-08-15

## 2025-08-15 RX ORDER — GLYCOPYRROLATE 0.2 MG/ML
INJECTION, SOLUTION INTRAMUSCULAR; INTRAVENOUS PRN
Status: DISCONTINUED | OUTPATIENT
Start: 2025-08-15 | End: 2025-08-15

## 2025-08-15 RX ORDER — 0.9 % SODIUM CHLORIDE 0.9 %
2 VIAL (ML) INJECTION EVERY 12 HOURS SCHEDULED
Status: DISCONTINUED | OUTPATIENT
Start: 2025-08-15 | End: 2025-08-15 | Stop reason: HOSPADM

## 2025-08-15 RX ORDER — MIDAZOLAM HYDROCHLORIDE 1 MG/ML
INJECTION, SOLUTION INTRAMUSCULAR; INTRAVENOUS PRN
Status: DISCONTINUED | OUTPATIENT
Start: 2025-08-15 | End: 2025-08-15

## 2025-08-15 RX ORDER — 0.9 % SODIUM CHLORIDE 0.9 %
10 VIAL (ML) INJECTION PRN
Status: DISCONTINUED | OUTPATIENT
Start: 2025-08-15 | End: 2025-08-15 | Stop reason: HOSPADM

## 2025-08-15 RX ORDER — PREDNISOLONE ACETATE 10 MG/ML
1 SUSPENSION/ DROPS OPHTHALMIC 4 TIMES DAILY
Status: DISCONTINUED | OUTPATIENT
Start: 2025-08-16 | End: 2025-08-15 | Stop reason: HOSPADM

## 2025-08-15 RX ORDER — DEXTROSE MONOHYDRATE 25 G/50ML
25 INJECTION, SOLUTION INTRAVENOUS PRN
Status: DISCONTINUED | OUTPATIENT
Start: 2025-08-15 | End: 2025-08-15 | Stop reason: HOSPADM

## 2025-08-15 RX ORDER — ETOMIDATE 2 MG/ML
INJECTION INTRAVENOUS PRN
Status: DISCONTINUED | OUTPATIENT
Start: 2025-08-15 | End: 2025-08-15

## 2025-08-15 RX ORDER — LIDOCAINE HYDROCHLORIDE 10 MG/ML
INJECTION, SOLUTION INFILTRATION; PERINEURAL PRN
Status: DISCONTINUED | OUTPATIENT
Start: 2025-08-15 | End: 2025-08-15 | Stop reason: HOSPADM

## 2025-08-15 RX ORDER — DEXTROSE MONOHYDRATE 25 G/50ML
INJECTION, SOLUTION INTRAVENOUS PRN
Status: DISCONTINUED | OUTPATIENT
Start: 2025-08-15 | End: 2025-08-15

## 2025-08-15 RX ORDER — HYDRALAZINE HYDROCHLORIDE 20 MG/ML
5 INJECTION INTRAMUSCULAR; INTRAVENOUS EVERY 10 MIN PRN
Status: DISCONTINUED | OUTPATIENT
Start: 2025-08-15 | End: 2025-08-15 | Stop reason: HOSPADM

## 2025-08-15 RX ORDER — DEXAMETHASONE SODIUM PHOSPHATE 4 MG/ML
INJECTION, SOLUTION INTRA-ARTICULAR; INTRALESIONAL; INTRAMUSCULAR; INTRAVENOUS; SOFT TISSUE PRN
Status: DISCONTINUED | OUTPATIENT
Start: 2025-08-15 | End: 2025-08-15

## 2025-08-15 RX ORDER — KETOROLAC TROMETHAMINE 5 MG/ML
1 SOLUTION OPHTHALMIC 4 TIMES DAILY
Status: DISCONTINUED | OUTPATIENT
Start: 2025-08-16 | End: 2025-08-15 | Stop reason: HOSPADM

## 2025-08-15 RX ORDER — PREDNISOLONE ACETATE 10 MG/ML
SUSPENSION/ DROPS OPHTHALMIC PRN
Status: DISCONTINUED | OUTPATIENT
Start: 2025-08-15 | End: 2025-08-15 | Stop reason: HOSPADM

## 2025-08-15 RX ORDER — ONDANSETRON 2 MG/ML
INJECTION INTRAMUSCULAR; INTRAVENOUS PRN
Status: DISCONTINUED | OUTPATIENT
Start: 2025-08-15 | End: 2025-08-15

## 2025-08-15 RX ORDER — GENTAMICIN 40 MG/ML
INJECTION, SOLUTION INTRAMUSCULAR; INTRAVENOUS PRN
Status: DISCONTINUED | OUTPATIENT
Start: 2025-08-15 | End: 2025-08-15 | Stop reason: HOSPADM

## 2025-08-15 RX ORDER — DEXAMETHASONE SODIUM PHOSPHATE 4 MG/ML
INJECTION, SOLUTION INTRA-ARTICULAR; INTRALESIONAL; INTRAMUSCULAR; INTRAVENOUS; SOFT TISSUE PRN
Status: DISCONTINUED | OUTPATIENT
Start: 2025-08-15 | End: 2025-08-15 | Stop reason: HOSPADM

## 2025-08-15 RX ORDER — PROPOFOL 10 MG/ML
INJECTION, EMULSION INTRAVENOUS PRN
Status: DISCONTINUED | OUTPATIENT
Start: 2025-08-15 | End: 2025-08-15

## 2025-08-15 RX ORDER — LIDOCAINE HYDROCHLORIDE 20 MG/ML
INJECTION, SOLUTION INFILTRATION; PERINEURAL PRN
Status: DISCONTINUED | OUTPATIENT
Start: 2025-08-15 | End: 2025-08-15

## 2025-08-15 RX ORDER — ACETAMINOPHEN 325 MG/1
650 TABLET ORAL EVERY 4 HOURS PRN
Status: DISCONTINUED | OUTPATIENT
Start: 2025-08-15 | End: 2025-08-15 | Stop reason: HOSPADM

## 2025-08-15 RX ORDER — ROCURONIUM BROMIDE 10 MG/ML
INJECTION, SOLUTION INTRAVENOUS PRN
Status: DISCONTINUED | OUTPATIENT
Start: 2025-08-15 | End: 2025-08-15

## 2025-08-15 RX ORDER — OFLOXACIN 3 MG/ML
SOLUTION/ DROPS OPHTHALMIC PRN
Status: DISCONTINUED | OUTPATIENT
Start: 2025-08-15 | End: 2025-08-15 | Stop reason: HOSPADM

## 2025-08-15 RX ORDER — PILOCARPINE HYDROCHLORIDE 20 MG/ML
1 SOLUTION/ DROPS OPHTHALMIC
Status: COMPLETED | OUTPATIENT
Start: 2025-08-15 | End: 2025-08-15

## 2025-08-15 RX ORDER — GENTAMICIN 40 MG/ML
80 INJECTION, SOLUTION INTRAMUSCULAR; INTRAVENOUS ONCE
Status: DISCONTINUED | OUTPATIENT
Start: 2025-08-15 | End: 2025-08-15 | Stop reason: HOSPADM

## 2025-08-15 RX ORDER — OFLOXACIN 3 MG/ML
1 SOLUTION/ DROPS OPHTHALMIC
Status: COMPLETED | OUTPATIENT
Start: 2025-08-15 | End: 2025-08-15

## 2025-08-15 RX ADMIN — OFLOXACIN 1 DROP: 3 SOLUTION/ DROPS OPHTHALMIC at 07:52

## 2025-08-15 RX ADMIN — MIDAZOLAM HYDROCHLORIDE 2 MG: 1 INJECTION, SOLUTION INTRAMUSCULAR; INTRAVENOUS at 09:25

## 2025-08-15 RX ADMIN — FENTANYL CITRATE 50 MCG: 50 INJECTION INTRAMUSCULAR; INTRAVENOUS at 09:30

## 2025-08-15 RX ADMIN — DEXAMETHASONE SODIUM PHOSPHATE 4 MG: 4 INJECTION INTRA-ARTICULAR; INTRALESIONAL; INTRAMUSCULAR; INTRAVENOUS; SOFT TISSUE at 09:34

## 2025-08-15 RX ADMIN — FENTANYL CITRATE 50 MCG: 50 INJECTION INTRAMUSCULAR; INTRAVENOUS at 09:25

## 2025-08-15 RX ADMIN — NEOSTIGMINE METHYLSULFATE 4 MG: 1 INJECTION INTRAVENOUS at 10:43

## 2025-08-15 RX ADMIN — DEXTROSE MONOHYDRATE 10 ML: 25 INJECTION, SOLUTION INTRAVENOUS at 10:11

## 2025-08-15 RX ADMIN — ROCURONIUM BROMIDE 10 MG: 10 INJECTION INTRAVENOUS at 09:30

## 2025-08-15 RX ADMIN — SUCCINYLCHOLINE CHLORIDE 100 MG: 20 INJECTION, SOLUTION INTRAMUSCULAR; INTRAVENOUS at 09:31

## 2025-08-15 RX ADMIN — SODIUM CHLORIDE 350 ML: 9 INJECTION, SOLUTION INTRAVENOUS at 11:13

## 2025-08-15 RX ADMIN — ONDANSETRON 4 MG: 2 INJECTION INTRAMUSCULAR; INTRAVENOUS at 09:34

## 2025-08-15 RX ADMIN — PILOCARPINE HYDROCHLORIDE OPHTHALMIC SOLUTION 1 DROP: 20 SOLUTION/ DROPS OPHTHALMIC at 07:52

## 2025-08-15 RX ADMIN — LIDOCAINE HYDROCHLORIDE 2 ML: 20 INJECTION, SOLUTION INFILTRATION; PERINEURAL at 09:30

## 2025-08-15 RX ADMIN — DEXTROSE MONOHYDRATE 10 ML: 25 INJECTION, SOLUTION INTRAVENOUS at 09:44

## 2025-08-15 RX ADMIN — PILOCARPINE HYDROCHLORIDE OPHTHALMIC SOLUTION 1 DROP: 20 SOLUTION/ DROPS OPHTHALMIC at 08:04

## 2025-08-15 RX ADMIN — CHLORHEXIDINE GLUCONATE 15 ML: 1.2 RINSE ORAL at 07:54

## 2025-08-15 RX ADMIN — GLYCOPYRROLATE 0.6 MG: 0.2 INJECTION INTRAMUSCULAR; INTRAVENOUS at 10:43

## 2025-08-15 RX ADMIN — OFLOXACIN 1 DROP: 3 SOLUTION/ DROPS OPHTHALMIC at 08:03

## 2025-08-15 RX ADMIN — PILOCARPINE HYDROCHLORIDE OPHTHALMIC SOLUTION 1 DROP: 20 SOLUTION/ DROPS OPHTHALMIC at 08:14

## 2025-08-15 RX ADMIN — PROPOFOL 100 MG: 10 INJECTION, EMULSION INTRAVENOUS at 09:30

## 2025-08-15 RX ADMIN — SUGAMMADEX 200 MG: 100 INJECTION, SOLUTION INTRAVENOUS at 10:58

## 2025-08-15 RX ADMIN — OFLOXACIN 1 DROP: 3 SOLUTION/ DROPS OPHTHALMIC at 08:14

## 2025-08-15 RX ADMIN — ROCURONIUM BROMIDE 40 MG: 10 INJECTION INTRAVENOUS at 09:44

## 2025-08-15 RX ADMIN — ETOMIDATE 14 MG: 2 INJECTION, SOLUTION INTRAVENOUS at 09:30

## 2025-08-15 SDOH — SOCIAL STABILITY: SOCIAL INSECURITY: RISK FACTORS: BMI> 30 (OBESITY)

## 2025-08-15 SDOH — SOCIAL STABILITY: SOCIAL INSECURITY: RISK FACTORS: SLEEP APNEA

## 2025-08-15 SDOH — SOCIAL STABILITY: SOCIAL INSECURITY: RISK FACTORS: HEART DISEASE

## 2025-08-15 SDOH — SOCIAL STABILITY: SOCIAL INSECURITY: RISK FACTORS: AGE

## 2025-08-15 ASSESSMENT — PAIN SCALES - GENERAL
PAINLEVEL_OUTOF10: 0

## 2025-08-15 ASSESSMENT — PAIN SCALES - WONG BAKER
WONGBAKER_NUMERICALRESPONSE: 0

## 2025-08-15 ASSESSMENT — ENCOUNTER SYMPTOMS: EXERCISE TOLERANCE: GOOD (>4 METS)

## 2025-08-16 VITALS
BODY MASS INDEX: 30.31 KG/M2 | SYSTOLIC BLOOD PRESSURE: 123 MMHG | DIASTOLIC BLOOD PRESSURE: 70 MMHG | HEART RATE: 60 BPM | HEIGHT: 68 IN | OXYGEN SATURATION: 93 % | TEMPERATURE: 97.3 F | RESPIRATION RATE: 16 BRPM | WEIGHT: 200 LBS

## 2025-08-17 LAB
BACTERIA EYE ANAEROBE+AEROBE CULT: NORMAL
GRAM STN SPEC: NORMAL

## 2025-08-21 ENCOUNTER — OFFICE VISIT (OUTPATIENT)
Dept: INTERNAL MEDICINE CLINIC | Facility: CLINIC | Age: 70
End: 2025-08-21

## 2025-08-21 VITALS
HEIGHT: 68 IN | BODY MASS INDEX: 29.86 KG/M2 | TEMPERATURE: 98 F | WEIGHT: 197 LBS | OXYGEN SATURATION: 96 % | DIASTOLIC BLOOD PRESSURE: 84 MMHG | HEART RATE: 70 BPM | SYSTOLIC BLOOD PRESSURE: 134 MMHG

## 2025-08-21 DIAGNOSIS — I10 PRIMARY HYPERTENSION: ICD-10-CM

## 2025-08-21 DIAGNOSIS — M10.9 ACUTE GOUT INVOLVING TOE OF LEFT FOOT, UNSPECIFIED CAUSE: Primary | ICD-10-CM

## 2025-08-21 PROCEDURE — 3008F BODY MASS INDEX DOCD: CPT | Performed by: INTERNAL MEDICINE

## 2025-08-21 PROCEDURE — 99214 OFFICE O/P EST MOD 30 MIN: CPT | Performed by: INTERNAL MEDICINE

## 2025-08-21 PROCEDURE — 1125F AMNT PAIN NOTED PAIN PRSNT: CPT | Performed by: INTERNAL MEDICINE

## 2025-08-21 PROCEDURE — 3075F SYST BP GE 130 - 139MM HG: CPT | Performed by: INTERNAL MEDICINE

## 2025-08-21 PROCEDURE — 1159F MED LIST DOCD IN RCRD: CPT | Performed by: INTERNAL MEDICINE

## 2025-08-21 PROCEDURE — 3079F DIAST BP 80-89 MM HG: CPT | Performed by: INTERNAL MEDICINE

## 2025-08-21 RX ORDER — METHYLPREDNISOLONE 4 MG/1
TABLET ORAL
Qty: 1 EACH | Refills: 0 | Status: SHIPPED | OUTPATIENT
Start: 2025-08-21

## 2025-08-21 RX ORDER — ALLOPURINOL 100 MG/1
100 TABLET ORAL DAILY
Qty: 30 TABLET | Refills: 0 | Status: SHIPPED | OUTPATIENT
Start: 2025-08-21

## 2025-08-21 RX ORDER — COLCHICINE 0.6 MG/1
TABLET ORAL
Qty: 60 TABLET | Refills: 0 | Status: SHIPPED | OUTPATIENT
Start: 2025-08-21

## 2025-08-24 LAB
BACTERIA EYE ANAEROBE+AEROBE CULT: NORMAL
GRAM STN SPEC: NORMAL

## 2025-08-29 LAB
BACTERIA EYE ANAEROBE+AEROBE CULT: NORMAL
GRAM STN SPEC: NORMAL

## (undated) DEVICE — Device

## (undated) DEVICE — HANDPIECE IRR 1.8MM CAPSULEGUARD 2.2-2.8MM SIL ADH SMTH PORT

## (undated) DEVICE — ENDOSCOPY PACK - LOWER: Brand: MEDLINE INDUSTRIES, INC.

## (undated) DEVICE — DEVICE VSCELAS 27GA VISCOAT 4% CHONDROITIN SLF 3% SOD HALUR

## (undated) DEVICE — TOWEL L26 IN X W15 IN TIBURON NONABSORBENT DRAPE ADH STRIP

## (undated) DEVICE — SOLUTION IRR 1000ML 0.9% NACL PVC FREE DEHP-FR NONDRIP POUR

## (undated) DEVICE — GLOVE SURG 8 PROTEXIS LF CRM PF BEAD CUFF STRL PLISPRN 12IN

## (undated) DEVICE — PEN SURGMRK DEVON SKIN DISP REG TIP RULER CAP GENTIAN VIOL

## (undated) DEVICE — KIT T.JOHN TAKE HOME SOUTH SUB

## (undated) DEVICE — TOWEL SURG 26X15IN BLUE TIBURON NABSB DRAPE ADH STRIP STRL

## (undated) DEVICE — SPONGE OPHTHALMIC SHARP TIP HNDL MICROSPONGE 1-SPEAR DARK

## (undated) DEVICE — NEEDLE HPO 25GA 1.5IN THNWL REG BVL LL PIVOT SHLD MECH

## (undated) DEVICE — Device: Brand: DEFENDO AIR/WATER/SUCTION AND BIOPSY VALVE

## (undated) DEVICE — SUTURE ACS 10-0 CU-5 6IN NYL 2 ARM MONO NABSB STRL LF 8065192001

## (undated) DEVICE — GLOVE SURG 8 PROTEXIS PI MIC PWDR FREE SMTH BEAD CUFF

## (undated) DEVICE — GOWN SURG XL L3 NONREINFORCE SET IN SLV STRL LF DISP BLUE

## (undated) DEVICE — NEEDLE HPO 30GA .5IN REG WALL REG BVL LL HUB DEHP-FR STRL LF

## (undated) DEVICE — GLOVE SURG 8 PROTEXIS PI LF CRM PF BEAD CUFF STRL PLISPRN

## (undated) DEVICE — SYRINGE 3 ML GRAD NONPYROGENIC DEHP FREE PVC FREE LOK MED

## (undated) DEVICE — PUNCH SURG 8.25MM NONVACUUM 4 GUIDE POST BRRN DONOR CORNEA

## (undated) DEVICE — ALCON VISION STANDARD EYE PACK

## (undated) DEVICE — PAD EYE 2 58INX1 58IN OVAL CURITY CTN ABS NONWOVEN LF STRL

## (undated) DEVICE — SHIELD OPHTHALMIC VSTC ABS PAD EZ TO REM LIGHT STRL LF POR 8

## (undated) DEVICE — CABLE BP 12FT FTSWTCH STRL LF DISP

## (undated) DEVICE — BLADE SURG 360D TRPHN RADL VAC 8MM STRL DISP BRRN SS RCPT

## (undated) DEVICE — NEEDLE BLUNT 18GA 1.5IN REG WALL FILL LL HUB DEHP-FR STRL LF

## (undated) DEVICE — KNIFE OPHTHALMIC W1.52 MM 15 D STD BLADE FULL HNDL ALCON

## (undated) DEVICE — WATER STRL 1000ML PVC FREE DEHP-FR PIC LF

## (undated) DEVICE — SNARE 9MM 230CM 2.4MM EXACTO

## (undated) DEVICE — SYRINGE 3ML SCL MARK INDIV WRAP STRL MED LF LL

## (undated) DEVICE — SLEEVE CMPR MED KN LGTH ADJ CRCMF BLDR NYL KENDALL SCD EXP

## (undated) DEVICE — PACK OPHTHALMIC EYE

## (undated) DEVICE — PACK BASIC CATARACT

## (undated) DEVICE — SYRINGE 3ML GRAD N-PYRG DEHP-FR PVC FREE STRL MED LF DISP LL

## (undated) DEVICE — GLOVE SURG 6.5 PROTEXIS PI MIC LF CRM PF SMTH BEAD CUFF STRL

## (undated) DEVICE — SHIELD OPHTHALMIC LINT FREE STRL LF MEROCEL DISP 8MM CRNL

## (undated) DEVICE — DEVICE VISCOELASTIC RTN DISPERSIVE OD27 GA VISCOAT 4%

## (undated) DEVICE — PROBE HMERSR 150D TPR 25GA BP HLW CORE TRGT COAG FINE TIP

## (undated) DEVICE — GLOVE SURG 7 PROTEXIS PI LF CRM PF BEAD CUFF STRL PLISPRN

## (undated) DEVICE — SNARE OPTMZ PLPCTM TRP

## (undated) DEVICE — SUTURE NYL ACS 10-0 CU-5 L6 IN 2 ARM MONO NABSB

## (undated) DEVICE — GLOVE SURG 8 PROTEXIS PI MIC LF CRM PF SMTH BEAD CUFF STRL

## (undated) DEVICE — PROBE HMST ERASER 150 D TPR OD25 GA BP HLW CORE TARGET COAG

## (undated) NOTE — LETTER
03/29/21        [address] [facility] [address]      Dear Regi Randall,    [address] [facility] records indicate that you have outstanding lab work and or testing that was ordered for you and has not yet been completed:  Orders Placed This [facility] [address]

## (undated) NOTE — LETTER
10/02/21        [address] [place] [address]      Dear Carolyn Saeed records indicate that you have outstanding lab work and or testing that was ordered for you and has not yet been completed:  Orders Placed This Encounter      PSA Sc

## (undated) NOTE — LETTER
12/2/2017              Rachel Carl        [address] [facility]         Dear Ovi Amira Espinoza,    Your recent colonoscopy exam at [facility] on 11/25/2017 showed a single small colon polyp which I removed.  The polyp was an \"

## (undated) NOTE — MR AVS SNAPSHOT
[address], [address]  [address] [facility]  [phone]               Thank you for choosing us for your health care visit with Jen Parnell MD.  We are glad to serve you and happy to provide you with this s Zoster Vaccine Live 07842 UNT/0.65ML Solr   As directed for 1 dose.    Commonly known as:  ZOSTAVAX                Where to Get Your Medications      These medications were sent to [facility]ARM[facility]Y #[id number] - [place], [place] - [place] [address]

## (undated) NOTE — LETTER
12/30/20        [address]  [address] [facility] [address]      Dear [place],    [address] [facility] records indicate that you have outstanding lab work and or testing that was ordered for you and has not yet been completed:  Orders Placed This [place] [address]

## (undated) NOTE — LETTER
9/26/2022    [facility] [address] [address]            Dear Walker Lama,      Our records indicate that you are due for an appointment for a Colonoscopy with Cresencio Crawley MD. Our doctors are booking out about 3-5 months for procedures. Please call our office to schedule this appointment. Your medical well-being is important to us. If your insurance requires a referral, please call your primary care office to request one.       Thank you,      The Physicians and Staff at [facility]

## (undated) NOTE — Clinical Note
Pt is coming for hospital follow up on 6/24/19. Spouse stated the pt is doing better. The cough and breathing issues are better. Spouse stated the pt does get SOB at times with walking still but that it does seem better than before and resolves quickly.  Me

## (undated) NOTE — LETTER
06/23/21        [place] [address]      Dear Marilu Meza,    [address] [facility] records indicate that you have outstanding lab work and or testing that was ordered for you and has not yet been completed:  Orders Placed This [facility] [address]